# Patient Record
Sex: FEMALE | Race: WHITE | HISPANIC OR LATINO | Employment: UNEMPLOYED | URBAN - METROPOLITAN AREA
[De-identification: names, ages, dates, MRNs, and addresses within clinical notes are randomized per-mention and may not be internally consistent; named-entity substitution may affect disease eponyms.]

---

## 2017-01-06 DIAGNOSIS — E78.5 HYPERLIPIDEMIA: ICD-10-CM

## 2017-01-06 RX ORDER — ATORVASTATIN CALCIUM 20 MG/1
TABLET, FILM COATED ORAL
Qty: 180 TABLET | Refills: 0 | OUTPATIENT
Start: 2017-01-06

## 2017-01-09 DIAGNOSIS — E78.5 HYPERLIPIDEMIA, UNSPECIFIED HYPERLIPIDEMIA TYPE: ICD-10-CM

## 2017-01-09 RX ORDER — ATORVASTATIN CALCIUM 20 MG/1
40 TABLET, FILM COATED ORAL DAILY
Qty: 180 TABLET | Refills: 1 | Status: SHIPPED | OUTPATIENT
Start: 2017-01-09 | End: 2018-05-22 | Stop reason: SDUPTHER

## 2017-05-03 DIAGNOSIS — E05.90 PRIMARY HYPERTHYROIDISM: ICD-10-CM

## 2017-05-03 RX ORDER — METHIMAZOLE 5 MG/1
TABLET ORAL
Qty: 270 TABLET | Refills: 1 | Status: CANCELLED | OUTPATIENT
Start: 2017-05-03

## 2017-05-11 DIAGNOSIS — E05.90 PRIMARY HYPERTHYROIDISM: ICD-10-CM

## 2017-05-11 DIAGNOSIS — E78.5 HYPERLIPIDEMIA, UNSPECIFIED HYPERLIPIDEMIA TYPE: ICD-10-CM

## 2018-01-29 RX ORDER — HYDROCHLOROTHIAZIDE 25 MG/1
TABLET ORAL
Qty: 180 TABLET | Refills: 0 | Status: SHIPPED | OUTPATIENT
Start: 2018-01-29 | End: 2021-05-04

## 2018-05-01 ENCOUNTER — OFFICE VISIT (OUTPATIENT)
Dept: INTERNAL MEDICINE | Facility: CLINIC | Age: 63
End: 2018-05-01
Payer: MEDICAID

## 2018-05-01 ENCOUNTER — HOSPITAL ENCOUNTER (OUTPATIENT)
Dept: RADIOLOGY | Facility: HOSPITAL | Age: 63
Discharge: HOME | End: 2018-05-01
Attending: INTERNAL MEDICINE
Payer: MEDICAID

## 2018-05-01 VITALS
WEIGHT: 197.13 LBS | DIASTOLIC BLOOD PRESSURE: 90 MMHG | SYSTOLIC BLOOD PRESSURE: 130 MMHG | BODY MASS INDEX: 31.81 KG/M2 | TEMPERATURE: 98 F | HEART RATE: 68 BPM

## 2018-05-01 DIAGNOSIS — M54.50 LOW BACK PAIN, NON-SPECIFIC: ICD-10-CM

## 2018-05-01 DIAGNOSIS — Z00.00 ROUTINE GENERAL MEDICAL EXAMINATION AT A HEALTH CARE FACILITY: Primary | ICD-10-CM

## 2018-05-01 DIAGNOSIS — E05.90 HYPERTHYROIDISM: ICD-10-CM

## 2018-05-01 DIAGNOSIS — E55.9 VITAMIN D DEFICIENCY: ICD-10-CM

## 2018-05-01 DIAGNOSIS — E05.00 GRAVES DISEASE: ICD-10-CM

## 2018-05-01 DIAGNOSIS — M51.37 DEGENERATION OF LUMBAR OR LUMBOSACRAL INTERVERTEBRAL DISC: ICD-10-CM

## 2018-05-01 DIAGNOSIS — I87.2 VENOUS INSUFFICIENCY OF BOTH LOWER EXTREMITIES: Primary | ICD-10-CM

## 2018-05-01 DIAGNOSIS — I10 ESSENTIAL HYPERTENSION: ICD-10-CM

## 2018-05-01 DIAGNOSIS — E04.2 MULTIPLE THYROID NODULES: ICD-10-CM

## 2018-05-01 PROCEDURE — 99213 OFFICE O/P EST LOW 20 MIN: CPT | Mod: PBBFAC,25 | Performed by: INTERNAL MEDICINE

## 2018-05-01 PROCEDURE — 72110 X-RAY EXAM L-2 SPINE 4/>VWS: CPT | Mod: TC

## 2018-05-01 PROCEDURE — 99999 PR PBB SHADOW E&M-EST. PATIENT-LVL III: CPT | Mod: PBBFAC,,, | Performed by: INTERNAL MEDICINE

## 2018-05-01 PROCEDURE — 72110 X-RAY EXAM L-2 SPINE 4/>VWS: CPT | Mod: 26,,, | Performed by: RADIOLOGY

## 2018-05-01 PROCEDURE — 99396 PREV VISIT EST AGE 40-64: CPT | Mod: S$PBB,,, | Performed by: INTERNAL MEDICINE

## 2018-05-01 RX ORDER — GABAPENTIN 100 MG/1
100 CAPSULE ORAL NIGHTLY
Qty: 30 CAPSULE | Refills: 2 | Status: SHIPPED | OUTPATIENT
Start: 2018-05-01 | End: 2019-09-04 | Stop reason: SDUPTHER

## 2018-05-02 ENCOUNTER — HOSPITAL ENCOUNTER (OUTPATIENT)
Dept: RADIOLOGY | Facility: HOSPITAL | Age: 63
Discharge: HOME | End: 2018-05-02
Attending: INTERNAL MEDICINE
Payer: MEDICAID

## 2018-05-02 DIAGNOSIS — E05.90 HYPERTHYROIDISM: ICD-10-CM

## 2018-05-02 DIAGNOSIS — E04.2 MULTIPLE THYROID NODULES: ICD-10-CM

## 2018-05-02 PROCEDURE — 76536 US EXAM OF HEAD AND NECK: CPT | Mod: 26,,, | Performed by: RADIOLOGY

## 2018-05-02 PROCEDURE — 76536 US EXAM OF HEAD AND NECK: CPT | Mod: TC

## 2018-05-09 ENCOUNTER — HOSPITAL ENCOUNTER (OUTPATIENT)
Dept: VASCULAR SURGERY | Facility: CLINIC | Age: 63
Discharge: HOME | End: 2018-05-09
Attending: SURGERY
Payer: MEDICAID

## 2018-05-09 ENCOUNTER — INITIAL CONSULT (OUTPATIENT)
Dept: VASCULAR SURGERY | Facility: CLINIC | Age: 63
End: 2018-05-09
Payer: MEDICAID

## 2018-05-09 ENCOUNTER — TELEPHONE (OUTPATIENT)
Dept: INTERNAL MEDICINE | Facility: CLINIC | Age: 63
End: 2018-05-09

## 2018-05-09 VITALS
BODY MASS INDEX: 32.69 KG/M2 | HEART RATE: 65 BPM | SYSTOLIC BLOOD PRESSURE: 128 MMHG | DIASTOLIC BLOOD PRESSURE: 82 MMHG | WEIGHT: 196.19 LBS | TEMPERATURE: 98 F | HEIGHT: 65 IN

## 2018-05-09 DIAGNOSIS — I87.2 VENOUS INSUFFICIENCY OF BOTH LOWER EXTREMITIES: ICD-10-CM

## 2018-05-09 DIAGNOSIS — I87.2 VENOUS INSUFFICIENCY OF BOTH LOWER EXTREMITIES: Primary | ICD-10-CM

## 2018-05-09 DIAGNOSIS — Z13.820 OSTEOPOROSIS SCREENING: Primary | ICD-10-CM

## 2018-05-09 PROCEDURE — 93970 EXTREMITY STUDY: CPT | Mod: PBBFAC | Performed by: SURGERY

## 2018-05-09 PROCEDURE — 99213 OFFICE O/P EST LOW 20 MIN: CPT | Mod: PBBFAC | Performed by: SURGERY

## 2018-05-09 PROCEDURE — 99204 OFFICE O/P NEW MOD 45 MIN: CPT | Mod: S$PBB,,, | Performed by: SURGERY

## 2018-05-09 PROCEDURE — 93970 EXTREMITY STUDY: CPT | Mod: 26,S$PBB,, | Performed by: SURGERY

## 2018-05-09 PROCEDURE — 99999 PR PBB SHADOW E&M-EST. PATIENT-LVL III: CPT | Mod: PBBFAC,,, | Performed by: SURGERY

## 2018-05-09 NOTE — PROGRESS NOTES
"Priscilla Lim  05/09/2018    HPI:  Patient is a 63 y.o. female with a h/o hyperthyroidism on methimazole, HTN, HLD who is here today for evaluation of venous insufficiency.  She complains of R thigh more than leg pain and "sensation of inflammation" worse at the end of the day. The pain started 2-3 years ago and comes and goes, but is worst after a long day of being on her feet. The pain does not exacerbate with ambulation.  No edema; no claudication symptoms    No MI/stroke  Tobacco use: started smoking a few cigarettes/day a year ago but quit 3 months ago.    Works around her house    Past Medical History:   Diagnosis Date    Grave's disease     thyroid nodule    Hypertension     Mixed hyperlipidemia 8/23/2013     No past surgical history on file.  Family History   Problem Relation Age of Onset    Breast cancer Neg Hx     Colon cancer Neg Hx     Ovarian cancer Neg Hx     Cancer Neg Hx     Heart disease Mother     Hypertension Sister     Diabetes Sister     Hypertension Brother     No Known Problems Daughter     No Known Problems Son     No Known Problems Son     Hypertension Sister     Hypertension Sister     Diabetes Sister     Hypertension Sister     Diabetes Sister     Hypertension Sister     Diabetes Sister     Hypertension Brother     Hypertension Brother     Diabetes Brother     Hypertension Brother     Hypertension Brother     Hypertension Brother      Social History     Social History    Marital status:      Spouse name: N/A    Number of children: N/A    Years of education: N/A     Occupational History    Not on file.     Social History Main Topics    Smoking status: Former Smoker     Packs/day: 0.10     Years: 2.00     Quit date: 7/17/2010    Smokeless tobacco: Former User      Comment: ex-social smoker    Alcohol use Yes      Comment: socially    Drug use: No    Sexual activity: Yes     Partners: Male     Birth control/ protection: Post-menopausal     Other " Topics Concern    Not on file     Social History Narrative    No narrative on file       Current Outpatient Prescriptions:     atorvastatin (LIPITOR) 20 MG tablet, Take 2 tablets (40 mg total) by mouth once daily., Disp: 180 tablet, Rfl: 1    gabapentin (NEURONTIN) 100 MG capsule, Take 1 capsule (100 mg total) by mouth every evening., Disp: 30 capsule, Rfl: 2    hydrochlorothiazide (HYDRODIURIL) 25 MG tablet, Take 2 tablets (50 mg total) by mouth once daily., Disp: 180 tablet, Rfl: 1    hydroCHLOROthiazide (HYDRODIURIL) 25 MG tablet, TAKE TWO TABLETS BY MOUTH EVERY DAY, Disp: 180 tablet, Rfl: 0    methimazole (TAPAZOLE) 5 MG Tab, Take 1 tablet (5 mg total) by mouth 3 (three) times daily., Disp: 270 tablet, Rfl: 2    REVIEW OF SYSTEMS:  General: negative; ENT: negative; Allergy and Immunology: negative; Hematological and Lymphatic: Negative; Endocrine: negative; Respiratory: no cough, shortness of breath, or wheezing; Cardiovascular: no chest pain or dyspnea on exertion; Gastrointestinal: no abdominal pain/back, change in bowel habits, or bloody stools; Genito-Urinary: no dysuria, trouble voiding, or hematuria; Musculoskeletal: negative  Neurological: no TIA or stroke symptoms; Psychiatric: no nervousness, anxiety or depression.    PHYSICAL EXAM:   Right Arm BP - Sittin/82 (18 0951)  Left Arm BP - Sittin/86 (18 0951)  Pulse: 65  Temp: 98.2 °F (36.8 °C)      General appearance:  Alert, well-appearing, and in no distress.  Oriented to person, place, and time   Neurological: Normal speech, no focal findings noted; CN II - XII grossly intact           Musculoskeletal: Digits/nail without cyanosis/clubbing.  Normal muscle strength/tone.                 Neck: Supple, no significant adenopathy; thyroid is not enlarged                  No carotid bruit can be auscultated                Chest:  Clear to auscultation, no wheezes, rales or rhonchi, symmetric air entry     No use of accessory  muscles             Cardiac: Normal rate and regular rhythm, S1 and S2 normal; PMI non-displaced          Abdomen: Soft, nontender, nondistended, no masses or organomegaly     No rebound tenderness noted; bowel sounds normal     Pulsatile aortic mass is non palpable.     No groin adenopathy      Extremities:   Difficult to palpate femoral pulses bilaterally     2+pedal pulses palpable.     Minimal pre-tibial edema     No ulcerations    LAB RESULTS:  Lab Results   Component Value Date    K 4.0 05/01/2018    K 3.2 (L) 07/07/2015    K 3.5 08/19/2013    CREATININE 0.6 05/01/2018    CREATININE 0.7 07/07/2015    CREATININE 0.7 08/19/2013     Lab Results   Component Value Date    WBC 4.52 05/01/2018    WBC 4.82 07/07/2015    WBC 4.82 08/19/2013    HCT 40.5 05/01/2018    HCT 39.6 07/07/2015    HCT 41.1 08/19/2013     05/01/2018     07/07/2015     08/19/2013     Lab Results   Component Value Date    HGBA1C 5.6 05/01/2018    HGBA1C 5.9 07/07/2015    HGBA1C 6.0 08/19/2013     IMAGING:  EVLT u/s:  R GSV 5.5 mm  L GSV no reflux, No DVT    IMP/PLAN:  63 y.o. female with h/o hyperthyroidism on methimazole, HTN, HLD with R thigh discomfort -- burning and bilateral legs 'inflamed' / pain per pt  No lower leg edema; has bilateral lower leg pain  Does have R GSV  Reflux; may only explain part of R thigh discomfort  Do not think most of her symptoms are coming from the R reflux  Can do trial of Rx thigh-high 30-40 mm Hg compression to see whether R thigh discomfort improves and fu in 3-6 months   Also - think by her exam (has reticular veins) she may have L-sided GSV reflux; check [place] vas lab    Chintan Anderson MD FACS  Vascular/Endovascular Surgery

## 2018-05-09 NOTE — PROGRESS NOTES
Subjective:       Patient ID: Priscilla Lim is a 63 y.o. female.    Chief Complaint: Annual Exam    HPIPleasant lady from [place] here for her annual exam. Overall doing well,  But has noted  A burning sensation in R thigh especially when supine. She has pain in her L buttock most of the time - tolerable, but there. She takes ibuprofen occasionally with relief. I will obtain L spine XR for evaluation as well as referral to Vascular Medicine as she has evidence of varicose veins.  She has a history of Grave's Disease and is on Tapazole. She has been on this medication for several years and has not caused her any issues. Last visits blood work showed positive TSI IgG indicating activity in that regard. I will repeat this level for comparison. She also has hx of MNG and will obtain thyroid US for comparison.  Review of Systems   Constitutional: Negative for activity change, appetite change, fatigue and unexpected weight change.   Eyes: Negative for visual disturbance.   Respiratory: Negative for cough.    Cardiovascular: Negative for chest pain and leg swelling.   Gastrointestinal: Negative for abdominal distention, abdominal pain and blood in stool.   Genitourinary: Negative for difficulty urinating.   Musculoskeletal: Positive for arthralgias and joint swelling.   Skin: Negative.    Neurological: Negative for dizziness, weakness, light-headedness, numbness and headaches.       Objective:      Physical Exam   Constitutional: She is oriented to person, place, and time. She appears well-developed and well-nourished. No distress.   HENT:   Head: Normocephalic and atraumatic.   Right Ear: External ear normal.   Left Ear: External ear normal.   Mouth/Throat: Oropharynx is clear and moist. No oropharyngeal exudate.   Eyes: Conjunctivae and EOM are normal. Pupils are equal, round, and reactive to light. Right eye exhibits no discharge. Left eye exhibits no discharge. No scleral icterus.   Mild exophthalmos OS   Neck:  Normal range of motion. Neck supple. No JVD present. No thyromegaly present.   Cardiovascular: Normal rate, regular rhythm, normal heart sounds and intact distal pulses.    No murmur heard.  Varicose veins bilaterally.   Pulmonary/Chest: Effort normal and breath sounds normal. No respiratory distress. She has no wheezes. She exhibits no tenderness.   Abdominal: Soft. Bowel sounds are normal. She exhibits no distension and no mass. There is no tenderness.   Musculoskeletal: Normal range of motion. She exhibits tenderness. She exhibits no edema or deformity.   Lymphadenopathy:     She has no cervical adenopathy.   Neurological: She is alert and oriented to person, place, and time. No cranial nerve deficit. Coordination normal.   Skin: Skin is warm and dry. No rash noted. No erythema.   Dark pigmented nail L Great Toe.   Psychiatric: She has a normal mood and affect. Her behavior is normal. Judgment and thought content normal.   Nursing note and vitals reviewed.      Assessment:       1. Routine general medical examination at a health care facility    2. Essential hypertension    3. Hyperthyroidism    4. Multiple thyroid nodules    5. Vitamin D deficiency    6. Low back pain, non-specific    7. Degeneration of lumbar or lumbosacral intervertebral disc      8.    Grave's Disease.    9.    Varicose veins LE.   10.   Pigmented toe nail.  Plan:    1. Obtain blood work.         2. Obtain thyroid US.         3. Obtain L spine XR.         4. Refer to Vascular Medicine.         5. Refer to Dermatology.         6. Continue with current medications.         7. RTC for review.

## 2018-05-09 NOTE — LETTER
May 9, 2018      Yandel Batista MD  [address]  [facility] [address]           [facility] - [facility]  [address]  [facility] [address]  Phone: [phone]  Fax: [phone]          Patient: Priscilla Lim   MR Number: [id number]   YOB: 1955   Date of Visit: 5/9/2018       Dear Dr. Yandel Batista:    Thank you for referring Priscilla Lim to me for evaluation. Attached you will find relevant portions of my assessment and plan of care.    If you have questions, please do not hesitate to call me. I look forward to following Priscilla Lim along with you.    Sincerely,    Chintan Anderson MD    Enclosure  CC:  No Recipients    If you would like to receive this communication electronically, please contact [email or web address] or [phone] to request more information on [employer] Envelope Link access.    For providers and/or their staff who would like to refer a patient to [facility], please contact us through our one-stop-shop provider referral line, [facility], at [phone].    If you feel you have received this communication in error or would no longer like to receive these types of communications, please e-mail [email or web address]

## 2018-05-15 ENCOUNTER — HOSPITAL ENCOUNTER (OUTPATIENT)
Dept: RADIOLOGY | Facility: CLINIC | Age: 63
Discharge: HOME | End: 2018-05-15
Attending: INTERNAL MEDICINE
Payer: MEDICAID

## 2018-05-15 DIAGNOSIS — Z13.820 OSTEOPOROSIS SCREENING: ICD-10-CM

## 2018-05-15 PROCEDURE — 77080 DXA BONE DENSITY AXIAL: CPT | Mod: TC

## 2018-05-15 PROCEDURE — 77080 DXA BONE DENSITY AXIAL: CPT | Mod: 26,,, | Performed by: INTERNAL MEDICINE

## 2018-05-22 ENCOUNTER — OFFICE VISIT (OUTPATIENT)
Dept: INTERNAL MEDICINE | Facility: CLINIC | Age: 63
End: 2018-05-22
Payer: MEDICAID

## 2018-05-22 DIAGNOSIS — M51.37 DEGENERATION OF LUMBAR OR LUMBOSACRAL INTERVERTEBRAL DISC: ICD-10-CM

## 2018-05-22 DIAGNOSIS — E04.1 LEFT THYROID NODULE: ICD-10-CM

## 2018-05-22 DIAGNOSIS — E78.5 HYPERLIPIDEMIA, UNSPECIFIED HYPERLIPIDEMIA TYPE: ICD-10-CM

## 2018-05-22 DIAGNOSIS — E05.90 PRIMARY HYPERTHYROIDISM: ICD-10-CM

## 2018-05-22 DIAGNOSIS — Z71.89 ENCOUNTER FOR MEDICATION REVIEW AND COUNSELING: Primary | ICD-10-CM

## 2018-05-22 PROCEDURE — 99212 OFFICE O/P EST SF 10 MIN: CPT | Mod: PBBFAC | Performed by: INTERNAL MEDICINE

## 2018-05-22 PROCEDURE — 99999 PR PBB SHADOW E&M-EST. PATIENT-LVL II: CPT | Mod: PBBFAC,,, | Performed by: INTERNAL MEDICINE

## 2018-05-22 PROCEDURE — 99213 OFFICE O/P EST LOW 20 MIN: CPT | Mod: S$PBB,,, | Performed by: INTERNAL MEDICINE

## 2018-05-22 RX ORDER — TRAMADOL HYDROCHLORIDE 50 MG/1
50 TABLET ORAL EVERY 12 HOURS PRN
Qty: 60 TABLET | Refills: 1 | Status: SHIPPED | OUTPATIENT
Start: 2018-05-22 | End: 2021-05-04

## 2018-05-22 RX ORDER — ATORVASTATIN CALCIUM 20 MG/1
TABLET, FILM COATED ORAL
Qty: 90 TABLET | Refills: 2 | Status: SHIPPED | OUTPATIENT
Start: 2018-05-22 | End: 2020-10-08 | Stop reason: CLARIF

## 2018-05-22 RX ORDER — METHIMAZOLE 5 MG/1
5 TABLET ORAL DAILY
Qty: 90 TABLET | Refills: 3 | Status: SHIPPED | OUTPATIENT
Start: 2018-05-22 | End: 2018-08-21

## 2018-05-22 NOTE — PROGRESS NOTES
Mrs Lim is here today for her review. I gave her copies of her studies and discussed them in detail including blood work that showed elevated lipids. She has stopped taking her Lipitor and I recommended she restart it. I gave her rx for Lipitor 20 mgs daily. I will repeat lipid profile/ALT in 2-3 months. TSI had decreased to 0.34 (down from 5.7 several years ago). She will continue with Tapazole 5 mgs daily as before; rx provided. All other parameters were unremarkable. Additional studies included BMD that showed osteopenia of L spine; will continue with calcium/vitamin D daily. Repeat BMD in 2 years. I provided the thyroid US report which showed a mixed cystic/solid nodule L lobe measuring 1.7x10.8x0.8 cm - TIRADS 5. I will obtain FNA of nodule as recommended and proceed from there. L spine XR showed mild DJD as well as Grade I anterolisthesis of L4 on L5. She c/o pain in her lower back and buttock that sometimes can be severe. She has been seen in Pain Management in the past and underwent WARREN with minimal relief. She takes Ibuprofen 800 mgs tabs prn. I gave her rx for Tramadol 50 m gs with instructions for severe pain. Risks/benfits were discussed. Core strengthening exercises were also discussed. She will continue with her other medications and RTC 3 months or sooner if needed.

## 2018-06-20 ENCOUNTER — TELEPHONE (OUTPATIENT)
Dept: INTERNAL MEDICINE | Facility: CLINIC | Age: 63
End: 2018-06-20

## 2018-06-20 DIAGNOSIS — E04.2 MULTIPLE THYROID NODULES: Primary | ICD-10-CM

## 2018-06-21 ENCOUNTER — HOSPITAL ENCOUNTER (OUTPATIENT)
Dept: RADIOLOGY | Facility: HOSPITAL | Age: 63
Discharge: HOME | End: 2018-06-21
Attending: INTERNAL MEDICINE
Payer: MEDICAID

## 2018-06-21 DIAGNOSIS — E04.2 MULTIPLE THYROID NODULES: ICD-10-CM

## 2018-06-21 PROCEDURE — 88173 CYTOPATH EVAL FNA REPORT: CPT | Performed by: PATHOLOGY

## 2018-06-21 PROCEDURE — 10022 US FINE NEEDLE ASPIRATION WITH IMAGING: CPT | Mod: ,,, | Performed by: RADIOLOGY

## 2018-06-21 PROCEDURE — 88173 CYTOPATH EVAL FNA REPORT: CPT | Mod: 26,,, | Performed by: PATHOLOGY

## 2018-06-21 PROCEDURE — 76942 ECHO GUIDE FOR BIOPSY: CPT | Mod: 26,,, | Performed by: RADIOLOGY

## 2018-06-21 PROCEDURE — 10022 US FINE NEEDLE ASPIRATION WITH IMAGING: CPT

## 2018-06-21 NOTE — H&P
Radiology History & Physical      SUBJECTIVE:     Chief Complaint: left thyroid nodule    History of Present Illness:  Priscilla Lim is a 63 y.o. female who presents for left thyroid nodule fna  Past Medical History:   Diagnosis Date    Grave's disease     thyroid nodule    Hypertension     Mixed hyperlipidemia 8/23/2013     No past surgical history on file.    Home Meds:   Prior to Admission medications    Medication Sig Start Date End Date Taking? Authorizing Provider   atorvastatin (LIPITOR) 20 MG tablet Ata 1 tableta por jorge. 5/22/18   Yandel Batista MD   gabapentin (NEURONTIN) 100 MG capsule Take 1 capsule (100 mg total) by mouth every evening. 5/1/18 5/1/19  Yandel Batista MD   hydrochlorothiazide (HYDRODIURIL) 25 MG tablet Take 2 tablets (50 mg total) by mouth once daily. 12/28/16   Yandel Batista MD   hydroCHLOROthiazide (HYDRODIURIL) 25 MG tablet TAKE TWO TABLETS BY MOUTH EVERY DAY 1/29/18   Yandel Batista MD   methimazole (TAPAZOLE) 5 MG Tab Take 1 tablet (5 mg total) by mouth 3 (three) times daily. 7/17/15   Fina Duran MD   methIMAzole (TAPAZOLE) 5 MG Tab Take 1 tablet (5 mg total) by mouth once daily. 5/22/18 5/22/19  Yandel Batista MD   traMADol (ULTRAM) 50 mg tablet Take 1 tablet (50 mg total) by mouth every 12 (twelve) hours as needed for Pain. 5/22/18   Yandel Batista MD     Anticoagulants/Antiplatelets: no anticoagulation    Allergies: Review of patient's allergies indicates:  No Known Allergies  Sedation History:  no adverse reactions    Review of Systems:   As documented in primary provider H&P.      OBJECTIVE:     Vital Signs (Most Recent)       Physical Exam:  ASA: 3  Mallampati: 1      Laboratory  Lab Results   Component Value Date    INR 1.3 (H) 09/22/2010       Lab Results   Component Value Date    WBC 4.52 05/01/2018    HGB 13.0 05/01/2018    HCT 40.5 05/01/2018    MCV 86 05/01/2018     05/01/2018      Lab Results   Component  Value Date    GLU 85 05/01/2018     05/01/2018    K 4.0 05/01/2018     05/01/2018    CO2 27 05/01/2018    BUN 8 05/01/2018    CREATININE 0.6 05/01/2018    CALCIUM 9.3 05/01/2018    MG 1.9 09/22/2010    ALT 17 05/01/2018    AST 16 05/01/2018    ALBUMIN 3.7 05/01/2018    BILITOT 0.3 05/01/2018       ASSESSMENT/PLAN:     Sedation Plan: local only  Patient will undergo left thyroid nodule fna.

## 2018-06-21 NOTE — PROCEDURES
Radiology Post-Procedure Note    Pre Op Diagnosis: Thyroid nodule    Post Op Diagnosis: same    Procedure: Ultrasound guided thyroid biopsy    Procedure performed by: Nikki Ferrera MD    Written Informed Consent Obtained: Yes    Specimen Removed: YES     Estimated Blood Loss: Minimal    Findings: Local anesthesia was used.    Fine needle aspiration was performed for evaluation of a left sided thyroid nodule using ultrasound guidance. Specimen was sent to the laboratory for further analysis.    There were no complications and the patient tolerated the procedure well.  Please see Imaging report for further details.    Maury Urbina MD  Department of Radiology   PGY II  [phone]

## 2018-08-17 NOTE — PROGRESS NOTES
Subjective:      Patient ID: Priscilla Viera is a 63 y.o. female.    Chief Complaint:  Thyroid Problem      History of Present Illness  Priscilla Viera presents today for Evaluation & management of hyperthyroidism. This is her first visit with me. She was previously seen by Dr. Fitzgerald in 07/2015.    Hyperthyroidism from Graves' disease diagnosed in 2010 that is currently taking Methimazole 5 mg daily.  Occ she feels hot.   She is irritable, impatient and occ feels anxiety.   Has left eye exophthalmos and no pain. (saw optho in 2015)   + occassional headaches.   Denies blurry vision or double vision.   + weight gain- 2 lbs.   No constipation or diarrhea.   + Hair loss.   + Dry skin.   No tiredness.   No cp, palpations or sob     She had a thyroid nuc med scan in 2010:  IMPRESSION:   1.  THE FINDINGS ARE COMPATIBLE WITH GRAVES DISEASE.  2.  IF IODINE-131 TREATMENT IS CONSIDERED, THE DOSE WOULD BE 16 mCi   ORALLY OF IODINE-131.  3.  THE 24 HOURS IODINE UPTAKE IS INCREASED TO 70%.    She has exophthalmos of the left eye and Dr. Fitzgerald and her decided not to proceed with i131.   Results for PRISCLILA VIERA (MRN [id number]) as of 8/21/2018 09:24   Ref. Range 7/7/2015 07:40 5/1/2018 11:49   TSH Latest Ref Range: 0.400 - 4.000 uIU/mL 1.587 0.806   Free T4 Latest Ref Range: 0.71 - 1.51 ng/dL 1.00    Thyroid-Stim IG Quantitative Latest Ref Range: <0.10 IU/L  0.34 (H)     Also has a thyroid nodule in the left lobe that is being monitored.   Last thyroid u/s 5/2/2018:  FINDINGS:  Right lobe of the thyroid measures 5.1 x 1.6 x 1.6 cm.    Left lobe of the thyroid measures 5.3 x 1.8 x 1.9 cm.    Isthmus measures 0.3 cm in thickness.    Several tiny benign-appearing anechoic lesions identified within the right thyroid gland.  The left thyroid gland is heterogeneous.  There is a suspicious appearing left thyroid nodule measuring 1.7 x 10.8 x 0.8 cm which is mixed cystic and solid in appearance, hypoechoic, and irregular consistent  with a TIRADS 5 nodule and meets criteria for FNA.    No abnormal cervical lymph nodes are identified.      Impression       1.  TIRADS 5 left thyroid lobe nodule which meets criteria for FNA.     Had a FNA in radiology 6/21/2018:  FINAL PATHOLOGIC DIAGNOSIS  THYROID, LEFT, FINE-NEEDLE ASPIRATION:  Satisfactory for interpretation  [place] system thyroid cytology category: Benign  Benign follicular epithelial cells and colloid consistent with benign colloid nodule    No difficulty breathing or swallowing   No voice changes  No FH of thyroid cancer  No personal history of radiation treatment or exposure     She takes HCTZ 25 mg daily for HTN.     BMD done 5/2018:  Impression     Osteopenia of the lumbar spine.  FRAX calculations do not support treatment.    RECOMMENDATIONS of [facility]:    1.  Calcium 6330-2722 mg daily and vitamin D 800-1000 units daily, adequate exercise.    2.  Repeat BMD in 2 years     Review of Systems   Constitutional: Positive for fatigue. Negative for unexpected weight change.   Eyes: Negative for visual disturbance.   Respiratory: Negative for cough and shortness of breath.    Cardiovascular: Negative for chest pain.   Gastrointestinal: Negative for abdominal pain.   Endocrine: Negative for cold intolerance, heat intolerance, polydipsia, polyphagia and polyuria.   Musculoskeletal: Negative for arthralgias.   Skin: Negative for wound.   Neurological: Negative for headaches.   Hematological: Does not bruise/bleed easily.   Psychiatric/Behavioral: Negative for sleep disturbance.     Objective:   Physical Exam   Constitutional: She appears well-developed.   HENT:   Right Ear: External ear normal.   Left Ear: External ear normal.   Nose: Nose normal.   Hearing Normal     Eyes:   exophthalmos of the left eye   Neck: No tracheal deviation present. Thyromegaly present.   Cardiovascular: Normal rate.   No murmur heard.  No edema present   Pulmonary/Chest:  Effort normal and breath sounds normal.   Abdominal: Soft. She exhibits no mass. No hernia.   Neurological: She is alert. No cranial nerve deficit or sensory deficit.   Skin: No rash noted.   Psychiatric: She has a normal mood and affect. Judgment normal.   Vitals reviewed.    Body mass index is 36.13 kg/m².    Lab Review:   Lab Results   Component Value Date    HGBA1C 5.6 05/01/2018     Lab Results   Component Value Date    CHOL 236 (H) 05/01/2018    HDL 43 05/01/2018    LDLCALC 152.4 05/01/2018    TRIG 203 (H) 05/01/2018    CHOLHDL 18.2 (L) 05/01/2018     Lab Results   Component Value Date     05/01/2018    K 4.0 05/01/2018     05/01/2018    CO2 27 05/01/2018    GLU 85 05/01/2018    BUN 8 05/01/2018    CREATININE 0.6 05/01/2018    CALCIUM 9.3 05/01/2018    PROT 6.9 05/01/2018    ALBUMIN 3.7 05/01/2018    BILITOT 0.3 05/01/2018    ALKPHOS 74 05/01/2018    AST 16 05/01/2018    ALT 17 05/01/2018    ANIONGAP 9 05/01/2018    ESTGFRAFRICA >60.0 05/01/2018    EGFRNONAA >60.0 05/01/2018    TSH 0.806 05/01/2018       Assessment and Plan     1. Primary hyperthyroidism  TSH    methIMAzole (TAPAZOLE) 5 MG Tab    Ambulatory referral to Ophthalmology   2. Multiple thyroid nodules  US Soft Tissue Head Neck Thyroid   3. Exophthalmos  Ambulatory referral to Ophthalmology     -- not a candidate for i131 treatment due to eye manifestations.   -- schedule appointment with optho since last one was in 2015.   -- increase methimazole 7.5 mg daily (1.5 tablets) to assist with symptoms. Encouraged her to let me know if she develops a rash, fever, or sore throat while taking this medication.    -- recheck TFTs in 6 weeks  -- discussed thyroidectomy with patient and she is not interested at this time.    -- repeat thyroid US in 1 year. Order placed.     Follow-up in about 1 year (around 8/21/2019).

## 2018-08-21 ENCOUNTER — OFFICE VISIT (OUTPATIENT)
Dept: ENDOCRINOLOGY | Facility: CLINIC | Age: 63
End: 2018-08-21
Payer: MEDICAID

## 2018-08-21 VITALS
SYSTOLIC BLOOD PRESSURE: 118 MMHG | HEART RATE: 84 BPM | BODY MASS INDEX: 36.35 KG/M2 | HEIGHT: 62 IN | WEIGHT: 197.56 LBS | DIASTOLIC BLOOD PRESSURE: 82 MMHG

## 2018-08-21 DIAGNOSIS — H05.20 EXOPHTHALMOS: ICD-10-CM

## 2018-08-21 DIAGNOSIS — E04.2 MULTIPLE THYROID NODULES: ICD-10-CM

## 2018-08-21 DIAGNOSIS — E05.90 PRIMARY HYPERTHYROIDISM: Primary | ICD-10-CM

## 2018-08-21 PROCEDURE — 99999 PR PBB SHADOW E&M-EST. PATIENT-LVL IV: CPT | Mod: PBBFAC,,, | Performed by: NURSE PRACTITIONER

## 2018-08-21 PROCEDURE — 99214 OFFICE O/P EST MOD 30 MIN: CPT | Mod: PBBFAC | Performed by: NURSE PRACTITIONER

## 2018-08-21 PROCEDURE — 99204 OFFICE O/P NEW MOD 45 MIN: CPT | Mod: S$PBB,,, | Performed by: NURSE PRACTITIONER

## 2018-08-21 RX ORDER — METHIMAZOLE 5 MG/1
TABLET ORAL
Qty: 120 TABLET | Refills: 3 | Status: SHIPPED | OUTPATIENT
Start: 2018-08-21 | End: 2021-05-11

## 2018-08-21 NOTE — LETTER
August 21, 2018      Yandel Batista MD  [address]  [facility] [address]           [facility] - Endocrinology  [address]  [facility] [address]  Phone: [phone]          Patient: Priscilla Lim   MR Number: [id number]   YOB: 1955   Date of Visit: 8/21/2018       Dear Dr. Yandel Batista:    Thank you for referring Priscilla Lim to me for evaluation. Attached you will find relevant portions of my assessment and plan of care.    If you have questions, please do not hesitate to call me. I look forward to following Priscilla Lim along with you.    Sincerely,    Kesha Sung, ESTHER    Enclosure  CC:  No Recipients    If you would like to receive this communication electronically, please contact [email or web address] or [phone] to request more information on [employer] Link access.    For providers and/or their staff who would like to refer a patient to [facility], please contact us through our one-stop-shop provider referral line, [facility], at [phone].    If you feel you have received this communication in error or would no longer like to receive these types of communications, please e-mail [email or web address]

## 2018-09-13 ENCOUNTER — CLINICAL SUPPORT (OUTPATIENT)
Dept: OPHTHALMOLOGY | Facility: CLINIC | Age: 63
End: 2018-09-13
Payer: MEDICAID

## 2018-09-13 ENCOUNTER — INITIAL CONSULT (OUTPATIENT)
Dept: OPHTHALMOLOGY | Facility: CLINIC | Age: 63
End: 2018-09-13
Payer: MEDICAID

## 2018-09-13 DIAGNOSIS — E05.00 GRAVES' ORBITOPATHY: ICD-10-CM

## 2018-09-13 PROCEDURE — 92083 EXTENDED VISUAL FIELD XM: CPT | Mod: PBBFAC | Performed by: OPHTHALMOLOGY

## 2018-09-13 PROCEDURE — 99212 OFFICE O/P EST SF 10 MIN: CPT | Mod: PBBFAC | Performed by: OPHTHALMOLOGY

## 2018-09-13 PROCEDURE — 99999 PR PBB SHADOW E&M-EST. PATIENT-LVL II: CPT | Mod: PBBFAC,,, | Performed by: OPHTHALMOLOGY

## 2018-09-13 PROCEDURE — 92004 COMPRE OPH EXAM NEW PT 1/>: CPT | Mod: S$PBB,,, | Performed by: OPHTHALMOLOGY

## 2018-09-13 NOTE — PATIENT INSTRUCTIONS
Artificial tears to both eyes four times a day (Refresh or Systane).  Refresh PM ointment to both eyes at bedtime.  Repeat examination in six months.

## 2018-09-13 NOTE — LETTER
September 13, 2018      Yandel Batista MD  [address]  [facility] [address]           [facility] - Ophthalmology  [address]  [facility] [address]  Phone: [phone]  Fax: [phone]          Patient: Priscilla Lim   MR Number: [id number]   YOB: 1955   Date of Visit: 9/13/2018       Dear Kesha Sung:    Thank you for referring Priscilla Lim to me for evaluation. Attached you will find relevant portions of my assessment and plan of care.    If you have questions, please do not hesitate to call me. I look forward to following Priscilla Lim along with you.    Sincerely,    Carlos Horowitz MD    Enclosure  CC:  Kesha Sung, ESTHER    If you would like to receive this communication electronically, please contact [email or web address] or [phone] to request more information on [employer] Link access.    For providers and/or their staff who would like to refer a patient to [facility], please contact us through our one-stop-shop provider referral line, [facility], at [phone].    If you feel you have received this communication in error or would no longer like to receive these types of communications, please e-mail [email or web address]

## 2018-09-13 NOTE — PROGRESS NOTES
HPI     Concerns About Ocular Health      Additional comments: Graves'              Comments     Referred by Dr.Carly JAYME Sung,(Endo)  Dx w/Graves' disease since 2010.  [facility]  here w/patient.  Patient states OU vision has been stable, only using OTC readers.  occasional OS eye pain when stressed.  No eye pain today.    Review HVF    I have personally interviewed the patient, reviewed the history and   examined the patient and agree with the technician's exam.          Last edited by Carlos Horowitz MD on 9/13/2018  3:23 PM. (History)            Assessment /Plan     For exam results, see Encounter Report.    Graves' orbitopathy  -     Treadwell Visual Field - OU - Extended - Both Eyes      Artificial tears four times per day.  Lubricating ointment at bedtime.   Cold compresses as desired.  Repeat exam in six months.

## 2018-10-02 ENCOUNTER — LAB VISIT (OUTPATIENT)
Dept: LAB | Facility: HOSPITAL | Age: 63
End: 2018-10-02
Payer: MEDICAID

## 2018-10-02 ENCOUNTER — TELEPHONE (OUTPATIENT)
Dept: ENDOCRINOLOGY | Facility: CLINIC | Age: 63
End: 2018-10-02

## 2018-10-02 ENCOUNTER — TELEPHONE (OUTPATIENT)
Dept: INTERNAL MEDICINE | Facility: CLINIC | Age: 63
End: 2018-10-02

## 2018-10-02 DIAGNOSIS — E05.90 PRIMARY HYPERTHYROIDISM: ICD-10-CM

## 2018-10-02 DIAGNOSIS — Z12.31 ENCOUNTER FOR SCREENING MAMMOGRAM FOR BREAST CANCER: Primary | ICD-10-CM

## 2018-10-02 LAB — TSH SERPL DL<=0.005 MIU/L-ACNC: 1.07 UIU/ML

## 2018-10-02 PROCEDURE — 84443 ASSAY THYROID STIM HORMONE: CPT

## 2018-10-02 PROCEDURE — 36415 COLL VENOUS BLD VENIPUNCTURE: CPT

## 2018-10-02 NOTE — TELEPHONE ENCOUNTER
Attempted to call pt on  both contact no and phone is busy cant left voice mail is full cant left voice mail. Will try back.

## 2018-10-09 ENCOUNTER — OFFICE VISIT (OUTPATIENT)
Dept: OBSTETRICS AND GYNECOLOGY | Facility: CLINIC | Age: 63
End: 2018-10-09
Payer: MEDICAID

## 2018-10-09 ENCOUNTER — TELEPHONE (OUTPATIENT)
Dept: OBSTETRICS AND GYNECOLOGY | Facility: CLINIC | Age: 63
End: 2018-10-09

## 2018-10-09 VITALS
DIASTOLIC BLOOD PRESSURE: 70 MMHG | WEIGHT: 195 LBS | HEIGHT: 65 IN | BODY MASS INDEX: 32.49 KG/M2 | SYSTOLIC BLOOD PRESSURE: 120 MMHG

## 2018-10-09 DIAGNOSIS — Z12.39 BREAST CANCER SCREENING: ICD-10-CM

## 2018-10-09 DIAGNOSIS — Z78.0 POSTMENOPAUSE: ICD-10-CM

## 2018-10-09 DIAGNOSIS — N81.89 PELVIC RELAXATION: ICD-10-CM

## 2018-10-09 DIAGNOSIS — N39.41 URGE INCONTINENCE OF URINE: ICD-10-CM

## 2018-10-09 DIAGNOSIS — Z01.419 WELL WOMAN EXAM WITH ROUTINE GYNECOLOGICAL EXAM: Primary | ICD-10-CM

## 2018-10-09 PROCEDURE — G0101 CA SCREEN;PELVIC/BREAST EXAM: HCPCS | Mod: PBBFAC | Performed by: NURSE PRACTITIONER

## 2018-10-09 PROCEDURE — 99214 OFFICE O/P EST MOD 30 MIN: CPT | Mod: PBBFAC | Performed by: NURSE PRACTITIONER

## 2018-10-09 PROCEDURE — 99999 PR PBB SHADOW E&M-EST. PATIENT-LVL IV: CPT | Mod: PBBFAC,,, | Performed by: NURSE PRACTITIONER

## 2018-10-09 PROCEDURE — 88175 CYTOPATH C/V AUTO FLUID REDO: CPT

## 2018-10-09 PROCEDURE — 99386 PREV VISIT NEW AGE 40-64: CPT | Mod: S$PBB,,, | Performed by: NURSE PRACTITIONER

## 2018-10-09 RX ORDER — CLOTRIMAZOLE 1 %
CREAM (GRAM) TOPICAL
COMMUNITY
Start: 2018-10-03

## 2018-10-09 RX ORDER — TERBINAFINE HYDROCHLORIDE 250 MG/1
TABLET ORAL
COMMUNITY
Start: 2018-10-03 | End: 2019-09-04

## 2018-10-09 NOTE — PROGRESS NOTES
HISTORY OF PRESENT ILLNESS:    Priscilla Lim is a 63 y.o. female , presents for a routine exam and has no gyn complaints.    -c/o U.I, frequency, pad use, vulvar chafing from pad use w/o associated fever, dysuria, pelvic or back pain, vaginal discharge or bleeding.    Past Medical History:   Diagnosis Date    Grave's disease     thyroid nodule    Hypertension     Mixed hyperlipidemia 2013       Past Surgical History:   Procedure Laterality Date    BLOCK-NERVE-MEDIAL BRANCH-LUMBAR Right 2015    Performed by Tierra Tolliver MD at [facility] [place], [place]    BLOCK-NERVE-MEDIAL BRANCH-LUMBAR Left 2015    Performed by Ammon Sweet MD at [facility]        MEDICATIONS AND ALLERGIES:      Current Outpatient Medications:     atorvastatin (LIPITOR) 20 MG tablet, Ata 1 tableta por jorge., Disp: 90 tablet, Rfl: 2    clotrimazole (LOTRIMIN) 1 % cream, , Disp: , Rfl:     gabapentin (NEURONTIN) 100 MG capsule, Take 1 capsule (100 mg total) by mouth every evening., Disp: 30 capsule, Rfl: 2    hydroCHLOROthiazide (HYDRODIURIL) 25 MG tablet, TAKE TWO TABLETS BY MOUTH EVERY DAY, Disp: 180 tablet, Rfl: 0    methIMAzole (TAPAZOLE) 5 MG Tab, Take 1.5 tablets daily (7.5 mg), Disp: 120 tablet, Rfl: 3    MULTIVIT-IRON-MIN-FOLIC ACID 3,500-18-0.4 UNIT-MG-MG ORAL CHEW, Take by mouth., Disp: , Rfl:     terbinafine HCl (LAMISIL) 250 mg tablet, , Disp: , Rfl:     traMADol (ULTRAM) 50 mg tablet, Take 1 tablet (50 mg total) by mouth every 12 (twelve) hours as needed for Pain., Disp: 60 tablet, Rfl: 1    Review of patient's allergies indicates:  No Known Allergies    Family History   Problem Relation Age of Onset    Heart disease Mother     Hypertension Sister     Diabetes Sister     Hypertension Brother     No Known Problems Daughter     No Known Problems Son     No Known Problems Son     Hypertension Sister     Hypertension Sister     Diabetes Sister     Hypertension Sister     Diabetes Sister      Hypertension Sister     Diabetes Sister     Hypertension Brother     Hypertension Brother     Diabetes Brother     Hypertension Brother     Hypertension Brother     Hypertension Brother     Breast cancer Neg Hx     Colon cancer Neg Hx     Ovarian cancer Neg Hx        Social History     Socioeconomic History    Marital status:      Spouse name: Not on file    Number of children: Not on file    Years of education: Not on file    Highest education level: Not on file   Social Needs    Financial resource strain: Not on file    Food insecurity - worry: Not on file    Food insecurity - inability: Not on file    Transportation needs - medical: Not on file    Transportation needs - non-medical: Not on file   Occupational History    Not on file   Tobacco Use    Smoking status: Former Smoker     Packs/day: 0.10     Years: 2.00     Pack years: 0.20     Last attempt to quit: 2010     Years since quittin.2    Smokeless tobacco: Former User    Tobacco comment: ex-social smoker   Substance and Sexual Activity    Alcohol use: Yes     Comment: socially    Drug use: No    Sexual activity: Yes     Partners: Male     Birth control/protection: Post-menopausal   Other Topics Concern    Not on file   Social History Narrative    Not on file       OB HISTORY: Number of vaginal deliveries:3    COMPREHENSIVE GYN HISTORY:  PAP History:  Denies abnormal Paps. LAST PAP 13 NORMAL.  Infection History: Denies STDs. Denies PID.  Benign History: Denies uterine fibroids. Denies ovarian cysts. Denies endometriosis.  Denies other conditions.  Cancer History: Denies cervical cancer. Denies uterine cancer or hyperplasia. Denies ovarian cancer. Denies vulvar cancer or pre-cancer. Denies vaginal cancer or pre-cancer. Denies breast cancer. Denies colon cancer.  Sexual Activity History: Reports currently being sexually active  Menstrual History: Denies menses. Pt is  not on HRT.     ROS:  GENERAL: +  "INTENTIONAL WT LOSS. No fatigue. No swelling. No fever.  CARDIOVASCULAR: No chest pain. No shortness of breath. No leg cramps.   NEUROLOGICAL: No headaches. No vision changes.  MSK: + CHRONIC BACK PAIN.  BREASTS: No pain. No lumps. No discharge.  ABDOMEN: No pain. No nausea. No vomiting. No diarrhea. No constipation.  REPRODUCTIVE: No abnormal bleeding.   VULVA: No pain. No lesions. No itching.  VAGINA: No relaxation. No itching. No odor. No discharge. No lesions.  URINARY: + UI, PAD USE, NOCTURIA, FREQUENCY. No dysuria.    /70   Ht 5' 5" (1.651 m)   Wt 88.5 kg (195 lb)   LMP  (LMP Unknown)   BMI 32.45 kg/m²   7-16-15  lb 6.4 oz    PE:  APPEARANCE: Well nourished, well developed, in no acute distress.  AFFECT: WNL, alert and oriented x 3.  SKIN: No hirsutism or acne.  NECK: Neck symmetric without masses or thyromegaly.  NODES: No inguinal, cervical, axillary or femoral lymph node enlargement.  CHEST: Good respiratory effort.   ABDOMEN: Soft. No tenderness or masses. OBESE. No CVA tenderness.  BREASTS: Symmetrical, no skin changes or visible lesions. No palpable masses, nipple discharge bilaterally.  PELVIC: ATROPHIC EXTERNAL FEMALE GENITALIA without lesions. Normal hair distribution. Adequate perineal body, normal urethral meatus. VAGINA DRY without lesions or discharge. CERVIX STENOTIC without lesions, discharge or tenderness. GRADE 1 CYSTOCELE present. Bimanual exam shows uterus to be normal size, regular, mobile and nontender. Adnexa without masses or tenderness. EXAM DIFFICULT DUE TO BODY HABITUS.  RECTAL: Rectovaginal exam confirms above with normal sphincter tone, no masses.  EXTREMITIES: No edema.    DIAGNOSIS:  1. Well woman exam with routine gynecological exam    2. Postmenopause    3. Pelvic relaxation    4. Urge incontinence of urine    5. Breast cancer screening        PLAN:    Orders Placed This Encounter    Mammo Digital Screening Bilat with [employer]    Ambulatory consult to " Urogynecology    Liquid-based pap smear, screening   Needs colonoscopy    COUNSELING:  The patient was counseled today on:  -types of incontinence and management options including bladder training, timed voiding, Kegel exercises, and the avoidance of bladder irritants in managing mild symptoms conservatively as well as surgical options for correction of anatomic defects, the potential need for urodynamic testing and she was agreeable so appointment was scheduled;  -osteoporosis prevention, calcium supplementation, regular weight bearing exercise;  -A.C.S. Pap and pelvic exam guidelines (pap every 3 years, no pap after age 65) and recommendations for yearly mammogram;  -to see her PCP for other health maintenance.    FOLLOW-UP in two years with Dr Escobar.

## 2018-10-10 ENCOUNTER — HOSPITAL ENCOUNTER (OUTPATIENT)
Dept: RADIOLOGY | Facility: HOSPITAL | Age: 63
Discharge: HOME | End: 2018-10-10
Attending: NURSE PRACTITIONER
Payer: MEDICAID

## 2018-10-10 DIAGNOSIS — Z12.39 BREAST CANCER SCREENING: ICD-10-CM

## 2018-10-10 PROCEDURE — 77063 BREAST TOMOSYNTHESIS BI: CPT | Mod: 26,,, | Performed by: RADIOLOGY

## 2018-10-10 PROCEDURE — 77067 SCR MAMMO BI INCL CAD: CPT | Mod: 26,,, | Performed by: RADIOLOGY

## 2018-10-10 PROCEDURE — 77063 BREAST TOMOSYNTHESIS BI: CPT | Mod: TC

## 2018-10-25 DIAGNOSIS — I10 HYPERTENSION, UNSPECIFIED TYPE: Primary | ICD-10-CM

## 2018-10-25 RX ORDER — HYDROCHLOROTHIAZIDE 25 MG/1
TABLET ORAL
Qty: 60 TABLET | Refills: 2 | Status: SHIPPED | OUTPATIENT
Start: 2018-10-25 | End: 2020-10-08 | Stop reason: CLARIF

## 2019-04-22 ENCOUNTER — TELEPHONE (OUTPATIENT)
Dept: INTERNAL MEDICINE | Facility: CLINIC | Age: 64
End: 2019-04-22

## 2019-04-22 ENCOUNTER — OFFICE VISIT (OUTPATIENT)
Dept: INTERNAL MEDICINE | Facility: CLINIC | Age: 64
End: 2019-04-22
Payer: MEDICAID

## 2019-04-22 VITALS
SYSTOLIC BLOOD PRESSURE: 138 MMHG | BODY MASS INDEX: 32.23 KG/M2 | HEART RATE: 64 BPM | WEIGHT: 193.69 LBS | DIASTOLIC BLOOD PRESSURE: 88 MMHG

## 2019-04-22 DIAGNOSIS — E05.90 PRIMARY HYPERTHYROIDISM: ICD-10-CM

## 2019-04-22 DIAGNOSIS — E78.2 MIXED HYPERLIPIDEMIA: ICD-10-CM

## 2019-04-22 DIAGNOSIS — E05.00 GRAVES DISEASE: ICD-10-CM

## 2019-04-22 DIAGNOSIS — E55.9 VITAMIN D DEFICIENCY: ICD-10-CM

## 2019-04-22 DIAGNOSIS — I10 ESSENTIAL HYPERTENSION: Primary | ICD-10-CM

## 2019-04-22 DIAGNOSIS — I87.2 VENOUS INSUFFICIENCY OF BOTH LOWER EXTREMITIES: ICD-10-CM

## 2019-04-22 PROCEDURE — 99999 PR PBB SHADOW E&M-EST. PATIENT-LVL III: CPT | Mod: PBBFAC,,, | Performed by: INTERNAL MEDICINE

## 2019-04-22 PROCEDURE — 99999 PR PBB SHADOW E&M-EST. PATIENT-LVL III: ICD-10-PCS | Mod: PBBFAC,,, | Performed by: INTERNAL MEDICINE

## 2019-04-22 PROCEDURE — 99214 PR OFFICE/OUTPT VISIT, EST, LEVL IV, 30-39 MIN: ICD-10-PCS | Mod: S$PBB,,, | Performed by: INTERNAL MEDICINE

## 2019-04-22 PROCEDURE — 99213 OFFICE O/P EST LOW 20 MIN: CPT | Mod: PBBFAC | Performed by: INTERNAL MEDICINE

## 2019-04-22 PROCEDURE — 99214 OFFICE O/P EST MOD 30 MIN: CPT | Mod: S$PBB,,, | Performed by: INTERNAL MEDICINE

## 2019-04-23 NOTE — PROGRESS NOTES
Subjective:       Patient ID: Priscilla Lim is a 64 y.o. female.    Chief Complaint: Annual Exam    HPIPleasant lady from [place] here for her annual exam. Overall doing wel and did  Not have any worrisome concerns. She has hx of Grave's disease and is on Tapazole 7.5 mgs daily. She os tolerating this dose well and has no sx's c/w Grave's. She was not administered I-131 given her eye problems she developed as a result if her condition and her endocrinologist opted for the tapazole. She also has a hx of HLP and has been rx'd Lipitor, but she does not take it regularly as she is concerned about its potential side effects. I discussed these in detail and reassured her that we monitor liver functions and others as indicated. I recommended she take her medications as rx'd. She has been having issues with lower back pain. [place] [facility] [place] performed [place] massage on her which helped, Nevertheless, she continues to experience the discomfort periodically. I encouraged her to become more physically active and gave her specific recommendations that should help. These included yoga and similar types of activities.  Review of Systems   All other systems reviewed and are negative.      Objective:      Physical Exam   Constitutional: She is oriented to person, place, and time. She appears well-developed and well-nourished. No distress.   HENT:   Head: Normocephalic and atraumatic.   Right Ear: External ear normal.   Left Ear: External ear normal.   Mouth/Throat: Oropharynx is clear and moist.   Eyes: Pupils are equal, round, and reactive to light. Conjunctivae and EOM are normal. Right eye exhibits no discharge. Left eye exhibits no discharge. No scleral icterus.   Mild exophthalmos.   Neck: Normal range of motion. Neck supple. No JVD present. No thyromegaly present.   Cardiovascular: Normal rate, regular rhythm, normal heart sounds and intact distal pulses.   No murmur heard.  Varicose veins LE.    Pulmonary/Chest: Effort normal and breath sounds normal. No respiratory distress. She has no wheezes. She exhibits no tenderness.   Abdominal: Soft. Bowel sounds are normal. She exhibits no distension and no mass. There is no tenderness.   Musculoskeletal: Normal range of motion. She exhibits no edema or tenderness.   Lymphadenopathy:     She has no cervical adenopathy.   Neurological: She is alert and oriented to person, place, and time. No cranial nerve deficit. Coordination normal.   Skin: Skin is warm and dry. No rash noted. She is not diaphoretic. No erythema.   Psychiatric: She has a normal mood and affect. Her behavior is normal. Judgment and thought content normal.   Nursing note and vitals reviewed.      Assessment:       1. Essential hypertension    2. Mixed hyperlipidemia    3. Venous insufficiency of both lower extremities    4. Primary hyperthyroidism    5. Graves disease        Plan:    1. Obtain blood work.         2. Continue with current medications.         3. RTC for review.

## 2019-08-09 RX ORDER — HYDROCHLOROTHIAZIDE 25 MG/1
TABLET ORAL
Qty: 60 TABLET | Refills: 1 | Status: SHIPPED | OUTPATIENT
Start: 2019-08-09 | End: 2020-10-08 | Stop reason: CLARIF

## 2019-09-04 ENCOUNTER — OFFICE VISIT (OUTPATIENT)
Dept: INTERNAL MEDICINE | Facility: CLINIC | Age: 64
End: 2019-09-04
Payer: MEDICAID

## 2019-09-04 ENCOUNTER — HOSPITAL ENCOUNTER (OUTPATIENT)
Dept: RADIOLOGY | Facility: HOSPITAL | Age: 64
Discharge: HOME | End: 2019-09-04
Attending: INTERNAL MEDICINE
Payer: MEDICAID

## 2019-09-04 VITALS
WEIGHT: 194.38 LBS | HEART RATE: 68 BPM | SYSTOLIC BLOOD PRESSURE: 132 MMHG | DIASTOLIC BLOOD PRESSURE: 90 MMHG | BODY MASS INDEX: 32.35 KG/M2

## 2019-09-04 DIAGNOSIS — M54.50 LOW BACK PAIN, NON-SPECIFIC: ICD-10-CM

## 2019-09-04 DIAGNOSIS — E55.9 VITAMIN D DEFICIENCY: ICD-10-CM

## 2019-09-04 DIAGNOSIS — M53.3 SACRO-ILIAC PAIN: ICD-10-CM

## 2019-09-04 DIAGNOSIS — E78.5 HYPERLIPIDEMIA, UNSPECIFIED HYPERLIPIDEMIA TYPE: ICD-10-CM

## 2019-09-04 DIAGNOSIS — E05.00 GRAVES DISEASE: ICD-10-CM

## 2019-09-04 DIAGNOSIS — I10 ESSENTIAL HYPERTENSION: Primary | ICD-10-CM

## 2019-09-04 PROCEDURE — 72170 X-RAY EXAM OF PELVIS: CPT | Mod: TC

## 2019-09-04 PROCEDURE — 72170 X-RAY EXAM OF PELVIS: CPT | Mod: 26,,, | Performed by: RADIOLOGY

## 2019-09-04 PROCEDURE — 99999 PR PBB SHADOW E&M-EST. PATIENT-LVL III: ICD-10-PCS | Mod: PBBFAC,,, | Performed by: INTERNAL MEDICINE

## 2019-09-04 PROCEDURE — 99999 PR PBB SHADOW E&M-EST. PATIENT-LVL III: CPT | Mod: PBBFAC,,, | Performed by: INTERNAL MEDICINE

## 2019-09-04 PROCEDURE — 72200 X-RAY EXAM SI JOINTS: CPT | Mod: TC

## 2019-09-04 PROCEDURE — 72200 X-RAY EXAM SI JOINTS: CPT | Mod: 26,,, | Performed by: RADIOLOGY

## 2019-09-04 PROCEDURE — 99214 PR OFFICE/OUTPT VISIT, EST, LEVL IV, 30-39 MIN: ICD-10-PCS | Mod: S$PBB,,, | Performed by: INTERNAL MEDICINE

## 2019-09-04 PROCEDURE — 99214 OFFICE O/P EST MOD 30 MIN: CPT | Mod: S$PBB,,, | Performed by: INTERNAL MEDICINE

## 2019-09-04 PROCEDURE — 99213 OFFICE O/P EST LOW 20 MIN: CPT | Mod: PBBFAC,25 | Performed by: INTERNAL MEDICINE

## 2019-09-04 PROCEDURE — 72200 XR SACROILIAC JOINTS PA AND OBLIQUE: ICD-10-PCS | Mod: 26,,, | Performed by: RADIOLOGY

## 2019-09-04 PROCEDURE — 72170 XR PELVIS ROUTINE AP: ICD-10-PCS | Mod: 26,,, | Performed by: RADIOLOGY

## 2019-09-04 RX ORDER — GABAPENTIN 100 MG/1
100 CAPSULE ORAL NIGHTLY
Qty: 30 CAPSULE | Refills: 2 | Status: SHIPPED | OUTPATIENT
Start: 2019-09-04 | End: 2021-05-04

## 2019-09-04 NOTE — PROGRESS NOTES
"Subjective:       Patient ID: Priscilla Lim is a 64 y.o. female.    Chief Complaint: Annual Exam    Reid Lim is here today for her annual exam. She reports issues with persistent pain in her L lower back. Specifically, she is tender at the L SI joint upon palpation of that area. She finds some relief by getting into a "fetal" position at night. Otherwise the pain is present most of the time. We have obtained imaging studies of her lumbar spine in the past that showed degenerative changes.  Will obtain a specific XR of the SI joints and proceed from there. I refilled her gabapentin 100 mgs at bedtime and see how she does. I also discussed  The importnce of certain exercises that may help, including yoga, which she will consider.  Regarding her HTN, she reports issues with headaches in the AM hours. I asked her to keep BP diary and will adjust her med if indicated. She has no focal neuro issues with this and likely her BP is contributing to this. I will see her back and review the diary and treat accordingly.  Review of Systems   All other systems reviewed and are negative.      Objective:      Physical Exam   Constitutional: She appears well-developed and well-nourished. No distress.   HENT:   Head: Normocephalic and atraumatic.   Right Ear: External ear normal.   Left Ear: External ear normal.   Mouth/Throat: Oropharynx is clear and moist. No oropharyngeal exudate.   Neck: Normal range of motion. Neck supple. No JVD present. No thyromegaly present.   Cardiovascular: Normal rate, regular rhythm, normal heart sounds and intact distal pulses.   No murmur heard.  Pulmonary/Chest: Effort normal and breath sounds normal. No respiratory distress. She has no wheezes.   Abdominal: Soft. Bowel sounds are normal. She exhibits no distension and no mass. There is no tenderness.   Musculoskeletal: Normal range of motion. She exhibits tenderness. She exhibits no edema.   Left SI joint pan.   Lymphadenopathy:     She has no " cervical adenopathy.   Skin: Skin is warm and dry. No rash noted. She is not diaphoretic. No erythema.   Psychiatric: She has a normal mood and affect. Her behavior is normal. Judgment and thought content normal.   Nursing note and vitals reviewed.      Assessment:       1. Essential hypertension    2. Sacro-iliac pain    3. Graves disease    4. Hyperlipidemia, unspecified hyperlipidemia type    5. Vitamin D deficiency    6. Low back pain, non-specific        Plan:    1. Obtain blood work.         2. Obtain SI joint XR.         3. Rial of gabapentin 100 mgs at bedtime.         4. Core/back strengthening exercises discussed.         5. Monitor BP; keep diary.         6. RTC 2 weeks for review.

## 2019-09-20 ENCOUNTER — OFFICE VISIT (OUTPATIENT)
Dept: INTERNAL MEDICINE | Facility: CLINIC | Age: 64
End: 2019-09-20
Payer: MEDICAID

## 2019-09-20 DIAGNOSIS — I10 ESSENTIAL HYPERTENSION: Primary | ICD-10-CM

## 2019-09-20 DIAGNOSIS — E05.00 GRAVES' ORBITOPATHY: ICD-10-CM

## 2019-09-20 PROCEDURE — 99212 OFFICE O/P EST SF 10 MIN: CPT | Mod: PBBFAC | Performed by: INTERNAL MEDICINE

## 2019-09-20 PROCEDURE — 99213 PR OFFICE/OUTPT VISIT, EST, LEVL III, 20-29 MIN: ICD-10-PCS | Mod: S$PBB,,, | Performed by: INTERNAL MEDICINE

## 2019-09-20 PROCEDURE — 99999 PR PBB SHADOW E&M-EST. PATIENT-LVL II: ICD-10-PCS | Mod: PBBFAC,,, | Performed by: INTERNAL MEDICINE

## 2019-09-20 PROCEDURE — 99213 OFFICE O/P EST LOW 20 MIN: CPT | Mod: S$PBB,,, | Performed by: INTERNAL MEDICINE

## 2019-09-20 PROCEDURE — 99999 PR PBB SHADOW E&M-EST. PATIENT-LVL II: CPT | Mod: PBBFAC,,, | Performed by: INTERNAL MEDICINE

## 2019-09-23 VITALS — DIASTOLIC BLOOD PRESSURE: 82 MMHG | SYSTOLIC BLOOD PRESSURE: 138 MMHG | HEART RATE: 64 BPM

## 2019-09-23 NOTE — PROGRESS NOTES
Subjective:       Patient ID: Priscilla Lim is a 64 y.o. female.    Chief Complaint: Follow-up and Hypertension    HPIs Carina is here today for scheduled 2 week follow up HTN. She brings a BP diary as requested that shows acceptable numbers outside of the clinic setting. These range in the 120's-130's/70's-80's. She is on low dose HCTZ and tolerating it well. [facility] including [facility] and is enjoying it. I encouraged her to continue. She is requesting follow up appt with Dr Horowitz in Neuro-ophthalmology clinic for evaluation of Grave's orbitopathy. This will be made as indicated.  Review of Systems   All other systems reviewed and are negative.      Objective:      Physical Exam   Constitutional: She appears well-developed and well-nourished. No distress.   Cardiovascular: Normal rate, regular rhythm, normal heart sounds and intact distal pulses.   No murmur heard.  Pulmonary/Chest: Effort normal and breath sounds normal. No respiratory distress. She has no wheezes.   Skin: She is not diaphoretic.   Psychiatric: She has a normal mood and affect. Her behavior is normal.   Nursing note and vitals reviewed.      Assessment:       1. Essential hypertension    2. Graves' orbitopathy        Plan:    1. Continue woth current BP med.         2. Monitor BP periodically.         3. Refer to Ophthalmology.         4. RTC 6 months or sooner.

## 2019-10-21 ENCOUNTER — PATIENT OUTREACH (OUTPATIENT)
Dept: ADMINISTRATIVE | Facility: OTHER | Age: 64
End: 2019-10-21

## 2019-10-24 ENCOUNTER — OFFICE VISIT (OUTPATIENT)
Dept: OPHTHALMOLOGY | Facility: CLINIC | Age: 64
End: 2019-10-24
Payer: MEDICAID

## 2019-10-24 DIAGNOSIS — E05.00 GRAVES' ORBITOPATHY: Primary | ICD-10-CM

## 2019-10-24 PROCEDURE — 99999 PR PBB SHADOW E&M-EST. PATIENT-LVL III: CPT | Mod: PBBFAC,,, | Performed by: OPHTHALMOLOGY

## 2019-10-24 PROCEDURE — 99213 OFFICE O/P EST LOW 20 MIN: CPT | Mod: PBBFAC | Performed by: OPHTHALMOLOGY

## 2019-10-24 PROCEDURE — 99999 PR PBB SHADOW E&M-EST. PATIENT-LVL III: ICD-10-PCS | Mod: PBBFAC,,, | Performed by: OPHTHALMOLOGY

## 2019-10-24 PROCEDURE — 92014 PR EYE EXAM, EST PATIENT,COMPREHESV: ICD-10-PCS | Mod: S$PBB,,, | Performed by: OPHTHALMOLOGY

## 2019-10-24 PROCEDURE — 92014 COMPRE OPH EXAM EST PT 1/>: CPT | Mod: S$PBB,,, | Performed by: OPHTHALMOLOGY

## 2019-10-24 NOTE — PROGRESS NOTES
HPI     DLS:09/13/2018 [facility]   Patient here for overdue annual check.  Patient states    I have personally interviewed the patient, reviewed the history and   examined the patient and agree with the technician's exam.    Uses artificial tears. No significant changes.    Last edited by Carlos Horowitz MD on 10/24/2019  3:40 PM. (History)            Assessment /Plan     For exam results, see Encounter Report.    Graves' orbitopathy      Ms. Thaos visual function and ocular appearance have been stable since her last visit. She is at no risk for visual loss. She will continue to use artificial tears as desired. I will repeat her eye exam in one year.

## 2019-11-07 ENCOUNTER — IMMUNIZATION (OUTPATIENT)
Dept: PHARMACY | Facility: CLINIC | Age: 64
End: 2019-11-07
Payer: MEDICAID

## 2019-11-07 DIAGNOSIS — Z12.11 COLON CANCER SCREENING: ICD-10-CM

## 2020-02-11 RX ORDER — HYDROCHLOROTHIAZIDE 25 MG/1
TABLET ORAL
Qty: 60 TABLET | Refills: 1 | Status: SHIPPED | OUTPATIENT
Start: 2020-02-11 | End: 2020-08-11 | Stop reason: SDUPTHER

## 2020-03-14 ENCOUNTER — OFFICE VISIT (OUTPATIENT)
Dept: URGENT CARE | Facility: CLINIC | Age: 65
End: 2020-03-14
Payer: MEDICAID

## 2020-03-14 VITALS
HEIGHT: 65 IN | TEMPERATURE: 99 F | BODY MASS INDEX: 32.32 KG/M2 | DIASTOLIC BLOOD PRESSURE: 88 MMHG | WEIGHT: 194 LBS | SYSTOLIC BLOOD PRESSURE: 136 MMHG | OXYGEN SATURATION: 97 % | RESPIRATION RATE: 16 BRPM | HEART RATE: 72 BPM

## 2020-03-14 DIAGNOSIS — J06.9 UPPER RESPIRATORY VIRUS: Primary | ICD-10-CM

## 2020-03-14 PROCEDURE — 99213 PR OFFICE/OUTPT VISIT, EST, LEVL III, 20-29 MIN: ICD-10-PCS | Mod: S$GLB,,, | Performed by: PHYSICIAN ASSISTANT

## 2020-03-14 PROCEDURE — 99213 OFFICE O/P EST LOW 20 MIN: CPT | Mod: S$GLB,,, | Performed by: PHYSICIAN ASSISTANT

## 2020-03-14 RX ORDER — FLUTICASONE PROPIONATE 50 MCG
1 SPRAY, SUSPENSION (ML) NASAL DAILY
Qty: 18.2 ML | Refills: 0 | Status: SHIPPED | OUTPATIENT
Start: 2020-03-14 | End: 2021-05-04

## 2020-03-14 RX ORDER — BENZONATATE 200 MG/1
200 CAPSULE ORAL 3 TIMES DAILY PRN
Qty: 30 CAPSULE | Refills: 0 | Status: SHIPPED | OUTPATIENT
Start: 2020-03-14 | End: 2020-03-24

## 2020-03-14 NOTE — PATIENT INSTRUCTIONS
PLEASE READ YOUR DISCHARGE INSTRUCTIONS ENTIRELY AS IT CONTAINS IMPORTANT INFORMATION.  - Rest.    - Drink plenty of fluids.    - Tylenol or Ibuprofen as directed as needed for fever/pain.    - If you were prescribed antibiotics, please take them to completion.  - If you are female and on birth control pills - please use additional methods of contraception to prevent pregnancy while on antibiotics and for one cycle after.   - If you were prescribed a narcotic medication or muscle relaxer, do not drive or operate heavy equipment or machinery while taking these medications, as they can cause drowsiness.   - If you smoke, please stop smoking.  -You must understand that you've received an Urgent Care treatment only and that you may be released before all your medical problems are known or treated. You, the patient, will    arrange for follow up care as instructed. Please arrange follow up with your primary medical clinic as soon as possible.   - Follow up with your PCP or specialty clinic as directed in the next 1-2 weeks if not improved or as needed.  You can call [phone] to schedule an appointment with the appropriate provider.    - Please return to Urgent Care or to the Emergency Department if your symptoms worsen.    Patient aware and verbalized understanding.    Viral Upper Respiratory Illness (Adult)  You have a viral upper respiratory illness (URI), which is another term for the common cold. This illness is contagious during the first few days. It is spread through the air by coughing and sneezing. It may also be spread by direct contact (touching the sick person and then touching your own eyes, nose, or mouth). Frequent handwashing will decrease risk of spread. Most viral illnesses go away within 7 to 10 days with rest and simple home remedies. Sometimes the illness may last for several weeks. Antibiotics will not kill a virus, and they are generally not prescribed for this condition.    Home care  · If  symptoms are severe, rest at home for the first 2 to 3 days. When you resume activity, don't let yourself get too tired.  · Avoid being exposed to cigarette smoke (yours or others).  · You may use acetaminophen or ibuprofen to control pain and fever, unless another medicine was prescribed. (Note: If you have chronic liver or kidney disease, have ever had a stomach ulcer or gastrointestinal bleeding, or are taking blood-thinning medicines, talk with your healthcare provider before using these medicines.) Aspirin should never be given to anyone under 18 years of age who is ill with a viral infection or fever. It may cause severe liver or brain damage.  · Your appetite may be poor, so a light diet is fine. Avoid dehydration by drinking 6 to 8 glasses of fluids per day (water, soft drinks, juices, tea, or soup). Extra fluids will help loosen secretions in the nose and lungs.  · Over-the-counter cold medicines will not shorten the length of time youre sick, but they may be helpful for the following symptoms: cough, sore throat, and nasal and sinus congestion. (Note: Do not use decongestants if you have high blood pressure.)  Follow-up care  Follow up with your healthcare provider, or as advised.  When to seek medical advice  Call your healthcare provider right away if any of these occur:  · Cough with lots of colored sputum (mucus)  · Severe headache; face, neck, or ear pain  · Difficulty swallowing due to throat pain  · Fever of 100.4°F (38°C)  Call 911, or get immediate medical care  Call emergency services right away if any of these occur:  · Chest pain, shortness of breath, wheezing, or difficulty breathing  · Coughing up blood  · Inability to swallow due to throat pain  Date Last Reviewed: 9/13/2015  © 9891-5501 [employer]. [address] [place] [address], [place] [address]. All rights reserved. This information is not intended as a substitute for professional medical care. Always follow your healthcare  professional's instructions.

## 2020-03-14 NOTE — PROGRESS NOTES
"Subjective:       Patient ID: Priscilla Lim is a 65 y.o. female.    Vitals:  height is 5' 5" (1.651 m) and weight is 88 kg (194 lb). Her temperature is 98.5 °F (36.9 °C). Her blood pressure is 136/88 and her pulse is 72. Her respiration is 16 and oxygen saturation is 97%.     Chief Complaint: URI    Ms. Lim presents for evaluation cough, congestion, sore throat, muscle aches, sinus pressure that started yesterday.  No fevers or chills.  No nausea, vomiting, diarrhea.  She has not been taking anything for symptoms.    Patient presents today with URI like symptoms.  Patient reports traveling internationally in the last 30 days.  Patient denies specifically traveling to [place], [place], [place], [place], or [place].  She went to [place] 3 weeks ago.  Patient denies any contact with anyone who has tested positive for corona virus or has been contact with any lab testing for corona virus.      URI    This is a new problem. The current episode started in the past 7 days. The problem has been gradually worsening. There has been no fever. Associated symptoms include congestion, coughing, headaches, sinus pain and a sore throat. Pertinent negatives include no ear pain, nausea, rash, vomiting or wheezing. She has tried nothing for the symptoms.       Constitution: Positive for international travel in last 60 days. Negative for chills, sweating, fatigue and fever.   HENT: Positive for congestion, sinus pain, sinus pressure and sore throat. Negative for ear pain and voice change.    Neck: Negative for painful lymph nodes.   Eyes: Negative for eye redness.   Respiratory: Positive for cough and sputum production. Negative for chest tightness, bloody sputum, COPD, shortness of breath, stridor, wheezing and asthma.    Gastrointestinal: Negative for nausea and vomiting.   Musculoskeletal: Positive for muscle ache.   Skin: Negative for rash.   Allergic/Immunologic: Negative for seasonal allergies and asthma.   Neurological: Positive " for headaches.   Hematologic/Lymphatic: Negative for swollen lymph nodes.       Objective:      Physical Exam   Constitutional: She is oriented to person, place, and time. She appears well-developed and well-nourished. She is cooperative.  Non-toxic appearance. She does not have a sickly appearance. She does not appear ill. No distress.   HENT:   Head: Normocephalic and atraumatic.   Right Ear: Hearing, tympanic membrane, external ear and ear canal normal.   Left Ear: Hearing, tympanic membrane, external ear and ear canal normal.   Nose: Mucosal edema and rhinorrhea present. No nasal deformity. No epistaxis. Right sinus exhibits no maxillary sinus tenderness and no frontal sinus tenderness. Left sinus exhibits no maxillary sinus tenderness and no frontal sinus tenderness.   Mouth/Throat: Uvula is midline and mucous membranes are normal. No trismus in the jaw. Normal dentition. No uvula swelling. Posterior oropharyngeal erythema present. No oropharyngeal exudate or posterior oropharyngeal edema. Tonsils are 2+ on the right. Tonsils are 2+ on the left. No tonsillar exudate.   Eyes: Conjunctivae and lids are normal. No scleral icterus.   Neck: Trachea normal, full passive range of motion without pain and phonation normal. Neck supple. No neck rigidity. No edema and no erythema present.   Cardiovascular: Normal rate, regular rhythm, normal heart sounds, intact distal pulses and normal pulses.   Pulmonary/Chest: Effort normal and breath sounds normal. No stridor. No respiratory distress. She has no decreased breath sounds. She has no wheezes. She has no rhonchi. She has no rales.   Abdominal: Normal appearance.   Musculoskeletal: Normal range of motion. She exhibits no edema or deformity.   Lymphadenopathy:     She has no cervical adenopathy.   Neurological: She is alert and oriented to person, place, and time. She exhibits normal muscle tone. Coordination normal.   Skin: Skin is warm, dry, intact, not diaphoretic and  not pale.   Psychiatric: She has a normal mood and affect. Her speech is normal and behavior is normal. Judgment and thought content normal. Cognition and memory are normal.   Nursing note and vitals reviewed.        Assessment:       1. Upper respiratory virus        Plan:         Upper respiratory virus    Other orders  -     benzonatate (TESSALON) 200 MG capsule; Take 1 capsule (200 mg total) by mouth 3 (three) times daily as needed for Cough (cough).  Dispense: 30 capsule; Refill: 0  -     fluticasone propionate (FLONASE) 50 mcg/actuation nasal spray; 1 spray (50 mcg total) by Each Nostril route once daily.  Dispense: 18.2 mL; Refill: 0      Patient Instructions   PLEASE READ YOUR DISCHARGE INSTRUCTIONS ENTIRELY AS IT CONTAINS IMPORTANT INFORMATION.  - Rest.    - Drink plenty of fluids.    - Tylenol or Ibuprofen as directed as needed for fever/pain.    - If you were prescribed antibiotics, please take them to completion.  - If you are female and on birth control pills - please use additional methods of contraception to prevent pregnancy while on antibiotics and for one cycle after.   - If you were prescribed a narcotic medication or muscle relaxer, do not drive or operate heavy equipment or machinery while taking these medications, as they can cause drowsiness.   - If you smoke, please stop smoking.  -You must understand that you've received an Urgent Care treatment only and that you may be released before all your medical problems are known or treated. You, the patient, will    arrange for follow up care as instructed. Please arrange follow up with your primary medical clinic as soon as possible.   - Follow up with your PCP or specialty clinic as directed in the next 1-2 weeks if not improved or as needed.  You can call [phone] to schedule an appointment with the appropriate provider.    - Please return to Urgent Care or to the Emergency Department if your symptoms worsen.    Patient aware and verbalized  understanding.    Viral Upper Respiratory Illness (Adult)  You have a viral upper respiratory illness (URI), which is another term for the common cold. This illness is contagious during the first few days. It is spread through the air by coughing and sneezing. It may also be spread by direct contact (touching the sick person and then touching your own eyes, nose, or mouth). Frequent handwashing will decrease risk of spread. Most viral illnesses go away within 7 to 10 days with rest and simple home remedies. Sometimes the illness may last for several weeks. Antibiotics will not kill a virus, and they are generally not prescribed for this condition.    Home care  · If symptoms are severe, rest at home for the first 2 to 3 days. When you resume activity, don't let yourself get too tired.  · Avoid being exposed to cigarette smoke (yours or others).  · You may use acetaminophen or ibuprofen to control pain and fever, unless another medicine was prescribed. (Note: If you have chronic liver or kidney disease, have ever had a stomach ulcer or gastrointestinal bleeding, or are taking blood-thinning medicines, talk with your healthcare provider before using these medicines.) Aspirin should never be given to anyone under 18 years of age who is ill with a viral infection or fever. It may cause severe liver or brain damage.  · Your appetite may be poor, so a light diet is fine. Avoid dehydration by drinking 6 to 8 glasses of fluids per day (water, soft drinks, juices, tea, or soup). Extra fluids will help loosen secretions in the nose and lungs.  · Over-the-counter cold medicines will not shorten the length of time youre sick, but they may be helpful for the following symptoms: cough, sore throat, and nasal and sinus congestion. (Note: Do not use decongestants if you have high blood pressure.)  Follow-up care  Follow up with your healthcare provider, or as advised.  When to seek medical advice  Call your healthcare provider  right away if any of these occur:  · Cough with lots of colored sputum (mucus)  · Severe headache; face, neck, or ear pain  · Difficulty swallowing due to throat pain  · Fever of 100.4°F (38°C)  Call 911, or get immediate medical care  Call emergency services right away if any of these occur:  · Chest pain, shortness of breath, wheezing, or difficulty breathing  · Coughing up blood  · Inability to swallow due to throat pain  Date Last Reviewed: 9/13/2015 © 2000-2017 [employer]. [address] [place] [address], [place] [address]. All rights reserved. This information is not intended as a substitute for professional medical care. Always follow your healthcare professional's instructions.

## 2020-03-23 ENCOUNTER — TELEPHONE (OUTPATIENT)
Dept: INTERNAL MEDICINE | Facility: CLINIC | Age: 65
End: 2020-03-23

## 2020-03-23 DIAGNOSIS — F43.9 STRESS: ICD-10-CM

## 2020-03-23 DIAGNOSIS — F41.9 ANXIETY: Primary | ICD-10-CM

## 2020-03-23 RX ORDER — LORAZEPAM 0.5 MG/1
TABLET ORAL
Qty: 30 TABLET | Refills: 1 | Status: SHIPPED | OUTPATIENT
Start: 2020-03-23 | End: 2020-06-05

## 2020-03-23 NOTE — PROGRESS NOTES
Spoke with Mr Carina. She under considerable stress as her  has been admitted to ICU with COVID 19 issues. She is reporting anxiety as the main manifestation of her stress and requested medication to help with this. I sent a limited rx for Ativan 0.5 mgs to take as needed. Risks of long-term use were discussed, therefore a limited rx was provided to her. My staff and I will keep in touch with he thruot this difficult period for her.

## 2020-03-27 ENCOUNTER — OFFICE VISIT (OUTPATIENT)
Dept: URGENT CARE | Facility: CLINIC | Age: 65
End: 2020-03-27
Payer: MEDICAID

## 2020-03-27 ENCOUNTER — TELEPHONE (OUTPATIENT)
Dept: INTERNAL MEDICINE | Facility: CLINIC | Age: 65
End: 2020-03-27

## 2020-03-27 VITALS
OXYGEN SATURATION: 97 % | HEART RATE: 92 BPM | WEIGHT: 194 LBS | RESPIRATION RATE: 18 BRPM | SYSTOLIC BLOOD PRESSURE: 110 MMHG | BODY MASS INDEX: 32.32 KG/M2 | TEMPERATURE: 98 F | HEIGHT: 65 IN | DIASTOLIC BLOOD PRESSURE: 80 MMHG

## 2020-03-27 DIAGNOSIS — R05.9 COUGH: Primary | ICD-10-CM

## 2020-03-27 DIAGNOSIS — J06.9 VIRAL URI WITH COUGH: Primary | ICD-10-CM

## 2020-03-27 DIAGNOSIS — F43.21 GRIEF REACTION: ICD-10-CM

## 2020-03-27 PROCEDURE — U0002 COVID-19 LAB TEST NON-CDC: HCPCS

## 2020-03-27 PROCEDURE — 99214 OFFICE O/P EST MOD 30 MIN: CPT | Mod: S$GLB,,, | Performed by: EMERGENCY MEDICINE

## 2020-03-27 PROCEDURE — 99214 PR OFFICE/OUTPT VISIT, EST, LEVL IV, 30-39 MIN: ICD-10-PCS | Mod: S$GLB,,, | Performed by: EMERGENCY MEDICINE

## 2020-03-27 RX ORDER — PROMETHAZINE HYDROCHLORIDE AND DEXTROMETHORPHAN HYDROBROMIDE 6.25; 15 MG/5ML; MG/5ML
5 SYRUP ORAL EVERY 4 HOURS PRN
Qty: 150 ML | Refills: 0 | Status: SHIPPED | OUTPATIENT
Start: 2020-03-27 | End: 2020-04-06

## 2020-03-27 RX ORDER — ALBUTEROL SULFATE 90 UG/1
2 AEROSOL, METERED RESPIRATORY (INHALATION) EVERY 4 HOURS PRN
Qty: 18 G | Refills: 0 | Status: SHIPPED | OUTPATIENT
Start: 2020-03-27 | End: 2021-05-04

## 2020-03-27 NOTE — PATIENT INSTRUCTIONS
SEE VIRAL URI SHEET  SEE COVID PENDING TEST/COVID SHEET  REST AND HYDRATE WITH PLENTY OF FLUIDS  PROMETHAZINE DM RX FOR COUGH  ALBUTEROL INHALER FOR WHEEZING/TIGHTNESS IN CHEST  TYLENOL 650 MG EVERY 4-6 HOURS FOR FEVER/ACHES  GO TO THE ER FOR WORSENING SYMPTOMS DESPITE TREATMENT, STRUGGLING TO BREATH   TAKE THE ANXIETY/STRESS GRIEF REACTION MEDICINE PRESCRIBED BY DR OSMAN AS NEEDED  STAY CLOSE WITH FAMILY BY PHONE OR USING MASK/HANDWASHING PRECAUTIONS DURING THIS EXTREMELY DIFFICULT TIMES.      Enfermedad Respiratoria Viral [Uri, Viral Respiratory Illness, Adult, No Abx]  Usted tiene michael enfermedad respiratoria de las vías superiores provocada por un virus. Esta enfermedad es contagiosa delmar los primeros días. Se transmite por el aire, por la tos o el estornudo de la persona afectada, o por contacto directo con irene persona (si mercy toca a la persona enferma y después se toca los ojos, la nariz o la boca). La mayoría de las enfermedades virales mejoran en 7-10 días con reposo y simples brooke caseros; aunque, en ocasiones, la enfermedad puede durar varias semanas. Los antibióticos son ineficaces contra los virus, por lo que generalmente no se recetan para esta enfermedad.    Cuidados En [place]:  1) Si los síntomas son severos, descanse en stovall casa delmar los primeros 2 ó 3 días. Cuando reanude colin actividades, evite cansarse demasiado.  2) Evite exponerse al humo de cigarrillo (suyo o de otras personas).  3) Puede usar Tylenol (acetaminofén) o ibuprofeno (Motrin o Advil) para controlar la fiebre o el dolor muscular y el dolor de brian. (La aspirina no debe usarse nunca en personas menores de 18 años enfermas con fiebre, ya que puede causar daños graves al hígado.)  4) Es posible que tenga poco apetito, por lo que michael dieta ligera es adecuada. Para evitar la deshidratación, shan entre 6 y 8 vasos de líquido cada día (agua, refrescos, jugos de fruta, té, sopa etc.). La abundancia de líquido ayuda a  desprender las secreciones de la nariz y los pulmones.  5) Los medicamentos sin receta para el resfriado no acortarán la duración de la enfermedad, ceasar pueden ser útiles para aliviar los siguientes síntomas: tos (Robitussin MD); dolor de garganta (Chloraseptic en comprimidos o spray); congestión nasal y de los senos paranasales (Actifed, Sudafed o Chlortrimeton).  Lázaro michael VISITA DE CONTROL a stovall médico, o según le indiquen, si no se siente mejor delmar la semana próxima.  Busque Prontamente Atención Médica  si algo de lo siguiente ocurre:  -- Tos con esputo de color (mucosidad) o con shwetha.  -- Dolor en el pecho, falta de aire, silbidos o dificultad para respirar.  -- Dolor de brian edison, dolor en la liz, el ganga o los oídos.  -- Fiebre superior a los 100.4º F (38.0º C) delmar más de will días.  -- Incapacidad de tragar a causa del dolor de garganta.  Date Last Reviewed: 9/13/2015  © 9669-1073 [employer]. [address] [place] [id number]. Todos los derechos reservados. Esta información no pretende sustituir la atención médica profesional. Sólo stovall médico puede diagnosticar y tratar un problema de anita.      Departamento de [facility]ita y [facility]  Prevenir la propagación del Coronoavirus 2019 en hogares y comunidades residenciales      Pasos preventivos para personas con COVID-19 o que se sospecha que están infectadas (incluidas personas bajo investigación) que no necesitan estar hospitalizadas y personas infectadas con COVID-19 que hayan estado hospitalizadas ceasar que se determinan medicamente estables para irse a casa.    Stovall médico y el personal de sanidad pública evaluarán si usted puede ser tratado en casa.   Quédese en casa excepto para obtener cuidados médicos.   Sepárese de otras personas y animales en stovall hogar.   Llame por teléfono antes de ir a yisel a stovall médico.   Póngase michael mascarilla.   Tápese al toser y estornudar.   Lávese las raimundo con frecuencia.   Evite  compartir objetos personales en stovall hogar.   Limpie todas las superficies a stovall alcance todos los días.    Monitoree colin síntomas. Consiga ayuda médica si la enfermedad empeora (por ejemplo, dificultad para respirar). Antes de salir a buscar ayuda, llame a stovall proveedor médico.    Si tiene michael emergencia médica y necesita llamar al 911, notifique al personal que responda a stovall llamada de que usted tiene, o está siendo evaluado para COVID-19. Si es posible, póngase michael mascarilla antes de que la ayuda sanitaria llegue.   Dejar de hacer aislamiento en casa. Llame a stovall médico para que le asesore sobre si puede dejar de hacer aislamiento en casa.    Precauciones recomendadas para miembros del mismo hogar, compañeros íntimos y cuidadores, fuera del ámbito médico, de un paciente sintomático confirmado positivo para COVID-19 o un paciente que está siendo investigado.  Miembros del mismo hogar, compañeros íntimos y cuidadores, fuera del ámbito médico, pueden richie estado en contacto con michael persona con síntomas confirmada positivo para COVID-19 o michael persona bajo investigación. Personas con contacto cercano deberían monitorizar stovall anita; deberían llamar a stovall proveedor médico inmediatamente si desarrollan síntomas que sugieren que puede richie contraído COVID-19 (por ejemplo, fiebre, tos, falta de aliento).    Personas con contacto cercano deberían, además, seguir las siguientes recomendaciones:   Asegúrese de que usted puede entender y ayudar al paciente a seguir las instrucciones de stovall proveedor médico, en relación con la medicación y el cuidado a seguir. Debería ayudar al paciente con colin necesidades básicas en casa y ayudar cuando sea necesario a hacer la compra, ir a recoger las recetas médicas y otras necesidades personales.   Monitorear los síntomas del paciente. Si el paciente empeora, llame a stovall proveedor médico y dígale que el paciente connor sido confirmado positivo para COVID-19. [place] ayudará a la oficina de stovall  médico a ovi las medidas adecuadas para evitar que otras personas en la oficina o en la my de espera se infecten. Pida al proveedor médico que llame al departamento de anita del estado para más instrucciones. Si el paciente tiene michael emergencia médica y tiene que llamar al 911, informe al operador que responda a stovall llamada de que el paciente tiene o está siendo evaluado para COVID-19.   Miembros del mismo hogar deberían quedarse en habitaciones separadas del paciente lo rain posible. Miembros del mismo hogar deberían utilizar otro dormitorio y cuarto de baño, si fuera posible.    Prohibir la visita de cualquier persona que no necesite estar en la casa.   Miembros del mismo hogar deberían ocuparse de las mascotas de la casa. No cuide de animales o mascotas mientras esté enfermo.   Asegúrese de que las áreas comunes de la casa tienen buena ventilación, tl aire acondicionado o michael ventana que se pueda abrir si el clima lo permite.   Lávese las raimundo frecuentemente. Lávese las raimundo frecuentemente con jabón y agua delmar al menos 20 segundos o utilice un desinfectante de raimundo con base de alcohol, que contenga entre 60 y 95% de alcohol. Cubriendo todas las superficies de las raimundo y frotándolas juntas hasta que se sequen.   Evite tocarse los ojos, la nariz y la boca antes de haberse lavado las raimundo.   El paciente debería llevar mascarilla cuando esté con otras personas. Si el paciente no se puede poner michael mascarilla porque, por ejemplo, tiene dificultad para respirar, usted, tl cuidador, debería ponerse michael mascarilla cuando esté en la misma habitación que el paciente.   Utilice michael mascarilla desechable y guantes cuando toque o esté en contacto con la shwetha del paciente, colin heces, colin fluidos corporales tales tl saliva, esputo, mucosidad nasal, vómito, orina.   o Tire las mascarillas desechables y los guantes edmar pues de utilizarlos. No los reutilice.  o Cuando se quite la protección, claudia  quítese los guantes. Inmediatamente después lávese las raimundo con agua y jabón o con un desinfectante de raimundo con base de alcohol. Después quítese y tire la mascarilla, e inmediatamente después lávese las raimundo otra vez con agua y jabón o utilice un desinfectante de raimundo con base de alcohol.   Evite compartir objetos del hogar con el paciente. No debería compartir platos, vasos, tazas, cubiertos, toallas, ropa de cama u otros objetos. Después de que el paciente utilice estos objetos debe lavarlos minuciosamente (lilliana debajo Xu la colada minuciosamente).   Limpie todas las superficies de fácil alcance, tl las encimeras, las mesas, los pomos de las zamzam, los picaportes del baño, el retrete, teléfonos, teclados, tabletas y las mesillas de noche, todos los días. Además, lave cualquier superficie que pueda tener shwetha, heces o fluidos corporales.   Utilice los productos de limpieza o las toallitas según indiquen las etiquetas de los mismos. Las etiquetas contienen la información para utilizar estos productos de manera guillen y eficiente, además de las precauciones que debe ovi al utilizar dichos productos, tales tl el uso de guantes o buena ventilación.   Lave la colada minuciosamente.  o Retire inmediatamente cualquier prenda o ropa de cama que tenga shwetha, heces o fluidos corporales.  o Utilice guantes desechables cuando toque objetos sucios y separe los objetos sucios de stovall cuerpo. Lávese las raimundo (con jabón y agua o con un desinfectante de raimundo con base de alcohol) inmediatamente después de quitarse los guantes.  o Vidya y siga las instrucciones en las etiquetas de la ropa y el detergente. En general, utilice un detergente corriente siguiendo las instrucciones de la lavadora y séquelo meticulosamente utilizando la temperatura mas huy recomendada en la etiqueta de la ropa.   Coloque todos los guantes desechables, las mascarillas y otros objetos contaminados en un contenedor reforzado antes de  tirarlo junto con otros desechos del [facility]. Lávese las raimundo (con jabón y agua o con un desinfectante de raimundo con base de alcohol) inmediatamente después de tocar estos objetos. Si las raimundo están visiblemente sucias se recomienda lavarlas con agua y con jabón.    Consulte cualquier otra pregunta con stovall departamento de anita local o estatal o con stovall proveedor médico. Compruebe las horas en las que se puede contactar al departamento de anita local.    Para más información, siga el enlace del CDC que encontrará a continuación.    https://www.cdc.gov/coronavirus/2019-ncov/hcp/guidance-prevent-spread-sp.html   El dolor de michael pérdida  La pérdida de un ser querido o de algo muy importante es michael experiencia difícil que siempre va seguida de iglesias y dolor. Es un proceso normal que manifiesta señales físicas y emocionales. Jarett incluso en el elsa de cambios radicales, tl la muerte de un cónyuge o de un padre o madre, es posible enfrentar la pérdida y seguir adelante.    Pérdidas que nos apenan mucho  Todo el jorge luis siente michael pérdida importante en algún momento de stovall estefania. Estos son algunos de los tipos de pérdidas a los que natanael vez usted se esté enfrentando:  · La muerte de un familiar o amigo.  · Problemas de anita tl lesiones permanentes, enfermedades crónicas o el envejecimiento.  · El fin de michael relación amorosa, michael separación o un divorcio.  · La pérdida de stovall trabajo, un cambio en el nivel de ingresos u otros cambios en stovall estefania.  Cómo nos afecta michael pérdida  La pérdida mery un vacío en el lugar de nuestra estefania donde se encontraba lo que perdimos, y puede afectar a la imagen que tenemos de nosotros mismos. La rutina diaria cambia cuando michael persona ha perdido a stovall compañero(a), a un hijo, a stovall padre o a stovall madre. La imagen que usted tiene de sí mismo puede cambiar si ya no está jacob. Después de un divorcio, es posible que deba seguir adelante por colin propios medios.  El dolor de michael pérdida adopta muchas  formas  El dolor no consiste sólo en tristeza. Kelley le hace experimentar toda michael laureen de sentimientos intensos. De hecho, el dolor algunas veces se describe tl el paso por madan etapas: negación, enojo, jay, depresión y aceptación. Se puede pasar de michael etapa a otra sin seguir un orden en particular. Así que de un momento para otro:  · No quiere creer que la pérdida haya ocurrido de verdad.  · Se siente enojado o completamente enfurecido.  · Piensa que podría richie hecho algo para evitar la pérdida.  · Tiene momentos de profunda tristeza o desesperanza.  · Se siente culpable por sentirse aliviado.  · Acepta que la pérdida es real y que puede enfrentarla.  Date Last Reviewed: 11/3/2015  © 7985-9203 [employer]. [address] [place] [address], [place] [address]. Todos los derechos reservados. Esta información no pretende sustituir la atención médica profesional. Sólo stovall médico puede diagnosticar y tratar un problema de anita.

## 2020-03-27 NOTE — PROGRESS NOTES
"Subjective:       Patient ID: Priscilla Lim is a 65 y.o. female.    Vitals:  height is 5' 5" (1.651 m) and weight is 88 kg (194 lb 0.1 oz). Her temperature is 98 °F (36.7 °C). Her blood pressure is 110/80 and her pulse is 92. Her respiration is 18 and oxygen saturation is 97%.     Chief Complaint: URI    Pt was exposed to her  that had tested positive and passed yesterday of Covid-19. She only speaks Ivorian. Reports feeling chills,SOB, cough and headache,       HX OF AUTOIMMUNE DISEASE,   FROM COVID 19 COMPLICATION YESTERDAY AND POSITIVE CLOSE CONTACT, WITH LOW GRADE FEVER, COUGH, SOB. SENT BY DR OSMAN FOR EVALUATION AND SWABBING FOR COVID 19. HE ALREADY PRESCRIBED HER BZD FOR ANXIETY/ACUTE GRIEF. CURRENTLY NO SOB BUT HAS HAD SOME RECENTLY, AND ADMITS STRESS/GRIEF/ANXIETY A PART OF THIS.    URI    This is a new problem. The current episode started 1 to 4 weeks ago ([id number] ). The problem has been unchanged. Associated symptoms include coughing and headaches. Pertinent negatives include no congestion, ear pain, nausea, rash, sinus pain, sore throat, vomiting or wheezing.       Constitution: Positive for chills and fatigue. Negative for sweating and fever.   HENT: Negative for ear pain, congestion, sinus pain, sinus pressure, sore throat and voice change.    Neck: Negative for painful lymph nodes.   Eyes: Negative for eye redness.   Respiratory: Positive for cough and shortness of breath. Negative for chest tightness, sputum production, bloody sputum, COPD, stridor, wheezing and asthma.    Gastrointestinal: Negative for nausea and vomiting.   Musculoskeletal: Negative for muscle ache.   Skin: Negative for rash.   Allergic/Immunologic: Negative for seasonal allergies and asthma.   Neurological: Positive for light-headedness and headaches.   Hematologic/Lymphatic: Negative for swollen lymph nodes.       Objective:      Physical Exam   Constitutional: She is oriented to person, place, and time. " She appears well-developed and well-nourished. She is cooperative.  Non-toxic appearance. She does not have a sickly appearance. She does not appear ill.   ANXIOUS AND UNDERSTANDABLY TEARFUL   HENT:   Head: Normocephalic and atraumatic.   Right Ear: Hearing, tympanic membrane, external ear and ear canal normal.   Left Ear: Hearing, tympanic membrane, external ear and ear canal normal.   Nose: Nose normal. No mucosal edema, rhinorrhea or nasal deformity. No epistaxis. Right sinus exhibits no maxillary sinus tenderness and no frontal sinus tenderness. Left sinus exhibits no maxillary sinus tenderness and no frontal sinus tenderness.   Mouth/Throat: Uvula is midline, oropharynx is clear and moist and mucous membranes are normal. No trismus in the jaw. Normal dentition. No uvula swelling. No oropharyngeal exudate, posterior oropharyngeal edema or posterior oropharyngeal erythema.   Eyes: Conjunctivae and lids are normal. No scleral icterus.   Neck: Trachea normal, full passive range of motion without pain and phonation normal. Neck supple. No neck rigidity. No edema and no erythema present.   Cardiovascular: Normal rate, regular rhythm, normal heart sounds, intact distal pulses and normal pulses.   Pulmonary/Chest: Effort normal and breath sounds normal. No respiratory distress. She has no decreased breath sounds. She has no rhonchi.   Abdominal: Normal appearance.   Musculoskeletal: Normal range of motion. She exhibits no edema or deformity.   Neurological: She is alert and oriented to person, place, and time. She exhibits normal muscle tone. Coordination normal.   Skin: Skin is warm, dry, intact, not diaphoretic and not pale.   Psychiatric: She has a normal mood and affect. Her speech is normal. Cognition and memory are normal.   ANXIOUS, TEARFUL AT THE LOSS OF    Nursing note and vitals reviewed.      COVID PENDING    Assessment:       1. Viral URI with cough    2. Grief reaction        Plan:         Viral URI  with cough  -     SARS- CoV-2 (COVID-19) QUALITATIVE PCR    Grief reaction    Other orders  -     promethazine-dextromethorphan (PROMETHAZINE-DM) 6.25-15 mg/5 mL Syrp; Take 5 mLs by mouth every 4 (four) hours as needed.  Dispense: 150 mL; Refill: 0  -     albuterol (PROVENTIL/VENTOLIN HFA) 90 mcg/actuation inhaler; Inhale 2 puffs into the lungs every 4 (four) hours as needed for Wheezing or Shortness of Breath. Rescue  Dispense: 18 g; Refill: 0

## 2020-03-28 LAB — SARS-COV-2 RNA RESP QL NAA+PROBE: NOT DETECTED

## 2020-03-29 ENCOUNTER — TELEPHONE (OUTPATIENT)
Dept: URGENT CARE | Facility: CLINIC | Age: 65
End: 2020-03-29

## 2020-07-15 DIAGNOSIS — E55.9 VITAMIN D DEFICIENCY: ICD-10-CM

## 2020-07-15 DIAGNOSIS — E78.2 MIXED HYPERLIPIDEMIA: ICD-10-CM

## 2020-07-15 DIAGNOSIS — I10 ESSENTIAL HYPERTENSION: Primary | ICD-10-CM

## 2020-07-15 DIAGNOSIS — E05.90 PRIMARY HYPERTHYROIDISM: ICD-10-CM

## 2020-07-22 ENCOUNTER — OFFICE VISIT (OUTPATIENT)
Dept: INTERNAL MEDICINE | Facility: CLINIC | Age: 65
End: 2020-07-22
Payer: MEDICAID

## 2020-07-22 DIAGNOSIS — E78.2 MIXED HYPERLIPIDEMIA: ICD-10-CM

## 2020-07-22 DIAGNOSIS — I10 ESSENTIAL HYPERTENSION: ICD-10-CM

## 2020-07-22 DIAGNOSIS — Z00.00 ROUTINE GENERAL MEDICAL EXAMINATION AT A HEALTH CARE FACILITY: Primary | ICD-10-CM

## 2020-07-22 PROCEDURE — 99999 PR PBB SHADOW E&M-EST. PATIENT-LVL II: CPT | Mod: PBBFAC,,, | Performed by: INTERNAL MEDICINE

## 2020-07-22 PROCEDURE — 99214 PR OFFICE/OUTPT VISIT, EST, LEVL IV, 30-39 MIN: ICD-10-PCS | Mod: S$PBB,,, | Performed by: INTERNAL MEDICINE

## 2020-07-22 PROCEDURE — 99214 OFFICE O/P EST MOD 30 MIN: CPT | Mod: S$PBB,,, | Performed by: INTERNAL MEDICINE

## 2020-07-22 PROCEDURE — 99999 PR PBB SHADOW E&M-EST. PATIENT-LVL II: ICD-10-PCS | Mod: PBBFAC,,, | Performed by: INTERNAL MEDICINE

## 2020-07-22 PROCEDURE — 99212 OFFICE O/P EST SF 10 MIN: CPT | Mod: PBBFAC | Performed by: INTERNAL MEDICINE

## 2020-07-27 VITALS — DIASTOLIC BLOOD PRESSURE: 96 MMHG | SYSTOLIC BLOOD PRESSURE: 142 MMHG | HEART RATE: 64 BPM

## 2020-07-27 NOTE — PROGRESS NOTES
Subjective:       Patient ID: Priscilla Lim is a 65 y.o. female.    Chief Complaint: Annual Exam    HPI Mrs Lim is here today for her annual exam. Overall doing well health-wise, but she is still grieving the loss of her  to COVID earlier in the year. This has been difficult for her to adjust, but is making the best of it. In spite of her close proximity to her , she tested negative for the virus and has maintained her isolation as indicated. She has noted a 2-3 week period of neck/upper back stiffness, especially in the morning hours. She denies any activities that may have precipitated this, but she has hx of HTN and is on HCTZ for this. There is some component of pressure in her head at the same time that might indicate BP not adequately controlled given her current emotional state. I discussed adding a medication, but she will try to exercise, monitor her BP and keep diary and I will make adjustments in 2 weeks if sx's persist. There are no worrisome signs in her symptoms and will monitor closely.  I gave her copies of her blood work obtained earlier and discussed them in detail. These showed elevated cholesterol level at 268 with excellent HDL fraction. Nevertheless, I restarted her on Lipitor which she had stopped and will repeat lipids in 2 months for comparison. All other parameters were unremarkable.  Review of Systems   All other systems reviewed and are negative.        Objective:      Physical Exam  Vitals signs and nursing note reviewed.   Constitutional:       General: She is not in acute distress.     Appearance: Normal appearance. She is obese. She is not ill-appearing.   HENT:      Head: Normocephalic and atraumatic.      Right Ear: Tympanic membrane and ear canal normal.      Left Ear: Tympanic membrane and ear canal normal.   Eyes:      General:         Left eye: No discharge.      Extraocular Movements: Extraocular movements intact.      Conjunctiva/sclera: Conjunctivae normal.       Pupils: Pupils are equal, round, and reactive to light.   Neck:      Musculoskeletal: Normal range of motion and neck supple. No neck rigidity or muscular tenderness.      Vascular: No carotid bruit.   Cardiovascular:      Rate and Rhythm: Regular rhythm.      Pulses: Normal pulses.      Heart sounds: Normal heart sounds. No murmur.   Pulmonary:      Effort: Pulmonary effort is normal. No respiratory distress.      Breath sounds: Normal breath sounds. No wheezing.   Abdominal:      General: Abdomen is flat. Bowel sounds are normal. There is no distension.      Palpations: Abdomen is soft. There is no mass.      Tenderness: There is no abdominal tenderness.   Musculoskeletal: Normal range of motion.         General: No swelling or tenderness.      Right lower leg: No edema.      Left lower leg: No edema.   Lymphadenopathy:      Cervical: No cervical adenopathy.   Skin:     General: Skin is warm and dry.      Coloration: Skin is not jaundiced.      Findings: No erythema or rash.   Neurological:      General: No focal deficit present.      Mental Status: She is alert and oriented to person, place, and time.      Cranial Nerves: No cranial nerve deficit.      Motor: No weakness.      Gait: Gait normal.   Psychiatric:         Mood and Affect: Mood normal.         Behavior: Behavior normal.         Thought Content: Thought content normal.         Judgment: Judgment normal.         Assessment:       1. Routine general medical examination at a health care facility    2. Essential hypertension    3. Mixed hyperlipidemia        Plan:    1. Resume Lipitor 20 mgs daily.         2. Continue with HCTZ and other medications.         3. Monitor BP; keep diary.         4. Repeat lipids in 2 months.         5. RTC 2 weeks for BP check and repeat lipids in 2 months.

## 2020-10-08 ENCOUNTER — HOSPITAL ENCOUNTER (OUTPATIENT)
Facility: HOSPITAL | Age: 65
Discharge: HOME | End: 2020-10-13
Attending: EMERGENCY MEDICINE | Admitting: STUDENT IN AN ORGANIZED HEALTH CARE EDUCATION/TRAINING PROGRAM
Payer: MEDICAID

## 2020-10-08 DIAGNOSIS — K80.20 CALCULUS OF GALLBLADDER WITHOUT CHOLECYSTITIS WITHOUT OBSTRUCTION: Primary | ICD-10-CM

## 2020-10-08 DIAGNOSIS — E87.6 HYPOKALEMIA: ICD-10-CM

## 2020-10-08 DIAGNOSIS — R10.13 EPIGASTRIC PAIN: ICD-10-CM

## 2020-10-08 PROCEDURE — 96361 HYDRATE IV INFUSION ADD-ON: CPT | Mod: 59

## 2020-10-08 PROCEDURE — 96376 TX/PRO/DX INJ SAME DRUG ADON: CPT | Mod: 59

## 2020-10-08 PROCEDURE — 80047 BASIC METABLC PNL IONIZED CA: CPT

## 2020-10-08 PROCEDURE — 96374 THER/PROPH/DIAG INJ IV PUSH: CPT

## 2020-10-08 PROCEDURE — 99285 PR EMERGENCY DEPT VISIT,LEVEL V: ICD-10-PCS | Mod: ,,, | Performed by: EMERGENCY MEDICINE

## 2020-10-08 PROCEDURE — 96375 TX/PRO/DX INJ NEW DRUG ADDON: CPT

## 2020-10-08 PROCEDURE — 99285 EMERGENCY DEPT VISIT HI MDM: CPT | Mod: 25

## 2020-10-08 PROCEDURE — 99285 EMERGENCY DEPT VISIT HI MDM: CPT | Mod: ,,, | Performed by: EMERGENCY MEDICINE

## 2020-10-09 ENCOUNTER — ANESTHESIA EVENT (OUTPATIENT)
Dept: ENDOSCOPY | Facility: HOSPITAL | Age: 65
End: 2020-10-09
Payer: MEDICAID

## 2020-10-09 ENCOUNTER — ANESTHESIA (OUTPATIENT)
Dept: ENDOSCOPY | Facility: HOSPITAL | Age: 65
End: 2020-10-09
Payer: MEDICAID

## 2020-10-09 PROBLEM — R10.9 ABDOMINAL PAIN: Status: ACTIVE | Noted: 2020-10-09

## 2020-10-09 LAB
ALBUMIN SERPL BCP-MCNC: 3.8 G/DL (ref 3.5–5.2)
ALBUMIN SERPL BCP-MCNC: 4 G/DL (ref 3.5–5.2)
ALP SERPL-CCNC: 67 U/L (ref 55–135)
ALP SERPL-CCNC: 69 U/L (ref 55–135)
ALT SERPL W/O P-5'-P-CCNC: 17 U/L (ref 10–44)
ALT SERPL W/O P-5'-P-CCNC: 20 U/L (ref 10–44)
AMORPH CRY UR QL COMP ASSIST: ABNORMAL
ANION GAP SERPL CALC-SCNC: 12 MMOL/L (ref 8–16)
ANION GAP SERPL CALC-SCNC: 14 MMOL/L (ref 8–16)
AST SERPL-CCNC: 14 U/L (ref 10–40)
AST SERPL-CCNC: 22 U/L (ref 10–40)
BASOPHILS # BLD AUTO: 0.03 K/UL (ref 0–0.2)
BASOPHILS # BLD AUTO: 0.05 K/UL (ref 0–0.2)
BASOPHILS NFR BLD: 0.3 % (ref 0–1.9)
BASOPHILS NFR BLD: 0.5 % (ref 0–1.9)
BILIRUB SERPL-MCNC: 0.3 MG/DL (ref 0.1–1)
BILIRUB SERPL-MCNC: 0.4 MG/DL (ref 0.1–1)
BILIRUB UR QL STRIP: NEGATIVE
BUN SERPL-MCNC: 10 MG/DL (ref 6–30)
BUN SERPL-MCNC: 12 MG/DL (ref 8–23)
BUN SERPL-MCNC: 8 MG/DL (ref 8–23)
CALCIUM SERPL-MCNC: 8.5 MG/DL (ref 8.7–10.5)
CALCIUM SERPL-MCNC: 9 MG/DL (ref 8.7–10.5)
CHLORIDE SERPL-SCNC: 101 MMOL/L (ref 95–110)
CHLORIDE SERPL-SCNC: 101 MMOL/L (ref 95–110)
CHLORIDE SERPL-SCNC: 98 MMOL/L (ref 95–110)
CLARITY UR REFRACT.AUTO: ABNORMAL
CO2 SERPL-SCNC: 25 MMOL/L (ref 23–29)
CO2 SERPL-SCNC: 27 MMOL/L (ref 23–29)
COLOR UR AUTO: YELLOW
CREAT SERPL-MCNC: 0.6 MG/DL (ref 0.5–1.4)
CREAT SERPL-MCNC: 0.6 MG/DL (ref 0.5–1.4)
CREAT SERPL-MCNC: 0.7 MG/DL (ref 0.5–1.4)
CTP QC/QA: YES
DIFFERENTIAL METHOD: ABNORMAL
DIFFERENTIAL METHOD: ABNORMAL
EOSINOPHIL # BLD AUTO: 0 K/UL (ref 0–0.5)
EOSINOPHIL # BLD AUTO: 0.1 K/UL (ref 0–0.5)
EOSINOPHIL NFR BLD: 0.1 % (ref 0–8)
EOSINOPHIL NFR BLD: 0.9 % (ref 0–8)
ERYTHROCYTE [DISTWIDTH] IN BLOOD BY AUTOMATED COUNT: 13.4 % (ref 11.5–14.5)
ERYTHROCYTE [DISTWIDTH] IN BLOOD BY AUTOMATED COUNT: 13.6 % (ref 11.5–14.5)
EST. GFR  (AFRICAN AMERICAN): >60 ML/MIN/1.73 M^2
EST. GFR  (AFRICAN AMERICAN): >60 ML/MIN/1.73 M^2
EST. GFR  (NON AFRICAN AMERICAN): >60 ML/MIN/1.73 M^2
EST. GFR  (NON AFRICAN AMERICAN): >60 ML/MIN/1.73 M^2
GLUCOSE SERPL-MCNC: 137 MG/DL (ref 70–110)
GLUCOSE SERPL-MCNC: 141 MG/DL (ref 70–110)
GLUCOSE SERPL-MCNC: 146 MG/DL (ref 70–110)
GLUCOSE UR QL STRIP: NEGATIVE
HCT VFR BLD AUTO: 41.5 % (ref 37–48.5)
HCT VFR BLD AUTO: 42 % (ref 37–48.5)
HCT VFR BLD CALC: 43 %PCV (ref 36–54)
HGB BLD-MCNC: 13.5 G/DL (ref 12–16)
HGB BLD-MCNC: 13.6 G/DL (ref 12–16)
HGB UR QL STRIP: NEGATIVE
IMM GRANULOCYTES # BLD AUTO: 0.01 K/UL (ref 0–0.04)
IMM GRANULOCYTES # BLD AUTO: 0.03 K/UL (ref 0–0.04)
IMM GRANULOCYTES NFR BLD AUTO: 0.1 % (ref 0–0.5)
IMM GRANULOCYTES NFR BLD AUTO: 0.3 % (ref 0–0.5)
KETONES UR QL STRIP: NEGATIVE
LEUKOCYTE ESTERASE UR QL STRIP: ABNORMAL
LIPASE SERPL-CCNC: 7 U/L (ref 4–60)
LYMPHOCYTES # BLD AUTO: 1.1 K/UL (ref 1–4.8)
LYMPHOCYTES # BLD AUTO: 1.6 K/UL (ref 1–4.8)
LYMPHOCYTES NFR BLD: 10.7 % (ref 18–48)
LYMPHOCYTES NFR BLD: 17.1 % (ref 18–48)
MAGNESIUM SERPL-MCNC: 1.7 MG/DL (ref 1.6–2.6)
MAGNESIUM SERPL-MCNC: 1.9 MG/DL (ref 1.6–2.6)
MCH RBC QN AUTO: 28 PG (ref 27–31)
MCH RBC QN AUTO: 28.4 PG (ref 27–31)
MCHC RBC AUTO-ENTMCNC: 32.4 G/DL (ref 32–36)
MCHC RBC AUTO-ENTMCNC: 32.5 G/DL (ref 32–36)
MCV RBC AUTO: 86 FL (ref 82–98)
MCV RBC AUTO: 87 FL (ref 82–98)
MICROSCOPIC COMMENT: ABNORMAL
MONOCYTES # BLD AUTO: 0.5 K/UL (ref 0.3–1)
MONOCYTES # BLD AUTO: 0.6 K/UL (ref 0.3–1)
MONOCYTES NFR BLD: 4.5 % (ref 4–15)
MONOCYTES NFR BLD: 6 % (ref 4–15)
NEUTROPHILS # BLD AUTO: 7 K/UL (ref 1.8–7.7)
NEUTROPHILS # BLD AUTO: 8.5 K/UL (ref 1.8–7.7)
NEUTROPHILS NFR BLD: 75.4 % (ref 38–73)
NEUTROPHILS NFR BLD: 84.1 % (ref 38–73)
NITRITE UR QL STRIP: NEGATIVE
NRBC BLD-RTO: 0 /100 WBC
NRBC BLD-RTO: 0 /100 WBC
PH UR STRIP: 7 [PH] (ref 5–8)
PHOSPHATE SERPL-MCNC: 3 MG/DL (ref 2.7–4.5)
PLATELET # BLD AUTO: 218 K/UL (ref 150–350)
PLATELET # BLD AUTO: 248 K/UL (ref 150–350)
PMV BLD AUTO: 10.2 FL (ref 9.2–12.9)
PMV BLD AUTO: 10.8 FL (ref 9.2–12.9)
POC IONIZED CALCIUM: 1.12 MMOL/L (ref 1.06–1.42)
POC TCO2 (MEASURED): 25 MMOL/L (ref 23–29)
POTASSIUM BLD-SCNC: 2.8 MMOL/L (ref 3.5–5.1)
POTASSIUM SERPL-SCNC: 2.9 MMOL/L (ref 3.5–5.1)
POTASSIUM SERPL-SCNC: 3.4 MMOL/L (ref 3.5–5.1)
PROT SERPL-MCNC: 6.7 G/DL (ref 6–8.4)
PROT SERPL-MCNC: 7 G/DL (ref 6–8.4)
PROT UR QL STRIP: NEGATIVE
RBC # BLD AUTO: 4.75 M/UL (ref 4–5.4)
RBC # BLD AUTO: 4.86 M/UL (ref 4–5.4)
RBC #/AREA URNS AUTO: 3 /HPF (ref 0–4)
SAMPLE: ABNORMAL
SARS-COV-2 RDRP RESP QL NAA+PROBE: NEGATIVE
SODIUM BLD-SCNC: 139 MMOL/L (ref 136–145)
SODIUM SERPL-SCNC: 140 MMOL/L (ref 136–145)
SODIUM SERPL-SCNC: 140 MMOL/L (ref 136–145)
SP GR UR STRIP: 1.02 (ref 1–1.03)
SQUAMOUS #/AREA URNS AUTO: 1 /HPF
URN SPEC COLLECT METH UR: ABNORMAL
WBC # BLD AUTO: 10.1 K/UL (ref 3.9–12.7)
WBC # BLD AUTO: 9.32 K/UL (ref 3.9–12.7)
WBC #/AREA URNS AUTO: 3 /HPF (ref 0–5)

## 2020-10-09 PROCEDURE — 94761 N-INVAS EAR/PLS OXIMETRY MLT: CPT

## 2020-10-09 PROCEDURE — G0378 HOSPITAL OBSERVATION PER HR: HCPCS

## 2020-10-09 PROCEDURE — 85025 COMPLETE CBC W/AUTO DIFF WBC: CPT

## 2020-10-09 PROCEDURE — 88342 IMHCHEM/IMCYTCHM 1ST ANTB: CPT | Performed by: PATHOLOGY

## 2020-10-09 PROCEDURE — 81001 URINALYSIS AUTO W/SCOPE: CPT

## 2020-10-09 PROCEDURE — 37000009 HC ANESTHESIA EA ADD 15 MINS: Performed by: INTERNAL MEDICINE

## 2020-10-09 PROCEDURE — 43239 EGD BIOPSY SINGLE/MULTIPLE: CPT | Performed by: INTERNAL MEDICINE

## 2020-10-09 PROCEDURE — 25000003 PHARM REV CODE 250: Performed by: STUDENT IN AN ORGANIZED HEALTH CARE EDUCATION/TRAINING PROGRAM

## 2020-10-09 PROCEDURE — 25000003 PHARM REV CODE 250: Performed by: PHYSICIAN ASSISTANT

## 2020-10-09 PROCEDURE — D9220A PRA ANESTHESIA: ICD-10-PCS | Mod: ANES,,, | Performed by: STUDENT IN AN ORGANIZED HEALTH CARE EDUCATION/TRAINING PROGRAM

## 2020-10-09 PROCEDURE — 25000003 PHARM REV CODE 250: Performed by: NURSE ANESTHETIST, CERTIFIED REGISTERED

## 2020-10-09 PROCEDURE — 80053 COMPREHEN METABOLIC PANEL: CPT

## 2020-10-09 PROCEDURE — D9220A PRA ANESTHESIA: Mod: ANES,,, | Performed by: STUDENT IN AN ORGANIZED HEALTH CARE EDUCATION/TRAINING PROGRAM

## 2020-10-09 PROCEDURE — 27201012 HC FORCEPS, HOT/COLD, DISP: Performed by: INTERNAL MEDICINE

## 2020-10-09 PROCEDURE — 43239 PR EGD, FLEX, W/BIOPSY, SGL/MULTI: ICD-10-PCS | Mod: ,,, | Performed by: INTERNAL MEDICINE

## 2020-10-09 PROCEDURE — 88305 TISSUE EXAM BY PATHOLOGIST: CPT | Performed by: PATHOLOGY

## 2020-10-09 PROCEDURE — 96372 THER/PROPH/DIAG INJ SC/IM: CPT

## 2020-10-09 PROCEDURE — 43239 EGD BIOPSY SINGLE/MULTIPLE: CPT | Mod: ,,, | Performed by: INTERNAL MEDICINE

## 2020-10-09 PROCEDURE — 88305 TISSUE EXAM BY PATHOLOGIST: ICD-10-PCS | Mod: 26,,, | Performed by: PATHOLOGY

## 2020-10-09 PROCEDURE — 88342 CHG IMMUNOCYTOCHEMISTRY: ICD-10-PCS | Mod: 26,,, | Performed by: PATHOLOGY

## 2020-10-09 PROCEDURE — 83735 ASSAY OF MAGNESIUM: CPT

## 2020-10-09 PROCEDURE — 83735 ASSAY OF MAGNESIUM: CPT | Mod: 91

## 2020-10-09 PROCEDURE — 96361 HYDRATE IV INFUSION ADD-ON: CPT

## 2020-10-09 PROCEDURE — 84100 ASSAY OF PHOSPHORUS: CPT

## 2020-10-09 PROCEDURE — 99204 OFFICE O/P NEW MOD 45 MIN: CPT | Mod: 25,,, | Performed by: INTERNAL MEDICINE

## 2020-10-09 PROCEDURE — 63600175 PHARM REV CODE 636 W HCPCS: Performed by: STUDENT IN AN ORGANIZED HEALTH CARE EDUCATION/TRAINING PROGRAM

## 2020-10-09 PROCEDURE — 00731 ANES UPR GI NDSC PX NOS: CPT | Performed by: INTERNAL MEDICINE

## 2020-10-09 PROCEDURE — 63600175 PHARM REV CODE 636 W HCPCS: Performed by: NURSE ANESTHETIST, CERTIFIED REGISTERED

## 2020-10-09 PROCEDURE — 93010 EKG 12-LEAD: ICD-10-PCS | Mod: ,,, | Performed by: INTERNAL MEDICINE

## 2020-10-09 PROCEDURE — 25000242 PHARM REV CODE 250 ALT 637 W/ HCPCS: Performed by: STUDENT IN AN ORGANIZED HEALTH CARE EDUCATION/TRAINING PROGRAM

## 2020-10-09 PROCEDURE — D9220A PRA ANESTHESIA: ICD-10-PCS | Mod: CRNA,,, | Performed by: NURSE ANESTHETIST, CERTIFIED REGISTERED

## 2020-10-09 PROCEDURE — 37000008 HC ANESTHESIA 1ST 15 MINUTES: Performed by: INTERNAL MEDICINE

## 2020-10-09 PROCEDURE — U0002 COVID-19 LAB TEST NON-CDC: HCPCS | Performed by: PHYSICIAN ASSISTANT

## 2020-10-09 PROCEDURE — 93010 ELECTROCARDIOGRAM REPORT: CPT | Mod: ,,, | Performed by: INTERNAL MEDICINE

## 2020-10-09 PROCEDURE — 88305 TISSUE EXAM BY PATHOLOGIST: CPT | Mod: 26,,, | Performed by: PATHOLOGY

## 2020-10-09 PROCEDURE — D9220A PRA ANESTHESIA: Mod: CRNA,,, | Performed by: NURSE ANESTHETIST, CERTIFIED REGISTERED

## 2020-10-09 PROCEDURE — 99204 PR OFFICE/OUTPT VISIT, NEW, LEVL IV, 45-59 MIN: ICD-10-PCS | Mod: 25,,, | Performed by: INTERNAL MEDICINE

## 2020-10-09 PROCEDURE — 88342 IMHCHEM/IMCYTCHM 1ST ANTB: CPT | Mod: 26,,, | Performed by: PATHOLOGY

## 2020-10-09 PROCEDURE — 93005 ELECTROCARDIOGRAM TRACING: CPT

## 2020-10-09 PROCEDURE — 83690 ASSAY OF LIPASE: CPT

## 2020-10-09 PROCEDURE — 80053 COMPREHEN METABOLIC PANEL: CPT | Mod: 91

## 2020-10-09 PROCEDURE — 63600175 PHARM REV CODE 636 W HCPCS: Performed by: PHYSICIAN ASSISTANT

## 2020-10-09 RX ORDER — LIDOCAINE HYDROCHLORIDE 20 MG/ML
10 SOLUTION OROPHARYNGEAL ONCE
Status: COMPLETED | OUTPATIENT
Start: 2020-10-09 | End: 2020-10-09

## 2020-10-09 RX ORDER — PROPOFOL 10 MG/ML
VIAL (ML) INTRAVENOUS CONTINUOUS PRN
Status: DISCONTINUED | OUTPATIENT
Start: 2020-10-09 | End: 2020-10-09

## 2020-10-09 RX ORDER — SODIUM CHLORIDE 0.9 % (FLUSH) 0.9 %
10 SYRINGE (ML) INJECTION
Status: DISCONTINUED | OUTPATIENT
Start: 2020-10-09 | End: 2020-10-13 | Stop reason: HOSPADM

## 2020-10-09 RX ORDER — METHIMAZOLE 5 MG/1
5 TABLET ORAL DAILY
Status: DISCONTINUED | OUTPATIENT
Start: 2020-10-09 | End: 2020-10-13 | Stop reason: HOSPADM

## 2020-10-09 RX ORDER — PROPOFOL 10 MG/ML
VIAL (ML) INTRAVENOUS
Status: DISCONTINUED | OUTPATIENT
Start: 2020-10-09 | End: 2020-10-09

## 2020-10-09 RX ORDER — SODIUM CHLORIDE, SODIUM LACTATE, POTASSIUM CHLORIDE, CALCIUM CHLORIDE 600; 310; 30; 20 MG/100ML; MG/100ML; MG/100ML; MG/100ML
INJECTION, SOLUTION INTRAVENOUS CONTINUOUS
Status: DISCONTINUED | OUTPATIENT
Start: 2020-10-09 | End: 2020-10-10

## 2020-10-09 RX ORDER — SODIUM CHLORIDE 9 MG/ML
INJECTION, SOLUTION INTRAVENOUS CONTINUOUS PRN
Status: DISCONTINUED | OUTPATIENT
Start: 2020-10-09 | End: 2020-10-09

## 2020-10-09 RX ORDER — ONDANSETRON 2 MG/ML
4 INJECTION INTRAMUSCULAR; INTRAVENOUS
Status: COMPLETED | OUTPATIENT
Start: 2020-10-09 | End: 2020-10-09

## 2020-10-09 RX ORDER — LIDOCAINE HCL/PF 100 MG/5ML
SYRINGE (ML) INTRAVENOUS
Status: DISCONTINUED | OUTPATIENT
Start: 2020-10-09 | End: 2020-10-09

## 2020-10-09 RX ORDER — DICYCLOMINE HYDROCHLORIDE 10 MG/1
20 CAPSULE ORAL
Status: COMPLETED | OUTPATIENT
Start: 2020-10-09 | End: 2020-10-09

## 2020-10-09 RX ORDER — MAG HYDROX/ALUMINUM HYD/SIMETH 200-200-20
30 SUSPENSION, ORAL (FINAL DOSE FORM) ORAL
Status: COMPLETED | OUTPATIENT
Start: 2020-10-09 | End: 2020-10-09

## 2020-10-09 RX ORDER — MIDAZOLAM HYDROCHLORIDE 1 MG/ML
INJECTION, SOLUTION INTRAMUSCULAR; INTRAVENOUS
Status: DISCONTINUED | OUTPATIENT
Start: 2020-10-09 | End: 2020-10-09

## 2020-10-09 RX ORDER — OXYCODONE AND ACETAMINOPHEN 5; 325 MG/1; MG/1
1 TABLET ORAL EVERY 4 HOURS PRN
Status: DISCONTINUED | OUTPATIENT
Start: 2020-10-09 | End: 2020-10-13 | Stop reason: HOSPADM

## 2020-10-09 RX ORDER — TALC
6 POWDER (GRAM) TOPICAL NIGHTLY PRN
Status: DISCONTINUED | OUTPATIENT
Start: 2020-10-09 | End: 2020-10-13 | Stop reason: HOSPADM

## 2020-10-09 RX ORDER — ENOXAPARIN SODIUM 100 MG/ML
40 INJECTION SUBCUTANEOUS EVERY 24 HOURS
Status: DISCONTINUED | OUTPATIENT
Start: 2020-10-09 | End: 2020-10-13 | Stop reason: HOSPADM

## 2020-10-09 RX ORDER — PANTOPRAZOLE SODIUM 40 MG/1
40 TABLET, DELAYED RELEASE ORAL DAILY
Status: DISCONTINUED | OUTPATIENT
Start: 2020-10-09 | End: 2020-10-13 | Stop reason: HOSPADM

## 2020-10-09 RX ORDER — HYDROCHLOROTHIAZIDE 25 MG/1
25 TABLET ORAL 2 TIMES DAILY
Status: DISCONTINUED | OUTPATIENT
Start: 2020-10-09 | End: 2020-10-13 | Stop reason: HOSPADM

## 2020-10-09 RX ORDER — ALBUTEROL SULFATE 90 UG/1
2 AEROSOL, METERED RESPIRATORY (INHALATION) EVERY 4 HOURS PRN
Status: DISCONTINUED | OUTPATIENT
Start: 2020-10-09 | End: 2020-10-13 | Stop reason: HOSPADM

## 2020-10-09 RX ORDER — MORPHINE SULFATE 2 MG/ML
6 INJECTION, SOLUTION INTRAMUSCULAR; INTRAVENOUS
Status: COMPLETED | OUTPATIENT
Start: 2020-10-09 | End: 2020-10-09

## 2020-10-09 RX ORDER — SODIUM CHLORIDE 0.9 % (FLUSH) 0.9 %
10 SYRINGE (ML) INJECTION
Status: DISCONTINUED | OUTPATIENT
Start: 2020-10-09 | End: 2020-10-09 | Stop reason: HOSPADM

## 2020-10-09 RX ORDER — FLUTICASONE PROPIONATE 50 MCG
1 SPRAY, SUSPENSION (ML) NASAL DAILY
Status: DISCONTINUED | OUTPATIENT
Start: 2020-10-09 | End: 2020-10-13 | Stop reason: HOSPADM

## 2020-10-09 RX ORDER — ACETAMINOPHEN 325 MG/1
650 TABLET ORAL EVERY 8 HOURS PRN
Status: DISCONTINUED | OUTPATIENT
Start: 2020-10-09 | End: 2020-10-12

## 2020-10-09 RX ORDER — ONDANSETRON 8 MG/1
8 TABLET, ORALLY DISINTEGRATING ORAL EVERY 8 HOURS PRN
Status: DISCONTINUED | OUTPATIENT
Start: 2020-10-09 | End: 2020-10-13 | Stop reason: HOSPADM

## 2020-10-09 RX ADMIN — ONDANSETRON 4 MG: 2 INJECTION INTRAMUSCULAR; INTRAVENOUS at 02:10

## 2020-10-09 RX ADMIN — Medication 100 MG: at 04:10

## 2020-10-09 RX ADMIN — ACETAMINOPHEN 650 MG: 325 TABLET ORAL at 06:10

## 2020-10-09 RX ADMIN — PROPOFOL 100 MG: 10 INJECTION, EMULSION INTRAVENOUS at 04:10

## 2020-10-09 RX ADMIN — LIDOCAINE HYDROCHLORIDE 10 ML: 20 SOLUTION ORAL; TOPICAL at 07:10

## 2020-10-09 RX ADMIN — MORPHINE SULFATE 6 MG: 2 INJECTION, SOLUTION INTRAMUSCULAR; INTRAVENOUS at 02:10

## 2020-10-09 RX ADMIN — SODIUM CHLORIDE, SODIUM LACTATE, POTASSIUM CHLORIDE, AND CALCIUM CHLORIDE: .6; .31; .03; .02 INJECTION, SOLUTION INTRAVENOUS at 05:10

## 2020-10-09 RX ADMIN — SODIUM CHLORIDE 1000 ML: 0.9 INJECTION, SOLUTION INTRAVENOUS at 01:10

## 2020-10-09 RX ADMIN — HYDROCHLOROTHIAZIDE 25 MG: 25 TABLET ORAL at 09:10

## 2020-10-09 RX ADMIN — PROPOFOL 175 MCG/KG/MIN: 10 INJECTION, EMULSION INTRAVENOUS at 04:10

## 2020-10-09 RX ADMIN — SODIUM CHLORIDE: 9 INJECTION, SOLUTION INTRAVENOUS at 04:10

## 2020-10-09 RX ADMIN — POTASSIUM BICARBONATE 50 MEQ: 978 TABLET, EFFERVESCENT ORAL at 01:10

## 2020-10-09 RX ADMIN — ENOXAPARIN SODIUM 40 MG: 40 INJECTION SUBCUTANEOUS at 06:10

## 2020-10-09 RX ADMIN — FLUTICASONE PROPIONATE 50 MCG: 50 SPRAY, METERED NASAL at 09:10

## 2020-10-09 RX ADMIN — DICYCLOMINE HYDROCHLORIDE 20 MG: 10 CAPSULE ORAL at 12:10

## 2020-10-09 RX ADMIN — MIDAZOLAM HYDROCHLORIDE 2 MG: 1 INJECTION, SOLUTION INTRAMUSCULAR; INTRAVENOUS at 04:10

## 2020-10-09 RX ADMIN — OXYCODONE HYDROCHLORIDE AND ACETAMINOPHEN 1 TABLET: 5; 325 TABLET ORAL at 11:10

## 2020-10-09 RX ADMIN — PANTOPRAZOLE SODIUM 40 MG: 40 TABLET, DELAYED RELEASE ORAL at 04:10

## 2020-10-09 RX ADMIN — ALUMINUM HYDROXIDE, MAGNESIUM HYDROXIDE, AND SIMETHICONE 30 ML: 200; 200; 20 SUSPENSION ORAL at 03:10

## 2020-10-09 RX ADMIN — ONDANSETRON 4 MG: 2 INJECTION INTRAMUSCULAR; INTRAVENOUS at 12:10

## 2020-10-09 RX ADMIN — METHIMAZOLE 5 MG: 5 TABLET ORAL at 09:10

## 2020-10-09 NOTE — ED NOTES
I-STAT Chem-8+ Results:   Value Reference Range   Sodium 139 136-145 mmol/L   Potassium  2.8 3.5-5.1 mmol/L   Chloride 98  mmol/L   Ionized Calcium 1.12 1.06-1.42 mmol/L   CO2 (measured) 25 23-29 mmol/L   Glucose 137  mg/dL   BUN 10 6-30 mg/dL   Creatinine 0.6 0.5-1.4 mg/dL   Hematocrit 43 36-54%

## 2020-10-09 NOTE — CONSULTS
[facility]  Gastroenterology Service  Consult Note    Patient Name: Priscilla Lim  MRN: [id number]  Admission Date: 10/8/2020  Hospital Length of Stay: 0 days  Code Status: Full Code   Attending Provider: Al Quijano MD   Consulting Provider: Guicho Allen MD  Primary Care Physician: Yandel Batista MD  Principal Problem:Abdominal pain    Inpatient consult to Gastroenterology  Consult performed by: Guicho Allen MD  Consult ordered by: Korina Cruz MD        Subjective:     HPI: Priscilla Lim is a 65 y.o. female with history of HTN and Graves disease who presents with epigastric pain. GI consulted for possible PUD.    Patient reports one month of epigastric pain, initially intermittent, but progressively getting worse. Worse with food. Yesterday, it was much more severe and she vomited once NBNB. Does report some reflux worse with caffeine and spicy foods. Takes Mylanta with some relief. Does not take a PPI. Takes ibuprofen about once a month. No other NSAIDs or blood thinners. No weight loss. She had an EGD many years ago [place]. Per primary team, she had a history of H pylori, but patient and her son are not aware of this. No prior abdominal surgeries.    Here, vitals normal. Hgb normal, MCV normal. CMP including LFTs normal. Lipase normal. U/S with cholelithiasis, no acute cholecystitis, CBD 4.4mm, no biliary dilatation.        Past Medical History:   Diagnosis Date    Grave's disease     thyroid nodule    Hypertension     Mixed hyperlipidemia 8/23/2013       No past surgical history on file.    Family History   Problem Relation Age of Onset    Heart disease Mother     Hypertension Sister     Diabetes Sister     Hypertension Brother     No Known Problems Daughter     No Known Problems Son     No Known Problems Son     Hypertension Sister     Hypertension Sister     Diabetes Sister     Hypertension Sister     Diabetes Sister      Hypertension Sister     Diabetes Sister     Hypertension Brother     Hypertension Brother     Diabetes Brother     Hypertension Brother     Hypertension Brother     Hypertension Brother     Breast cancer Neg Hx     Colon cancer Neg Hx     Ovarian cancer Neg Hx        Social History     Socioeconomic History    Marital status:      Spouse name: Not on file    Number of children: Not on file    Years of education: Not on file    Highest education level: Not on file   Occupational History    Not on file   Social Needs    Financial resource strain: Not on file    Food insecurity     Worry: Not on file     Inability: Not on file    Transportation needs     Medical: Not on file     Non-medical: Not on file   Tobacco Use    Smoking status: Former Smoker     Packs/day: 0.10     Years: 2.00     Pack years: 0.20     Quit date: 7/17/2010     Years since quitting: 10.2    Smokeless tobacco: Former User    Tobacco comment: ex-social smoker   Substance and Sexual Activity    Alcohol use: Yes     Comment: socially    Drug use: No    Sexual activity: Yes     Partners: Male     Birth control/protection: Post-menopausal   Lifestyle    Physical activity     Days per week: Not on file     Minutes per session: Not on file    Stress: Not on file   Relationships    Social connections     Talks on phone: Not on file     Gets together: Not on file     Attends Hindu service: Not on file     Active member of club or organization: Not on file     Attends meetings of clubs or organizations: Not on file     Relationship status: Not on file   Other Topics Concern    Not on file   Social History Narrative    Not on file       No current facility-administered medications on file prior to encounter.      Current Outpatient Medications on File Prior to Encounter   Medication Sig Dispense Refill    albuterol (PROVENTIL/VENTOLIN HFA) 90 mcg/actuation inhaler Inhale 2 puffs into the lungs every 4 (four) hours as  needed for Wheezing or Shortness of Breath. Rescue 18 g 0    clotrimazole (LOTRIMIN) 1 % cream       fluticasone propionate (FLONASE) 50 mcg/actuation nasal spray 1 spray (50 mcg total) by Each Nostril route once daily. 18.2 mL 0    gabapentin (NEURONTIN) 100 MG capsule Take 1 capsule (100 mg total) by mouth every evening. 30 capsule 2    hydroCHLOROthiazide (HYDRODIURIL) 25 MG tablet TAKE TWO TABLETS BY MOUTH EVERY  tablet 0    hydroCHLOROthiazide (HYDRODIURIL) 25 MG tablet Take 1 tablet (25 mg total) by mouth 2 (two) times daily. 60 tablet 2    LORazepam (ATIVAN) 0.5 MG tablet Ata 1 tableta michael o dos veces por jorge dusty necestie para ansiedad. 30 tablet 2    methIMAzole (TAPAZOLE) 5 MG Tab Take 1.5 tablets daily (7.5 mg) 120 tablet 3    MULTIVIT-IRON-MIN-FOLIC ACID 3,500-18-0.4 UNIT-MG-MG ORAL CHEW Take by mouth.      traMADol (ULTRAM) 50 mg tablet Take 1 tablet (50 mg total) by mouth every 12 (twelve) hours as needed for Pain. (Patient not taking: Reported on 10/24/2019) 60 tablet 1       Review of patient's allergies indicates:  No Known Allergies    Review of Systems:   Constitutional: no fever, chills or change in weight   Eyes: no visual changes   ENT: no sore throat or dysphagia  Respiratory: no cough or shortness of breath   Cardiovascular: no chest pain or palpitations   Gastrointestinal: as per HPI  Hematologic/Lymphatic: no easy bruising or lymphadenopathy   Musculoskeletal: no arthralgias or myalgias   Neurological: no change in mental status  Behavioral/Psych: no change in mood    Objective:     Vitals:    10/09/20 1136   BP:    Pulse:    Resp: 16   Temp:        General: Alert and Oriented, no distress  HEENT: Normocephalic, Atraumatic. No scleral icterus.  Resp: Good air entry bilaterally, no adventitious sounds  Cardiac: S1 and S2 normal  Abdomen: Normoactive bowel sounds. Non-distended. Normal tympany. Soft. Non-tender. No peritoneal signs.  Extremities: No peripheral edema.    Neurologic: No gross neurological Deficits  Psych: Calm, cooperative. Normal mood and affect.    Significant Labs:  Recent Labs   Lab 10/09/20  0033 10/09/20  0420   HGB 13.6 13.5       Lab Results   Component Value Date    WBC 10.10 10/09/2020    HGB 13.5 10/09/2020    HCT 41.5 10/09/2020    MCV 87 10/09/2020     10/09/2020       Lab Results   Component Value Date     10/09/2020    K 3.4 (L) 10/09/2020     10/09/2020    CO2 27 10/09/2020    BUN 8 10/09/2020    CREATININE 0.6 10/09/2020    CALCIUM 8.5 (L) 10/09/2020    ANIONGAP 12 10/09/2020    ESTGFRAFRICA >60.0 10/09/2020    EGFRNONAA >60.0 10/09/2020       Lab Results   Component Value Date    ALT 20 10/09/2020    AST 22 10/09/2020    ALKPHOS 67 10/09/2020    BILITOT 0.4 10/09/2020       Lab Results   Component Value Date    INR 1.3 (H) 09/22/2010    INR 1.0 09/21/2010    INR 0.9 09/20/2010       Significant Imaging:  Reviewed pertinent radiology findings.       Assessment/Plan:     Priscilla Lim is a 65 y.o. female with history of HTN and Graves disease who presents with epigastric pain. GI consulted for possible PUD.    Labs unremarkable including lipase. U/S with cholelithiasis without complication. ?history of H pylori. Suspect PUD, gastritis, esophagitis, etc.    Problem List:  1. Epigastric pain    Plan:  1. EGD today  2. Keep NPO    Thank you for involving us in the care of Priscilla Lim. Please call with any additional questions, concerns or changes in the patient's clinical status.    Guicho Allen MD  Gastroenterology Fellow PGY IV   [facility] [address]

## 2020-10-09 NOTE — ANESTHESIA PREPROCEDURE EVALUATION
Pre-operative evaluation for EGD (ESOPHAGOGASTRODUODENOSCOPY) (N/A)    Patient Active Problem List   Diagnosis    Primary hyperthyroidism    HTN (hypertension)    Multiple thyroid nodules    Mixed hyperlipidemia    Exophthalmos    Left eye pain    Degeneration of lumbar or lumbosacral intervertebral disc    Venous insufficiency of both lower extremities    Graves' orbitopathy    Abdominal pain        PMH:    Past Surgical History:   Procedure Laterality Date    COLONOSCOPY      ESOPHAGOGASTRODUODENOSCOPY           Vital Signs Range (Last 24H):  Temp:  [36.7 °C (98.1 °F)-37 °C (98.6 °F)]   Pulse:  [58-79]   Resp:  [16-18]   BP: (117-150)/(68-95)   SpO2:  [94 %-99 %]       CBC:     Recent Labs   Lab 10/09/20  0033 10/09/20  0040 10/09/20  0420   WBC 9.32  --  10.10   HCT 42.0 43 41.5     --  218       CMP:   Recent Labs   Lab 10/09/20  0033 10/09/20  0420   K 2.9* 3.4*   * 146*       INR:  No results for input(s): PT, INR, PROTIME, APTT in the last 720 hours.      Anesthesia Evaluation    I have reviewed the Patient Summary Reports.    I have reviewed the Nursing Notes. I have reviewed the NPO Status.   I have reviewed the Medications.     Review of Systems  Anesthesia Hx:  Denies Family Hx of Anesthesia complications.   Denies Personal Hx of Anesthesia complications.   Cardiovascular:   Exercise tolerance: good Hypertension Denies MI.   Denies CABG/stent.       Functional Capacity 4 METS    Pulmonary:   Denies COPD.  Denies Asthma.  Denies Sleep Apnea.    Renal/:   Denies Chronic Renal Disease.     Musculoskeletal:   Arthritis     Neurological:   Denies TIA. Denies CVA. Denies Seizures.    Endocrine:   Hyperthyroidism        Physical Exam   Airway/Jaw/Neck:  Airway Findings: Mouth Opening: Normal Tongue: Normal  General Airway Assessment: Adult  Mallampati: II  TM Distance: Normal, at least 6 cm  Jaw/Neck Findings:  Neck ROM: Normal ROM      Dental:  Dental Findings:     Chest/Lungs:  Chest/Lungs Findings: Clear to auscultation     Heart/Vascular:  Heart Findings: Rate: Normal  Rhythm: Regular Rhythm  Sounds: Normal  Heart murmur: negative            Anesthesia Plan  Type of Anesthesia, risks & benefits discussed:  Anesthesia Type:  general, MAC  Patient's Preference:   Intra-op Monitoring Plan: standard ASA monitors  Intra-op Monitoring Plan Comments:   Post Op Pain Control Plan: multimodal analgesia, IV/PO Opioids PRN and per primary service following discharge from PACU  Post Op Pain Control Plan Comments:   Induction:   IV  Beta Blocker:         Informed Consent: Patient understands risks and agrees with Anesthesia plan.  Questions answered. Anesthesia consent signed with patient.  ASA Score: 3     Day of Surgery Review of History & Physical:    H&P update referred to the provider.         Ready For Surgery From Anesthesia Perspective.

## 2020-10-09 NOTE — ASSESSMENT & PLAN NOTE
Patient is 64 yo F with biliary colic vs symptomatic peptic ulcer disease    Admit to general surgery  NPO  IVF  GI cocktail  Protonix  Home meds  Lovenox    Will discuss with staff

## 2020-10-09 NOTE — ED NOTES
Pt ambulated to restroom. No stool at this time. Rates headache as 6/10. Will given PRN oxycodone. Pt updated on POC.

## 2020-10-09 NOTE — ED PROVIDER NOTES
Encounter Date: 10/8/2020       History     Chief Complaint   Patient presents with    Abdominal Pain     Patient reports that she has been having abdominal pain and 2 episodes of vomiting today.     Emesis     Patient is a 65 year old female with PMHX of HTN, HLD, and graves disease. She presents to the ED for abdominal pain. She reports having epigastric abdominal pain for approximately one month. Describes pain as intermittent and heaviness. Rates pain 9/10. Denies OTC medication use. Reports pain worse with eating. Reports associated two episodes of emesis. Denies hx of anticoagulation. Reports abdominal surgical hx of tummy tuck. Last BM today. + flatus. She denies fever,chills, sob, chest pain, dysuria, diarrhea, or constipation. She is a former smoker and reports alcohol use.    The history is provided by the patient and medical records. The history is limited by a language barrier. A  was used (patient is Swedish speaking. daughter translates).     Review of patient's allergies indicates:  No Known Allergies  Past Medical History:   Diagnosis Date    Grave's disease     thyroid nodule    Hypertension     Mixed hyperlipidemia 8/23/2013     No past surgical history on file.  Family History   Problem Relation Age of Onset    Heart disease Mother     Hypertension Sister     Diabetes Sister     Hypertension Brother     No Known Problems Daughter     No Known Problems Son     No Known Problems Son     Hypertension Sister     Hypertension Sister     Diabetes Sister     Hypertension Sister     Diabetes Sister     Hypertension Sister     Diabetes Sister     Hypertension Brother     Hypertension Brother     Diabetes Brother     Hypertension Brother     Hypertension Brother     Hypertension Brother     Breast cancer Neg Hx     Colon cancer Neg Hx     Ovarian cancer Neg Hx      Social History     Tobacco Use    Smoking status: Former Smoker     Packs/day: 0.10     Years:  2.00     Pack years: 0.20     Quit date: 7/17/2010     Years since quitting: 10.2    Smokeless tobacco: Former User    Tobacco comment: ex-social smoker   Substance Use Topics    Alcohol use: Yes     Comment: socially    Drug use: No     Review of Systems   Constitutional: Negative for fever.   HENT: Negative for sore throat.    Respiratory: Negative for shortness of breath.    Cardiovascular: Negative for chest pain.   Gastrointestinal: Positive for abdominal pain, nausea and vomiting.   Genitourinary: Negative for dysuria.   Musculoskeletal: Negative for back pain.   Skin: Negative for rash.   Neurological: Negative for weakness.   Hematological: Does not bruise/bleed easily.       Physical Exam     Initial Vitals [10/08/20 2346]   BP Pulse Resp Temp SpO2   (!) 150/86 79 18 98.2 °F (36.8 °C) 99 %      MAP       --         Physical Exam    Vitals reviewed.  Constitutional: She appears well-developed and well-nourished. She is Obese . No distress.   HENT:   Head: Normocephalic.   Eyes: Conjunctivae are normal.   Neck: Normal range of motion.   Cardiovascular: Normal rate and regular rhythm.   No murmur heard.  Pulmonary/Chest: Breath sounds normal. No respiratory distress. She has no wheezes. She has no rales.   Abdominal: Soft. Bowel sounds are normal. She exhibits no distension. There is abdominal tenderness in the right upper quadrant and epigastric area.   Musculoskeletal: Normal range of motion.   Neurological: She is alert and oriented to person, place, and time.   Skin: Skin is warm and dry.         ED Course   Procedures  Labs Reviewed   CBC W/ AUTO DIFFERENTIAL - Abnormal; Notable for the following components:       Result Value    Gran% 75.4 (*)     Lymph% 17.1 (*)     All other components within normal limits   COMPREHENSIVE METABOLIC PANEL - Abnormal; Notable for the following components:    Potassium 2.9 (*)     Glucose 141 (*)     All other components within normal limits    Narrative:     ADD ON  MAGNESIUM PER LEANN BOUCHER RN/ORDER# [id number] @ 00:55AM   10/9/2020   URINALYSIS, REFLEX TO URINE CULTURE - Abnormal; Notable for the following components:    Appearance, UA Cloudy (*)     Leukocytes, UA Trace (*)     All other components within normal limits    Narrative:     Specimen Source->Urine   URINALYSIS MICROSCOPIC - Abnormal; Notable for the following components:    Amorphous, UA Many (*)     All other components within normal limits    Narrative:     Specimen Source->Urine   CBC W/ AUTO DIFFERENTIAL - Abnormal; Notable for the following components:    Gran # (ANC) 8.5 (*)     Gran% 84.1 (*)     Lymph% 10.7 (*)     All other components within normal limits   COMPREHENSIVE METABOLIC PANEL - Abnormal; Notable for the following components:    Potassium 3.4 (*)     Glucose 146 (*)     Calcium 8.5 (*)     All other components within normal limits   ISTAT PROCEDURE - Abnormal; Notable for the following components:    POC Glucose 137 (*)     POC Potassium 2.8 (*)     All other components within normal limits   LIPASE    Narrative:     ADD ON MAGNESIUM PER LEANN BOUCHER RN/ORDER# [id number] @ 00:55AM   10/9/2020   MAGNESIUM   MAGNESIUM    Narrative:     ADD ON MAGNESIUM PER LEANN BOUCHER RN/ORDER# [id number] @ 00:55AM   10/9/2020   MAGNESIUM   PHOSPHORUS   SARS-COV-2 RDRP GENE   ISTAT CHEM8     EKG Readings: (Independently Interpreted)   Normal sinus rhythm at rate of 67 bpm. No STEMI.       Imaging Results          US Abdomen Limited (Final result)  Result time 10/09/20 01:44:27    Final result by Kevin Mims MD (10/09/20 01:44:27)                 Impression:      Cholelithiasis is present, and there is mild to moderate gallbladder distention, however there is no additional sonographic evidence for acute cholecystitis, note is made there is a negative sonographic Irwin sign however the technologist indicates the patient had received pain medication and therefore clinical correlation is  otherwise needed.      Electronically signed by: Kevin Mims  Date:    10/09/2020  Time:    01:44             Narrative:    EXAMINATION:  US ABDOMEN LIMITED    CLINICAL HISTORY:  Abdominal Pain - Gallbladder;    TECHNIQUE:  Limited ultrasound of the right upper quadrant of the abdomen (including pancreas, gallbladder, common bile duct) Was performed.    COMPARISON:  None.    FINDINGS:  There is limited visualization of the pancreas.  Visualized aspects of the pancreas appear unremarkable however not well evaluated on this exam.  There is mild to moderate gallbladder distention.  Cholelithiasis and suspected sludge within the gallbladder lumen noted.  There is no abnormal gallbladder wall thickening and there is no evidence for pericholecystic fluid on the submitted imaging.  The technologist indicates a negative sonographic Irwin sign however indicates the patient did receive pain medication and therefore the significance of a negative sonographic Irwin sign is limited.  The common duct measures approximately 4.4 mm, there is no evidence for abnormal intrahepatic or extrahepatic biliary dilatation.  Evaluation of the liver is limited however there is no sonographic evidence for focal hepatic mass lesion.  Limited imaging of the right kidney demonstrates no evidence for hydronephrosis.                                 Medical Decision Making:   History:   Old Medical Records: I decided to obtain old medical records.  Clinical Tests:   Lab Tests: Ordered and Reviewed  Radiological Study: Ordered and Reviewed  Medical Tests: Ordered and Reviewed  Other:   I have discussed this case with another health care provider.       <> Summary of the Discussion: Case discussed with general surgery for evaluation       APC / Resident Notes:   Patient is a 65 year old female presents to the ED for emergent evaluation of epigastric abdominal pain.     Will order labs and imaging. Will order IVF, antiemetic, and bentyl for  symptomatic relief. Will continue to monitor.     Differential diagnoses include, but are not limited to: acute cholecystitis, choledocholithiasis, pancreatitis, peptic ulcer disease, cardiac arrhythmia, or electrolyte imbalance.     COVID negative. No leukocytosis. Hemodynamically stable. Hypokalemia 2.9. will replete PO while in ED. UA unremarkable for infectious process. US abdomen found to have Cholelithiasis with mild to moderate gallbladder distention. General surgery consulted.     2:17 AM  Case discussed general surgery for evaluation. Will continue to monitor.    4:08 AM  Appreciate consult. Dr. Christopher Lucero with general surgery states he will admit patient to their service for observation.     I have discussed and reviewed with my supervising physician.        Clinical Impression:       ICD-10-CM ICD-9-CM   1. Calculus of gallbladder without cholecystitis without obstruction  K80.20 574.20   2. Hypokalemia  E87.6 276.8                      Disposition:   Disposition: Placed in Observation  Condition: Fair     ED Disposition Condition    Observation                             Evelia Julio PA-C  10/09/20 0737

## 2020-10-09 NOTE — [employer] PATIENT INSTRUCTIONS
Discharge Summary/Instructions after an Endoscopic Procedure  Patient Name: Priscilla Lim  Patient MRN: [id number]  Patient YOB: 1955 Friday, October 9, 2020  Nader Barnett MD  RESTRICTIONS:  During your procedure today, you received medications for sedation.  These   medications may affect your judgment, balance and coordination.  Therefore,   for 24 hours, you have the following restrictions:   - DO NOT drive a car, operate machinery, make legal/financial decisions,   sign important papers or drink alcohol.    ACTIVITY:  Today: no heavy lifting, straining or running due to procedural   sedation/anesthesia.  The following day: return to full activity including work.  DIET:  Eat and drink normally unless instructed otherwise.     TREATMENT FOR COMMON SIDE EFFECTS:  - Mild abdominal pain, nausea, belching, bloating or excessive gas:  rest,   eat lightly and use a heating pad.  - Sore Throat: treat with throat lozenges and/or gargle with warm salt   water.  - Because air was used during the procedure, expelling large amounts of air   from your rectum or belching is normal.  - If a bowel prep was taken, you may not have a bowel movement for 1-3 days.    This is normal.  SYMPTOMS TO WATCH FOR AND REPORT TO YOUR PHYSICIAN:  1. Abdominal pain or bloating, other than gas cramps.  2. Chest pain.  3. Back pain.  4. Signs of infection such as: chills or fever occurring within 24 hours   after the procedure.  5. Rectal bleeding, which would show as bright red, maroon, or black stools.   (A tablespoon of blood from the rectum is not serious, especially if   hemorrhoids are present.)  6. Vomiting.  7. Weakness or dizziness.  GO DIRECTLY TO THE NEAREST EMERGENCY ROOM IF YOU HAVE ANY OF THE FOLLOWING:      Difficulty breathing              Chills and/or fever over 101 F   Persistent vomiting and/or vomiting blood   Severe abdominal pain   Severe chest pain   Black, tarry stools   Bleeding- more than one  tablespoon   Any other symptom or condition that you feel may need urgent attention  Your doctor recommends these additional instructions:  If any biopsies were taken, your doctors clinic will contact you in 1 to 2   weeks with any results.  - Return patient to hospital hi for ongoing care.   - Await pathology results.   - Recommend primary team get consider CT scan (computed tomography) of the   abdomen with contrast.   - Telephone endoscopist for pathology results in 2 weeks.   - The findings and recommendations were discussed with the patient.  For questions, problems or results please call your physician - Nader Barnett MD at Work:  [phone].  [facility], EMERGENCY ROOM PHONE NUMBER: [phone]  IF A COMPLICATION OR EMERGENCY SITUATION ARISES AND YOU ARE UNABLE TO REACH   YOUR PHYSICIAN - GO DIRECTLY TO THE EMERGENCY ROOM.  Nader Barnett MD  10/9/2020 5:29:33 PM  This report has been verified and signed electronically.  [employer]

## 2020-10-09 NOTE — TRANSFER OF CARE
"Anesthesia Transfer of Care Note    Patient: Priscilla Lim    Procedure(s) Performed: Procedure(s) (LRB):  EGD (ESOPHAGOGASTRODUODENOSCOPY) (N/A)    Patient location: PACU    Anesthesia Type: general    Transport from OR: Transported from OR on room air with adequate spontaneous ventilation    Post pain: adequate analgesia    Post assessment: no apparent anesthetic complications    Post vital signs: stable    Level of consciousness: awake and alert    Nausea/Vomiting: no nausea/vomiting    Complications: none    Transfer of care protocol was followed      Last vitals:   Visit Vitals  /84 (BP Location: Left arm, Patient Position: Sitting)   Pulse 60   Temp 36.7 °C (98.1 °F) (Temporal)   Resp 17   Ht 5' 3.78" (1.62 m)   Wt 87.9 kg (193 lb 12.6 oz)   LMP  (LMP Unknown)   SpO2 98%   Breastfeeding No   BMI 33.49 kg/m²     "

## 2020-10-09 NOTE — PROGRESS NOTES
Patient oriented to new room. VSS. IV fluids infusing. NPO Pain controlled. Son at bedside. SCDs on. Will admit pt and continue to monitor.

## 2020-10-09 NOTE — NURSING TRANSFER
Nursing Transfer Note      10/9/2020     Transfer to Endo    Transfer via stretcher    Transfer with son    Transported by escort    Medicines sent: none    Notified: pt and son

## 2020-10-09 NOTE — HPI
"Patient is 64 yo F with history of HTN and Graves who presents with 1 month of epigastric and RUQ pain that worsens with meals. She reports first noticing this pain approx 1 month ago, and since then it has been regularly triggered with meals. She describes the pain as a sharp heaviness that starts in her epigastric region and radiates to her RUQ. The pain lasts about 30 minutes and then resolves. She has thrown up twice now in the last day. She also notes early satiety. She denies any fevers, chills, or history of these pain episodes in past.  Upon arrival to ED her labs were unremarkable, a RUQ US showed cholelithiasis with no signs of cholecystitis and General Surgery was consulted.    She does note previous upper endoscopy in the past, but that was in her [place] long [place]. Doesn't recall any mention of peptic ulcers. She also has a positive H. Pylori IgG in 2008, but it isn't clear from the [facility] documents wether she ever received triple therapy.    She denies any intra-abdominal surgical hx, does have hx of "tummy tuck" in past  Denies hx of MI, CVA, anticoagulation    "

## 2020-10-09 NOTE — PROGRESS NOTES
Vital signs stable. Afebrile. Alert, oriented and following commands. Denies pain/nausea. Tolerating sips of water. POC reviewed and understanding verbalized.

## 2020-10-09 NOTE — CONSULTS
"[facility]  General Surgery  Consult Note    Patient Name: Priscilla Lim  MRN: [id number]  Code Status: Full Code  Admission Date: 10/8/2020  Hospital Length of Stay: 0 days  Attending Physician: Al Quijano MD  Primary Care Provider: Yandel Batista MD    Patient information was obtained from patient, relative(s) and ER records.     Inpatient consult to General Surgery  Consult performed by: Christopher Lucero MD  Consult ordered by: Christopher Lucero MD  Reason for consult: Cholelithiasis        Subjective:     Principal Problem: Abdominal pain    History of Present Illness: Patient is 64 yo F with history of HTN and Graves who presents with 1 month of epigastric and RUQ pain that worsens with meals. She reports first noticing this pain approx 1 month ago, and since then it has been regularly triggered with meals. She describes the pain as a sharp heaviness that starts in her epigastric region and radiates to her RUQ. The pain lasts about 30 minutes and then resolves. She has thrown up twice now in the last day. She also notes early satiety. She denies any fevers, chills, or history of these pain episodes in past.  Upon arrival to ED her labs were unremarkable, a RUQ US showed cholelithiasis with no signs of cholecystitis and General Surgery was consulted.    She does note previous upper endoscopy in the past, but that was in her [place] long [place]. Doesn't recall any mention of peptic ulcers. She also has a positive H. Pylori IgG in 2008, but it isn't clear from the [facility] documents wether she ever received triple therapy.    She denies any intra-abdominal surgical hx, does have hx of "tummy tuck" in past  Denies hx of MI, CVA, anticoagulation      No current facility-administered medications on file prior to encounter.      Current Outpatient Medications on File Prior to Encounter   Medication Sig    albuterol (PROVENTIL/VENTOLIN HFA) 90 mcg/actuation inhaler Inhale 2 puffs " into the lungs every 4 (four) hours as needed for Wheezing or Shortness of Breath. Rescue    clotrimazole (LOTRIMIN) 1 % cream     fluticasone propionate (FLONASE) 50 mcg/actuation nasal spray 1 spray (50 mcg total) by Each Nostril route once daily.    gabapentin (NEURONTIN) 100 MG capsule Take 1 capsule (100 mg total) by mouth every evening.    hydroCHLOROthiazide (HYDRODIURIL) 25 MG tablet TAKE TWO TABLETS BY MOUTH EVERY DAY    hydroCHLOROthiazide (HYDRODIURIL) 25 MG tablet Take 1 tablet (25 mg total) by mouth 2 (two) times daily.    LORazepam (ATIVAN) 0.5 MG tablet Ata 1 tableta michael o dos veces por jorge dusty necestie para ansiedad.    methIMAzole (TAPAZOLE) 5 MG Tab Take 1.5 tablets daily (7.5 mg)    MULTIVIT-IRON-MIN-FOLIC ACID 3,500-18-0.4 UNIT-MG-MG ORAL CHEW Take by mouth.    traMADol (ULTRAM) 50 mg tablet Take 1 tablet (50 mg total) by mouth every 12 (twelve) hours as needed for Pain. (Patient not taking: Reported on 10/24/2019)       Review of patient's allergies indicates:  No Known Allergies    Past Medical History:   Diagnosis Date    Grave's disease     thyroid nodule    Hypertension     Mixed hyperlipidemia 8/23/2013     No past surgical history on file.  Family History     Problem Relation (Age of Onset)    Diabetes Sister, Sister, Sister, Sister, Brother    Heart disease Mother    Hypertension Sister, Brother, Sister, Sister, Sister, Sister, Brother, Brother, Brother, Brother, Brother    No Known Problems Daughter, Son, Son        Tobacco Use    Smoking status: Former Smoker     Packs/day: 0.10     Years: 2.00     Pack years: 0.20     Quit date: 7/17/2010     Years since quitting: 10.2    Smokeless tobacco: Former User    Tobacco comment: ex-social smoker   Substance and Sexual Activity    Alcohol use: Yes     Comment: socially    Drug use: No    Sexual activity: Yes     Partners: Male     Birth control/protection: Post-menopausal     Review of Systems   Constitutional: Negative  for chills and fever.   HENT: Negative for sore throat and trouble swallowing.    Eyes: Negative for discharge and redness.   Respiratory: Negative for chest tightness and shortness of breath.    Cardiovascular: Negative for chest pain.   Gastrointestinal: Positive for abdominal pain and vomiting. Negative for abdominal distention, constipation, diarrhea and nausea.   Genitourinary: Negative for dysuria.   Musculoskeletal: Negative for back pain and neck pain.   Skin: Negative for color change.   Allergic/Immunologic: Negative for immunocompromised state.   Neurological: Negative for dizziness and headaches.   Hematological: Negative for adenopathy.     Objective:     Vital Signs (Most Recent):  Temp: 98.2 °F (36.8 °C) (10/08/20 2346)  Pulse: 68 (10/09/20 0309)  Resp: 18 (10/09/20 0309)  BP: 123/75 (10/09/20 0309)  SpO2: 97 % (10/09/20 0309) Vital Signs (24h Range):  Temp:  [98.2 °F (36.8 °C)] 98.2 °F (36.8 °C)  Pulse:  [68-79] 68  Resp:  [18] 18  SpO2:  [97 %-99 %] 97 %  BP: (123-150)/(75-86) 123/75     Weight: 83.9 kg (185 lb)  Body mass index is 30.79 kg/m².    Physical Exam  Vitals signs and nursing note reviewed.   Constitutional:       General: She is not in acute distress.     Appearance: She is well-developed. She is not diaphoretic.   HENT:      Head: Normocephalic and atraumatic.   Eyes:      Pupils: Pupils are equal, round, and reactive to light.   Neck:      Musculoskeletal: Normal range of motion and neck supple.   Cardiovascular:      Rate and Rhythm: Normal rate and regular rhythm.   Pulmonary:      Effort: Pulmonary effort is normal. No respiratory distress.   Abdominal:      General: There is no distension.      Palpations: Abdomen is soft.      Tenderness: There is abdominal tenderness. There is no guarding.      Comments: Abd soft, non distended  Mild TTP in epigastric region and RUQ   Musculoskeletal: Normal range of motion.   Skin:     General: Skin is warm and dry.   Neurological:      Mental  Status: She is alert and oriented to person, place, and time.   Psychiatric:         Mood and Affect: Mood normal.         Behavior: Behavior normal.         Thought Content: Thought content normal.         Judgment: Judgment normal.         Significant Labs:  CBC:   Recent Labs   Lab 10/09/20  0420   WBC 10.10   RBC 4.75   HGB 13.5   HCT 41.5      MCV 87   MCH 28.4   MCHC 32.5     CMP:   Recent Labs   Lab 10/09/20  0033   *   CALCIUM 9.0   ALBUMIN 4.0   PROT 7.0      K 2.9*   CO2 25      BUN 12   CREATININE 0.7   ALKPHOS 69   ALT 17   AST 14   BILITOT 0.3       Significant Diagnostics:  I have reviewed all pertinent imaging results/findings within the past 24 hours.   US showed cholelithiasis with no signs of acute cholecystitis, CBD was 4.4 mm    Assessment/Plan:     * Abdominal pain  Patient is 66 yo F with biliary colic vs symptomatic peptic ulcer disease    Admit to general surgery  NPO  IVF  GI cocktail  Protonix  Home meds  Lovenox    Will discuss with staff      VTE Risk Mitigation (From admission, onward)         Ordered     enoxaparin injection 40 mg  Every 24 hours      10/09/20 0413     Place MARTINE hose  Until discontinued      10/09/20 0413     IP VTE HIGH RISK PATIENT  Once      10/09/20 0413     Place sequential compression device  Until discontinued      10/09/20 0413                Thank you for your consult. I will follow-up with patient. Please contact us if you have any additional questions.    Christopher Lucero MD  General Surgery  [facility]

## 2020-10-09 NOTE — SUBJECTIVE & OBJECTIVE
No current facility-administered medications on file prior to encounter.      Current Outpatient Medications on File Prior to Encounter   Medication Sig    albuterol (PROVENTIL/VENTOLIN HFA) 90 mcg/actuation inhaler Inhale 2 puffs into the lungs every 4 (four) hours as needed for Wheezing or Shortness of Breath. Rescue    clotrimazole (LOTRIMIN) 1 % cream     fluticasone propionate (FLONASE) 50 mcg/actuation nasal spray 1 spray (50 mcg total) by Each Nostril route once daily.    gabapentin (NEURONTIN) 100 MG capsule Take 1 capsule (100 mg total) by mouth every evening.    hydroCHLOROthiazide (HYDRODIURIL) 25 MG tablet TAKE TWO TABLETS BY MOUTH EVERY DAY    hydroCHLOROthiazide (HYDRODIURIL) 25 MG tablet Take 1 tablet (25 mg total) by mouth 2 (two) times daily.    LORazepam (ATIVAN) 0.5 MG tablet Ata 1 tableta michael o dos veces por jorge dusty necestie para ansiedad.    methIMAzole (TAPAZOLE) 5 MG Tab Take 1.5 tablets daily (7.5 mg)    MULTIVIT-IRON-MIN-FOLIC ACID 3,500-18-0.4 UNIT-MG-MG ORAL CHEW Take by mouth.    traMADol (ULTRAM) 50 mg tablet Take 1 tablet (50 mg total) by mouth every 12 (twelve) hours as needed for Pain. (Patient not taking: Reported on 10/24/2019)       Review of patient's allergies indicates:  No Known Allergies    Past Medical History:   Diagnosis Date    Grave's disease     thyroid nodule    Hypertension     Mixed hyperlipidemia 8/23/2013     No past surgical history on file.  Family History     Problem Relation (Age of Onset)    Diabetes Sister, Sister, Sister, Sister, Brother    Heart disease Mother    Hypertension Sister, Brother, Sister, Sister, Sister, Sister, Brother, Brother, Brother, Brother, Brother    No Known Problems Daughter, Son, Son        Tobacco Use    Smoking status: Former Smoker     Packs/day: 0.10     Years: 2.00     Pack years: 0.20     Quit date: 7/17/2010     Years since quitting: 10.2    Smokeless tobacco: Former User    Tobacco comment: ex-social smoker    Substance and Sexual Activity    Alcohol use: Yes     Comment: socially    Drug use: No    Sexual activity: Yes     Partners: Male     Birth control/protection: Post-menopausal     Review of Systems   Constitutional: Negative for chills and fever.   HENT: Negative for sore throat and trouble swallowing.    Eyes: Negative for discharge and redness.   Respiratory: Negative for chest tightness and shortness of breath.    Cardiovascular: Negative for chest pain.   Gastrointestinal: Positive for abdominal pain and vomiting. Negative for abdominal distention, constipation, diarrhea and nausea.   Genitourinary: Negative for dysuria.   Musculoskeletal: Negative for back pain and neck pain.   Skin: Negative for color change.   Allergic/Immunologic: Negative for immunocompromised state.   Neurological: Negative for dizziness and headaches.   Hematological: Negative for adenopathy.     Objective:     Vital Signs (Most Recent):  Temp: 98.2 °F (36.8 °C) (10/08/20 2346)  Pulse: 68 (10/09/20 0309)  Resp: 18 (10/09/20 0309)  BP: 123/75 (10/09/20 0309)  SpO2: 97 % (10/09/20 0309) Vital Signs (24h Range):  Temp:  [98.2 °F (36.8 °C)] 98.2 °F (36.8 °C)  Pulse:  [68-79] 68  Resp:  [18] 18  SpO2:  [97 %-99 %] 97 %  BP: (123-150)/(75-86) 123/75     Weight: 83.9 kg (185 lb)  Body mass index is 30.79 kg/m².    Physical Exam  Vitals signs and nursing note reviewed.   Constitutional:       General: She is not in acute distress.     Appearance: She is well-developed. She is not diaphoretic.   HENT:      Head: Normocephalic and atraumatic.   Eyes:      Pupils: Pupils are equal, round, and reactive to light.   Neck:      Musculoskeletal: Normal range of motion and neck supple.   Cardiovascular:      Rate and Rhythm: Normal rate and regular rhythm.   Pulmonary:      Effort: Pulmonary effort is normal. No respiratory distress.   Abdominal:      General: There is no distension.      Palpations: Abdomen is soft.      Tenderness: There is  abdominal tenderness. There is no guarding.      Comments: Abd soft, non distended  Mild TTP in epigastric region and RUQ   Musculoskeletal: Normal range of motion.   Skin:     General: Skin is warm and dry.   Neurological:      Mental Status: She is alert and oriented to person, place, and time.   Psychiatric:         Mood and Affect: Mood normal.         Behavior: Behavior normal.         Thought Content: Thought content normal.         Judgment: Judgment normal.         Significant Labs:  CBC:   Recent Labs   Lab 10/09/20  0420   WBC 10.10   RBC 4.75   HGB 13.5   HCT 41.5      MCV 87   MCH 28.4   MCHC 32.5     CMP:   Recent Labs   Lab 10/09/20  0033   *   CALCIUM 9.0   ALBUMIN 4.0   PROT 7.0      K 2.9*   CO2 25      BUN 12   CREATININE 0.7   ALKPHOS 69   ALT 17   AST 14   BILITOT 0.3       Significant Diagnostics:  I have reviewed all pertinent imaging results/findings within the past 24 hours.   US showed cholelithiasis with no signs of acute cholecystitis, CBD was 4.4 mm

## 2020-10-09 NOTE — TREATMENT PLAN
GI Post Procedure Treatment Plan    Interval history:  EGD completed. Normal esophagus, stomach and duodenum. Biopsies taken for H pylori.    Plan:  - s/p EGD, see full report  - f/u pathology  - advance diet as tolerated  - Recommend CT A/P to rule out other causes of abdominal pain. She could still have biliary colic although her symptoms are not classic of PUD, biliary colic or any other disease entity.

## 2020-10-09 NOTE — ANESTHESIA POSTPROCEDURE EVALUATION
Anesthesia Post Evaluation    Patient: Priscilla Lim    Procedure(s) Performed: Procedure(s) (LRB):  EGD (ESOPHAGOGASTRODUODENOSCOPY) (N/A)    Final Anesthesia Type: general    Patient location during evaluation: PACU  Patient participation: Yes- Able to Participate  Level of consciousness: awake and alert  Post-procedure vital signs: reviewed and stable  Pain management: adequate  Airway patency: patent    PONV status at discharge: No PONV  Anesthetic complications: no      Cardiovascular status: blood pressure returned to baseline  Respiratory status: spontaneous ventilation  Hydration status: euvolemic  Follow-up not needed.          Vitals Value Taken Time   /95 10/09/20 1730   Temp 36.5 °C (97.7 °F) 10/09/20 1700   Pulse 70 10/09/20 1730   Resp 20 10/09/20 1730   SpO2 97 % 10/09/20 1730         No case tracking events are documented in the log.      Pain/Reji Score: Pain Rating Prior to Med Admin: 6 (10/9/2020 11:36 AM)  Reji Score: 10 (10/9/2020  5:15 PM)

## 2020-10-09 NOTE — DISCHARGE INSTRUCTIONS

## 2020-10-10 LAB
ALBUMIN SERPL BCP-MCNC: 3.1 G/DL (ref 3.5–5.2)
ALP SERPL-CCNC: 63 U/L (ref 55–135)
ALT SERPL W/O P-5'-P-CCNC: 16 U/L (ref 10–44)
ANION GAP SERPL CALC-SCNC: 8 MMOL/L (ref 8–16)
AST SERPL-CCNC: 13 U/L (ref 10–40)
BASOPHILS # BLD AUTO: 0.03 K/UL (ref 0–0.2)
BASOPHILS NFR BLD: 0.7 % (ref 0–1.9)
BILIRUB SERPL-MCNC: 0.4 MG/DL (ref 0.1–1)
BUN SERPL-MCNC: 10 MG/DL (ref 8–23)
CALCIUM SERPL-MCNC: 8.5 MG/DL (ref 8.7–10.5)
CHLORIDE SERPL-SCNC: 103 MMOL/L (ref 95–110)
CO2 SERPL-SCNC: 28 MMOL/L (ref 23–29)
CREAT SERPL-MCNC: 0.6 MG/DL (ref 0.5–1.4)
DIFFERENTIAL METHOD: NORMAL
EOSINOPHIL # BLD AUTO: 0.1 K/UL (ref 0–0.5)
EOSINOPHIL NFR BLD: 2.9 % (ref 0–8)
ERYTHROCYTE [DISTWIDTH] IN BLOOD BY AUTOMATED COUNT: 13.9 % (ref 11.5–14.5)
EST. GFR  (AFRICAN AMERICAN): >60 ML/MIN/1.73 M^2
EST. GFR  (NON AFRICAN AMERICAN): >60 ML/MIN/1.73 M^2
GLUCOSE SERPL-MCNC: 97 MG/DL (ref 70–110)
HCT VFR BLD AUTO: 38.4 % (ref 37–48.5)
HGB BLD-MCNC: 12.4 G/DL (ref 12–16)
IMM GRANULOCYTES # BLD AUTO: 0.01 K/UL (ref 0–0.04)
IMM GRANULOCYTES NFR BLD AUTO: 0.2 % (ref 0–0.5)
LYMPHOCYTES # BLD AUTO: 1.8 K/UL (ref 1–4.8)
LYMPHOCYTES NFR BLD: 41.5 % (ref 18–48)
MAGNESIUM SERPL-MCNC: 1.9 MG/DL (ref 1.6–2.6)
MCH RBC QN AUTO: 28.4 PG (ref 27–31)
MCHC RBC AUTO-ENTMCNC: 32.3 G/DL (ref 32–36)
MCV RBC AUTO: 88 FL (ref 82–98)
MONOCYTES # BLD AUTO: 0.5 K/UL (ref 0.3–1)
MONOCYTES NFR BLD: 10.8 % (ref 4–15)
NEUTROPHILS # BLD AUTO: 1.9 K/UL (ref 1.8–7.7)
NEUTROPHILS NFR BLD: 43.9 % (ref 38–73)
NRBC BLD-RTO: 0 /100 WBC
PHOSPHATE SERPL-MCNC: 3.7 MG/DL (ref 2.7–4.5)
PLATELET # BLD AUTO: 197 K/UL (ref 150–350)
PMV BLD AUTO: 10.7 FL (ref 9.2–12.9)
POTASSIUM SERPL-SCNC: 3.1 MMOL/L (ref 3.5–5.1)
PROT SERPL-MCNC: 6 G/DL (ref 6–8.4)
RBC # BLD AUTO: 4.36 M/UL (ref 4–5.4)
SODIUM SERPL-SCNC: 139 MMOL/L (ref 136–145)
WBC # BLD AUTO: 4.43 K/UL (ref 3.9–12.7)

## 2020-10-10 PROCEDURE — 36415 COLL VENOUS BLD VENIPUNCTURE: CPT

## 2020-10-10 PROCEDURE — 99900035 HC TECH TIME PER 15 MIN (STAT)

## 2020-10-10 PROCEDURE — 85025 COMPLETE CBC W/AUTO DIFF WBC: CPT

## 2020-10-10 PROCEDURE — 25000003 PHARM REV CODE 250: Performed by: STUDENT IN AN ORGANIZED HEALTH CARE EDUCATION/TRAINING PROGRAM

## 2020-10-10 PROCEDURE — 96361 HYDRATE IV INFUSION ADD-ON: CPT

## 2020-10-10 PROCEDURE — 80053 COMPREHEN METABOLIC PANEL: CPT

## 2020-10-10 PROCEDURE — 83735 ASSAY OF MAGNESIUM: CPT

## 2020-10-10 PROCEDURE — G0378 HOSPITAL OBSERVATION PER HR: HCPCS

## 2020-10-10 PROCEDURE — 96372 THER/PROPH/DIAG INJ SC/IM: CPT | Mod: 59

## 2020-10-10 PROCEDURE — 25500020 PHARM REV CODE 255: Performed by: STUDENT IN AN ORGANIZED HEALTH CARE EDUCATION/TRAINING PROGRAM

## 2020-10-10 PROCEDURE — 63600175 PHARM REV CODE 636 W HCPCS: Performed by: STUDENT IN AN ORGANIZED HEALTH CARE EDUCATION/TRAINING PROGRAM

## 2020-10-10 PROCEDURE — 84100 ASSAY OF PHOSPHORUS: CPT

## 2020-10-10 RX ORDER — POTASSIUM CHLORIDE 20 MEQ/1
80 TABLET, EXTENDED RELEASE ORAL ONCE
Status: COMPLETED | OUTPATIENT
Start: 2020-10-10 | End: 2020-10-10

## 2020-10-10 RX ADMIN — HYDROCHLOROTHIAZIDE 25 MG: 25 TABLET ORAL at 08:10

## 2020-10-10 RX ADMIN — IOHEXOL 100 ML: 350 INJECTION, SOLUTION INTRAVENOUS at 02:10

## 2020-10-10 RX ADMIN — FLUTICASONE PROPIONATE 50 MCG: 50 SPRAY, METERED NASAL at 08:10

## 2020-10-10 RX ADMIN — IOHEXOL 15 ML: 350 INJECTION, SOLUTION INTRAVENOUS at 12:10

## 2020-10-10 RX ADMIN — SODIUM CHLORIDE, SODIUM LACTATE, POTASSIUM CHLORIDE, AND CALCIUM CHLORIDE: .6; .31; .03; .02 INJECTION, SOLUTION INTRAVENOUS at 12:10

## 2020-10-10 RX ADMIN — OXYCODONE HYDROCHLORIDE AND ACETAMINOPHEN 1 TABLET: 5; 325 TABLET ORAL at 03:10

## 2020-10-10 RX ADMIN — OXYCODONE HYDROCHLORIDE AND ACETAMINOPHEN 1 TABLET: 5; 325 TABLET ORAL at 08:10

## 2020-10-10 RX ADMIN — ENOXAPARIN SODIUM 40 MG: 40 INJECTION SUBCUTANEOUS at 05:10

## 2020-10-10 RX ADMIN — PANTOPRAZOLE SODIUM 40 MG: 40 TABLET, DELAYED RELEASE ORAL at 08:10

## 2020-10-10 RX ADMIN — METHIMAZOLE 5 MG: 5 TABLET ORAL at 08:10

## 2020-10-10 RX ADMIN — POTASSIUM CHLORIDE 80 MEQ: 1500 TABLET, EXTENDED RELEASE ORAL at 08:10

## 2020-10-10 NOTE — SUBJECTIVE & OBJECTIVE
Interval History: No acute events overnight.  EGD yesterday normal. Biopsies taken. H pylori stool antigen not yet collected. Tolerated regular diet without pain.     Medications:  Continuous Infusions:   lactated ringers 125 mL/hr at 10/10/20 0044     Scheduled Meds:   enoxaparin  40 mg Subcutaneous Q24H    fluticasone propionate  1 spray Each Nostril Daily    hydroCHLOROthiazide  25 mg Oral BID    methIMAzole  5 mg Oral Daily    pantoprazole  40 mg Oral Daily     PRN Meds:acetaminophen, albuterol, melatonin, ondansetron, oxyCODONE-acetaminophen, sodium chloride 0.9%     Review of patient's allergies indicates:  No Known Allergies  Objective:     Vital Signs (Most Recent):  Temp: 97.8 °F (36.6 °C) (10/10/20 0810)  Pulse: 73 (10/10/20 0810)  Resp: 17 (10/10/20 0830)  BP: 128/89 (10/10/20 0810)  SpO2: 96 % (10/10/20 0810) Vital Signs (24h Range):  Temp:  [96 °F (35.6 °C)-99.3 °F (37.4 °C)] 97.8 °F (36.6 °C)  Pulse:  [60-80] 73  Resp:  [13-20] 17  SpO2:  [94 %-98 %] 96 %  BP: (111-136)/(66-95) 128/89     Weight: 87.9 kg (193 lb 12.6 oz)  Body mass index is 33.49 kg/m².    Intake/Output - Last 3 Shifts       10/08 0700 - 10/09 0659 10/09 0700 - 10/10 0659 10/10 0700 - 10/11 0659    P.O.  180 360    I.V. (mL/kg)  1700 (19.3)     IV Piggyback 1000      Total Intake(mL/kg) 1000 (11.9) 1880 (21.4) 360 (4.1)    Net +1000 +1880 +360           Urine Occurrence  2 x 1 x    Stool Occurrence  0 x     Emesis Occurrence  0 x           Physical Exam  Vitals signs and nursing note reviewed.   Constitutional:       General: She is not in acute distress.     Appearance: She is well-developed. She is not diaphoretic.   HENT:      Head: Normocephalic and atraumatic.   Eyes:      Pupils: Pupils are equal, round, and reactive to light.   Neck:      Musculoskeletal: Normal range of motion and neck supple.   Cardiovascular:      Rate and Rhythm: Normal rate and regular rhythm.   Pulmonary:      Effort: Pulmonary effort is normal. No  respiratory distress.   Abdominal:      General: There is no distension.      Palpations: Abdomen is soft.      Tenderness: There is abdominal tenderness. There is no guarding.      Comments: Abd soft, non distended  Mild TTP in epigastric region and RUQ   Musculoskeletal: Normal range of motion.   Skin:     General: Skin is warm and dry.   Neurological:      Mental Status: She is alert and oriented to person, place, and time.   Psychiatric:         Mood and Affect: Mood normal.         Behavior: Behavior normal.         Thought Content: Thought content normal.         Judgment: Judgment normal.         Significant Labs:  CBC:   Recent Labs   Lab 10/10/20  0457   WBC 4.43   RBC 4.36   HGB 12.4   HCT 38.4      MCV 88   MCH 28.4   MCHC 32.3     CMP:   Recent Labs   Lab 10/10/20  0457   GLU 97   CALCIUM 8.5*   ALBUMIN 3.1*   PROT 6.0      K 3.1*   CO2 28      BUN 10   CREATININE 0.6   ALKPHOS 63   ALT 16   AST 13   BILITOT 0.4       Significant Diagnostics:  I have reviewed all pertinent imaging results/findings within the past 24 hours.

## 2020-10-10 NOTE — PLAN OF CARE
Quiet hours. No complaints. Slept well. Iv fluids infusing. Tolerated regular diet. Vss. Safety maintained.

## 2020-10-10 NOTE — ASSESSMENT & PLAN NOTE
Patient is 66 yo F with biliary colic vs symptomatic peptic ulcer disease. Known cholelithiasis, but no signs of cholecystitis.    EGD 10/9 normal - follow up h pylori biopsies  CT abdomen today to rule out other cause of RUQ/epigastric pain  Continue regular diet, d/c mIVF  GI cocktail  Protonix  Home meds  Lovenox    Dispo: will f/u CT scan today. If no findings, will likely discharge tomorrow and plan for outpatient follow up

## 2020-10-10 NOTE — PLAN OF CARE
Patient resting in bed comfortably. iv intact and free of irration, fall precautions maintained no falls noted. Call light in reach bed locked and in lowest position. Non skid socks on while out of bed. Patient instructed to call for assistance. Skin integrity maintained as patient is independent with frequent positioning, C/o pain managed with PRN meds, No other complaints or concerns. Pt tolerating diet, scds on, using I.s, Will continue to monitor and follow careplan of care.

## 2020-10-10 NOTE — PROGRESS NOTES
"[facility]  General Surgery  Progress Note    Subjective:     History of Present Illness:  Patient is 66 yo F with history of HTN and Graves who presents with 1 month of epigastric and RUQ pain that worsens with meals. She reports first noticing this pain approx 1 month ago, and since then it has been regularly triggered with meals. She describes the pain as a sharp heaviness that starts in her epigastric region and radiates to her RUQ. The pain lasts about 30 minutes and then resolves. She has thrown up twice now in the last day. She also notes early satiety. She denies any fevers, chills, or history of these pain episodes in past.  Upon arrival to ED her labs were unremarkable, a RUQ US showed cholelithiasis with no signs of cholecystitis and General Surgery was consulted.    She does note previous upper endoscopy in the past, but that was in [place]. Doesn't recall any mention of peptic ulcers. She also has a positive H. Pylori IgG in 2008, but it isn't clear from the [facility] documents wether she ever received triple therapy.    She denies any intra-abdominal surgical hx, does have hx of "tummy tuck" in past  Denies hx of MI, CVA, anticoagulation      Post-Op Info:  Procedure(s) (LRB):  EGD (ESOPHAGOGASTRODUODENOSCOPY) (N/A)   1 Day Post-Op     Interval History: No acute events overnight.  EGD yesterday normal. Biopsies taken. H pylori stool antigen not yet collected. Tolerated regular diet without pain.     Medications:  Continuous Infusions:   lactated ringers 125 mL/hr at 10/10/20 0044     Scheduled Meds:   enoxaparin  40 mg Subcutaneous Q24H    fluticasone propionate  1 spray Each Nostril Daily    hydroCHLOROthiazide  25 mg Oral BID    methIMAzole  5 mg Oral Daily    pantoprazole  40 mg Oral Daily     PRN Meds:acetaminophen, albuterol, melatonin, ondansetron, oxyCODONE-acetaminophen, sodium chloride 0.9%     Review of patient's allergies indicates:  No Known " Allergies  Objective:     Vital Signs (Most Recent):  Temp: 97.8 °F (36.6 °C) (10/10/20 0810)  Pulse: 73 (10/10/20 0810)  Resp: 17 (10/10/20 0830)  BP: 128/89 (10/10/20 0810)  SpO2: 96 % (10/10/20 0810) Vital Signs (24h Range):  Temp:  [96 °F (35.6 °C)-99.3 °F (37.4 °C)] 97.8 °F (36.6 °C)  Pulse:  [60-80] 73  Resp:  [13-20] 17  SpO2:  [94 %-98 %] 96 %  BP: (111-136)/(66-95) 128/89     Weight: 87.9 kg (193 lb 12.6 oz)  Body mass index is 33.49 kg/m².    Intake/Output - Last 3 Shifts       10/08 0700 - 10/09 0659 10/09 0700 - 10/10 0659 10/10 0700 - 10/11 0659    P.O.  180 360    I.V. (mL/kg)  1700 (19.3)     IV Piggyback 1000      Total Intake(mL/kg) 1000 (11.9) 1880 (21.4) 360 (4.1)    Net +1000 +1880 +360           Urine Occurrence  2 x 1 x    Stool Occurrence  0 x     Emesis Occurrence  0 x           Physical Exam  Vitals signs and nursing note reviewed.   Constitutional:       General: She is not in acute distress.     Appearance: She is well-developed. She is not diaphoretic.   HENT:      Head: Normocephalic and atraumatic.   Eyes:      Pupils: Pupils are equal, round, and reactive to light.   Neck:      Musculoskeletal: Normal range of motion and neck supple.   Cardiovascular:      Rate and Rhythm: Normal rate and regular rhythm.   Pulmonary:      Effort: Pulmonary effort is normal. No respiratory distress.   Abdominal:      General: There is no distension.      Palpations: Abdomen is soft.      Tenderness: There is abdominal tenderness. There is no guarding.      Comments: Abd soft, non distended  Mild TTP in epigastric region and RUQ   Musculoskeletal: Normal range of motion.   Skin:     General: Skin is warm and dry.   Neurological:      Mental Status: She is alert and oriented to person, place, and time.   Psychiatric:         Mood and Affect: Mood normal.         Behavior: Behavior normal.         Thought Content: Thought content normal.         Judgment: Judgment normal.         Significant Labs:  CBC:    Recent Labs   Lab 10/10/20  0457   WBC 4.43   RBC 4.36   HGB 12.4   HCT 38.4      MCV 88   MCH 28.4   MCHC 32.3     CMP:   Recent Labs   Lab 10/10/20  0457   GLU 97   CALCIUM 8.5*   ALBUMIN 3.1*   PROT 6.0      K 3.1*   CO2 28      BUN 10   CREATININE 0.6   ALKPHOS 63   ALT 16   AST 13   BILITOT 0.4       Significant Diagnostics:  I have reviewed all pertinent imaging results/findings within the past 24 hours.    Assessment/Plan:     * Abdominal pain  Patient is 66 yo F with biliary colic vs symptomatic peptic ulcer disease. Known cholelithiasis, but no signs of cholecystitis.    EGD 10/9 normal - follow up h pylori biopsies  CT abdomen today to rule out other cause of RUQ/epigastric pain  Continue regular diet, d/c mIVF  GI cocktail  Protonix  Home meds  Lovenox    Dispo: will f/u CT scan today. If no findings, will likely discharge tomorrow and plan for outpatient follow up        Korina Cruz MD  General Surgery  [facility]

## 2020-10-11 ENCOUNTER — ANESTHESIA EVENT (OUTPATIENT)
Dept: SURGERY | Facility: HOSPITAL | Age: 65
End: 2020-10-11
Payer: MEDICAID

## 2020-10-11 LAB
ALBUMIN SERPL BCP-MCNC: 3.4 G/DL (ref 3.5–5.2)
ALP SERPL-CCNC: 68 U/L (ref 55–135)
ALT SERPL W/O P-5'-P-CCNC: 17 U/L (ref 10–44)
ANION GAP SERPL CALC-SCNC: 10 MMOL/L (ref 8–16)
AST SERPL-CCNC: 14 U/L (ref 10–40)
BASOPHILS # BLD AUTO: 0.05 K/UL (ref 0–0.2)
BASOPHILS NFR BLD: 1.2 % (ref 0–1.9)
BILIRUB SERPL-MCNC: 0.3 MG/DL (ref 0.1–1)
BUN SERPL-MCNC: 12 MG/DL (ref 8–23)
CALCIUM SERPL-MCNC: 8.9 MG/DL (ref 8.7–10.5)
CHLORIDE SERPL-SCNC: 103 MMOL/L (ref 95–110)
CO2 SERPL-SCNC: 27 MMOL/L (ref 23–29)
CREAT SERPL-MCNC: 0.7 MG/DL (ref 0.5–1.4)
DIFFERENTIAL METHOD: NORMAL
EOSINOPHIL # BLD AUTO: 0.2 K/UL (ref 0–0.5)
EOSINOPHIL NFR BLD: 3.9 % (ref 0–8)
ERYTHROCYTE [DISTWIDTH] IN BLOOD BY AUTOMATED COUNT: 13.8 % (ref 11.5–14.5)
EST. GFR  (AFRICAN AMERICAN): >60 ML/MIN/1.73 M^2
EST. GFR  (NON AFRICAN AMERICAN): >60 ML/MIN/1.73 M^2
GLUCOSE SERPL-MCNC: 103 MG/DL (ref 70–110)
HCT VFR BLD AUTO: 40.3 % (ref 37–48.5)
HGB BLD-MCNC: 13.1 G/DL (ref 12–16)
IMM GRANULOCYTES # BLD AUTO: 0.01 K/UL (ref 0–0.04)
IMM GRANULOCYTES NFR BLD AUTO: 0.2 % (ref 0–0.5)
LYMPHOCYTES # BLD AUTO: 1.7 K/UL (ref 1–4.8)
LYMPHOCYTES NFR BLD: 38.5 % (ref 18–48)
MAGNESIUM SERPL-MCNC: 1.8 MG/DL (ref 1.6–2.6)
MCH RBC QN AUTO: 28.3 PG (ref 27–31)
MCHC RBC AUTO-ENTMCNC: 32.5 G/DL (ref 32–36)
MCV RBC AUTO: 87 FL (ref 82–98)
MONOCYTES # BLD AUTO: 0.5 K/UL (ref 0.3–1)
MONOCYTES NFR BLD: 10.4 % (ref 4–15)
NEUTROPHILS # BLD AUTO: 2 K/UL (ref 1.8–7.7)
NEUTROPHILS NFR BLD: 45.8 % (ref 38–73)
NRBC BLD-RTO: 0 /100 WBC
PHOSPHATE SERPL-MCNC: 4.2 MG/DL (ref 2.7–4.5)
PLATELET # BLD AUTO: 212 K/UL (ref 150–350)
PMV BLD AUTO: 11.1 FL (ref 9.2–12.9)
POTASSIUM SERPL-SCNC: 3.5 MMOL/L (ref 3.5–5.1)
PROT SERPL-MCNC: 6.5 G/DL (ref 6–8.4)
RBC # BLD AUTO: 4.63 M/UL (ref 4–5.4)
SARS-COV-2 RDRP RESP QL NAA+PROBE: NEGATIVE
SODIUM SERPL-SCNC: 140 MMOL/L (ref 136–145)
WBC # BLD AUTO: 4.31 K/UL (ref 3.9–12.7)

## 2020-10-11 PROCEDURE — 25000003 PHARM REV CODE 250: Performed by: STUDENT IN AN ORGANIZED HEALTH CARE EDUCATION/TRAINING PROGRAM

## 2020-10-11 PROCEDURE — 63600175 PHARM REV CODE 636 W HCPCS: Performed by: STUDENT IN AN ORGANIZED HEALTH CARE EDUCATION/TRAINING PROGRAM

## 2020-10-11 PROCEDURE — 96372 THER/PROPH/DIAG INJ SC/IM: CPT | Mod: 59

## 2020-10-11 PROCEDURE — 84100 ASSAY OF PHOSPHORUS: CPT

## 2020-10-11 PROCEDURE — 83735 ASSAY OF MAGNESIUM: CPT

## 2020-10-11 PROCEDURE — 80053 COMPREHEN METABOLIC PANEL: CPT

## 2020-10-11 PROCEDURE — U0002 COVID-19 LAB TEST NON-CDC: HCPCS

## 2020-10-11 PROCEDURE — 36415 COLL VENOUS BLD VENIPUNCTURE: CPT

## 2020-10-11 PROCEDURE — 87338 HPYLORI STOOL AG IA: CPT

## 2020-10-11 PROCEDURE — G0378 HOSPITAL OBSERVATION PER HR: HCPCS

## 2020-10-11 PROCEDURE — 96375 TX/PRO/DX INJ NEW DRUG ADDON: CPT

## 2020-10-11 PROCEDURE — 85025 COMPLETE CBC W/AUTO DIFF WBC: CPT

## 2020-10-11 RX ORDER — POTASSIUM CHLORIDE 750 MG/1
50 CAPSULE, EXTENDED RELEASE ORAL ONCE
Status: DISCONTINUED | OUTPATIENT
Start: 2020-10-11 | End: 2020-10-11

## 2020-10-11 RX ORDER — POTASSIUM CHLORIDE 750 MG/1
30 CAPSULE, EXTENDED RELEASE ORAL ONCE
Status: COMPLETED | OUTPATIENT
Start: 2020-10-11 | End: 2020-10-11

## 2020-10-11 RX ORDER — LANOLIN ALCOHOL/MO/W.PET/CERES
800 CREAM (GRAM) TOPICAL ONCE
Status: COMPLETED | OUTPATIENT
Start: 2020-10-11 | End: 2020-10-11

## 2020-10-11 RX ORDER — POTASSIUM CHLORIDE 20 MEQ/1
20 TABLET, EXTENDED RELEASE ORAL ONCE
Status: COMPLETED | OUTPATIENT
Start: 2020-10-11 | End: 2020-10-11

## 2020-10-11 RX ADMIN — METHIMAZOLE 5 MG: 5 TABLET ORAL at 08:10

## 2020-10-11 RX ADMIN — ENOXAPARIN SODIUM 40 MG: 40 INJECTION SUBCUTANEOUS at 04:10

## 2020-10-11 RX ADMIN — HYDROCHLOROTHIAZIDE 25 MG: 25 TABLET ORAL at 09:10

## 2020-10-11 RX ADMIN — Medication 800 MG: at 10:10

## 2020-10-11 RX ADMIN — PANTOPRAZOLE SODIUM 40 MG: 40 TABLET, DELAYED RELEASE ORAL at 08:10

## 2020-10-11 RX ADMIN — FLUTICASONE PROPIONATE 50 MCG: 50 SPRAY, METERED NASAL at 08:10

## 2020-10-11 RX ADMIN — POTASSIUM CHLORIDE 30 MEQ: 10 CAPSULE, COATED, EXTENDED RELEASE ORAL at 11:10

## 2020-10-11 RX ADMIN — HYDROCHLOROTHIAZIDE 25 MG: 25 TABLET ORAL at 08:10

## 2020-10-11 RX ADMIN — POTASSIUM CHLORIDE 20 MEQ: 1500 TABLET, EXTENDED RELEASE ORAL at 10:10

## 2020-10-11 RX ADMIN — CEFTRIAXONE 2 G: 2 INJECTION, SOLUTION INTRAVENOUS at 11:10

## 2020-10-11 NOTE — PROGRESS NOTES
"[facility]  General Surgery  Progress Note    Subjective:     History of Present Illness:  Patient is 64 yo F with history of HTN and Graves who presents with 1 month of epigastric and RUQ pain that worsens with meals. She reports first noticing this pain approx 1 month ago, and since then it has been regularly triggered with meals. She describes the pain as a sharp heaviness that starts in her epigastric region and radiates to her RUQ. The pain lasts about 30 minutes and then resolves. She has thrown up twice now in the last day. She also notes early satiety. She denies any fevers, chills, or history of these pain episodes in past.  Upon arrival to ED her labs were unremarkable, a RUQ US showed cholelithiasis with no signs of cholecystitis and General Surgery was consulted.    She does note previous upper endoscopy in the past, but that was in [place]. Doesn't recall any mention of peptic ulcers. She also has a positive H. Pylori IgG in 2008, but it isn't clear from the [facility] documents wether she ever received triple therapy.    She denies any intra-abdominal surgical hx, does have hx of "tummy tuck" in past  Denies hx of MI, CVA, anticoagulation      Post-Op Info:  Procedure(s) (LRB):  EGD (ESOPHAGOGASTRODUODENOSCOPY) (N/A)   2 Days Post-Op     Interval History:   NAEON  Still with epigastric pain  CT showed gallbladder wall thickening    Medications:  Continuous Infusions:  Scheduled Meds:   cefTRIAXone (ROCEPHIN) IVPB  2 g Intravenous Q24H    enoxaparin  40 mg Subcutaneous Q24H    fluticasone propionate  1 spray Each Nostril Daily    hydroCHLOROthiazide  25 mg Oral BID    magnesium oxide  800 mg Oral Once    methIMAzole  5 mg Oral Daily    pantoprazole  40 mg Oral Daily    potassium chloride  30 mEq Oral Once    potassium chloride  20 mEq Oral Once     PRN Meds:acetaminophen, albuterol, melatonin, ondansetron, oxyCODONE-acetaminophen, sodium chloride 0.9% "     Review of patient's allergies indicates:  No Known Allergies  Objective:     Vital Signs (Most Recent):  Temp: 98.2 °F (36.8 °C) (10/11/20 0800)  Pulse: 70 (10/11/20 0809)  Resp: 17 (10/11/20 0809)  BP: (!) 142/95 (10/11/20 0809)  SpO2: 98 % (10/11/20 0809) Vital Signs (24h Range):  Temp:  [96.3 °F (35.7 °C)-98.8 °F (37.1 °C)] 98.2 °F (36.8 °C)  Pulse:  [61-73] 70  Resp:  [11-19] 17  SpO2:  [95 %-98 %] 98 %  BP: (104-142)/(79-95) 142/95     Weight: 87.9 kg (193 lb 12.6 oz)  Body mass index is 33.49 kg/m².    Intake/Output - Last 3 Shifts       10/09 0700 - 10/10 0659 10/10 0700 - 10/11 0659 10/11 0700 - 10/12 0659    P.O. 180 1620     I.V. (mL/kg) 1700 (19.3)      IV Piggyback       Total Intake(mL/kg) 1880 (21.4) 1620 (18.4)     Net +1880 +1620            Urine Occurrence 2 x 3 x 1 x    Stool Occurrence 0 x  1 x    Emesis Occurrence 0 x            Physical Exam  Constitutional:       General: She is not in acute distress.     Appearance: She is well-developed.   Cardiovascular:      Rate and Rhythm: Normal rate and regular rhythm.   Pulmonary:      Effort: Pulmonary effort is normal. No respiratory distress.   Abdominal:      General: There is no distension.      Palpations: Abdomen is soft.      Tenderness: There is no abdominal tenderness.   Skin:     General: Skin is warm and dry.   Neurological:      Mental Status: She is alert and oriented to person, place, and time.   Psychiatric:         Behavior: Behavior normal.         Significant Labs:  CBC:   Recent Labs   Lab 10/11/20  0644   WBC 4.31   RBC 4.63   HGB 13.1   HCT 40.3      MCV 87   MCH 28.3   MCHC 32.5     CMP:   Recent Labs   Lab 10/11/20  0644      CALCIUM 8.9   ALBUMIN 3.4*   PROT 6.5      K 3.5   CO2 27      BUN 12   CREATININE 0.7   ALKPHOS 68   ALT 17   AST 14   BILITOT 0.3       Significant Diagnostics:  I have reviewed all pertinent imaging results/findings within the past 24 hours.    Assessment/Plan:     *  Abdominal pain  Patient is 66 yo F with biliary colic vs symptomatic peptic ulcer disease. Known cholelithiasis, but no signs of cholecystitis.    Plan for lap marlene tomorrow  EGD 10/9 normal - follow up h pylori biopsies  Continue regular diet, NPO at midnight  GI cocktail  Protonix  Home meds  Lovenox            Vanessa Sandra MD  General Surgery  [facility]

## 2020-10-11 NOTE — ASSESSMENT & PLAN NOTE
Patient is 64 yo F with biliary colic vs symptomatic peptic ulcer disease. Known cholelithiasis, but no signs of cholecystitis.    Plan for lap marlene tomorrow  EGD 10/9 normal - follow up h pylori biopsies  Continue regular diet, NPO at midnight  GI cocktail  Protonix  Home meds  Lovenox

## 2020-10-11 NOTE — PLAN OF CARE
Patient resting in bed comfortably. iv intact and free of irration, fall precautions maintained no falls noted. Call light in reach bed locked and in lowest position. Non skid socks on while out of bed. Patient instructed to call for assistance. Skin integrity maintained as patient is independent with frequent positioning, C/o pain managed with PRN meds, No other complaints or concerns. Using I.s, scds on,Will continue to monitor and follow careplan of care.

## 2020-10-11 NOTE — SUBJECTIVE & OBJECTIVE
Interval History:   NAEON  Still with epigastric pain  CT showed gallbladder wall thickening    Medications:  Continuous Infusions:  Scheduled Meds:   cefTRIAXone (ROCEPHIN) IVPB  2 g Intravenous Q24H    enoxaparin  40 mg Subcutaneous Q24H    fluticasone propionate  1 spray Each Nostril Daily    hydroCHLOROthiazide  25 mg Oral BID    magnesium oxide  800 mg Oral Once    methIMAzole  5 mg Oral Daily    pantoprazole  40 mg Oral Daily    potassium chloride  30 mEq Oral Once    potassium chloride  20 mEq Oral Once     PRN Meds:acetaminophen, albuterol, melatonin, ondansetron, oxyCODONE-acetaminophen, sodium chloride 0.9%     Review of patient's allergies indicates:  No Known Allergies  Objective:     Vital Signs (Most Recent):  Temp: 98.2 °F (36.8 °C) (10/11/20 0800)  Pulse: 70 (10/11/20 0809)  Resp: 17 (10/11/20 0809)  BP: (!) 142/95 (10/11/20 0809)  SpO2: 98 % (10/11/20 0809) Vital Signs (24h Range):  Temp:  [96.3 °F (35.7 °C)-98.8 °F (37.1 °C)] 98.2 °F (36.8 °C)  Pulse:  [61-73] 70  Resp:  [11-19] 17  SpO2:  [95 %-98 %] 98 %  BP: (104-142)/(79-95) 142/95     Weight: 87.9 kg (193 lb 12.6 oz)  Body mass index is 33.49 kg/m².    Intake/Output - Last 3 Shifts       10/09 0700 - 10/10 0659 10/10 0700 - 10/11 0659 10/11 0700 - 10/12 0659    P.O. 180 1620     I.V. (mL/kg) 1700 (19.3)      IV Piggyback       Total Intake(mL/kg) 1880 (21.4) 1620 (18.4)     Net +1880 +1620            Urine Occurrence 2 x 3 x 1 x    Stool Occurrence 0 x  1 x    Emesis Occurrence 0 x            Physical Exam  Constitutional:       General: She is not in acute distress.     Appearance: She is well-developed.   Cardiovascular:      Rate and Rhythm: Normal rate and regular rhythm.   Pulmonary:      Effort: Pulmonary effort is normal. No respiratory distress.   Abdominal:      General: There is no distension.      Palpations: Abdomen is soft.      Tenderness: There is no abdominal tenderness.   Skin:     General: Skin is warm and dry.    Neurological:      Mental Status: She is alert and oriented to person, place, and time.   Psychiatric:         Behavior: Behavior normal.         Significant Labs:  CBC:   Recent Labs   Lab 10/11/20  0644   WBC 4.31   RBC 4.63   HGB 13.1   HCT 40.3      MCV 87   MCH 28.3   MCHC 32.5     CMP:   Recent Labs   Lab 10/11/20  0644      CALCIUM 8.9   ALBUMIN 3.4*   PROT 6.5      K 3.5   CO2 27      BUN 12   CREATININE 0.7   ALKPHOS 68   ALT 17   AST 14   BILITOT 0.3       Significant Diagnostics:  I have reviewed all pertinent imaging results/findings within the past 24 hours.

## 2020-10-12 ENCOUNTER — ANESTHESIA (OUTPATIENT)
Dept: SURGERY | Facility: HOSPITAL | Age: 65
End: 2020-10-12
Payer: MEDICAID

## 2020-10-12 LAB
ALBUMIN SERPL BCP-MCNC: 3.4 G/DL (ref 3.5–5.2)
ALP SERPL-CCNC: 68 U/L (ref 55–135)
ALT SERPL W/O P-5'-P-CCNC: 19 U/L (ref 10–44)
ANION GAP SERPL CALC-SCNC: 10 MMOL/L (ref 8–16)
AST SERPL-CCNC: 16 U/L (ref 10–40)
BASOPHILS # BLD AUTO: 0.05 K/UL (ref 0–0.2)
BASOPHILS NFR BLD: 1 % (ref 0–1.9)
BILIRUB SERPL-MCNC: 0.3 MG/DL (ref 0.1–1)
BUN SERPL-MCNC: 11 MG/DL (ref 8–23)
CALCIUM SERPL-MCNC: 9.1 MG/DL (ref 8.7–10.5)
CHLORIDE SERPL-SCNC: 100 MMOL/L (ref 95–110)
CO2 SERPL-SCNC: 28 MMOL/L (ref 23–29)
CREAT SERPL-MCNC: 0.7 MG/DL (ref 0.5–1.4)
DIFFERENTIAL METHOD: NORMAL
EOSINOPHIL # BLD AUTO: 0.2 K/UL (ref 0–0.5)
EOSINOPHIL NFR BLD: 3.9 % (ref 0–8)
ERYTHROCYTE [DISTWIDTH] IN BLOOD BY AUTOMATED COUNT: 13.5 % (ref 11.5–14.5)
EST. GFR  (AFRICAN AMERICAN): >60 ML/MIN/1.73 M^2
EST. GFR  (NON AFRICAN AMERICAN): >60 ML/MIN/1.73 M^2
GLUCOSE SERPL-MCNC: 100 MG/DL (ref 70–110)
HCT VFR BLD AUTO: 41.9 % (ref 37–48.5)
HGB BLD-MCNC: 13.4 G/DL (ref 12–16)
IMM GRANULOCYTES # BLD AUTO: 0.01 K/UL (ref 0–0.04)
IMM GRANULOCYTES NFR BLD AUTO: 0.2 % (ref 0–0.5)
LYMPHOCYTES # BLD AUTO: 2.1 K/UL (ref 1–4.8)
LYMPHOCYTES NFR BLD: 43.6 % (ref 18–48)
MAGNESIUM SERPL-MCNC: 1.8 MG/DL (ref 1.6–2.6)
MCH RBC QN AUTO: 28.3 PG (ref 27–31)
MCHC RBC AUTO-ENTMCNC: 32 G/DL (ref 32–36)
MCV RBC AUTO: 88 FL (ref 82–98)
MONOCYTES # BLD AUTO: 0.5 K/UL (ref 0.3–1)
MONOCYTES NFR BLD: 9.7 % (ref 4–15)
NEUTROPHILS # BLD AUTO: 2 K/UL (ref 1.8–7.7)
NEUTROPHILS NFR BLD: 41.6 % (ref 38–73)
NRBC BLD-RTO: 0 /100 WBC
PHOSPHATE SERPL-MCNC: 3.9 MG/DL (ref 2.7–4.5)
PLATELET # BLD AUTO: 230 K/UL (ref 150–350)
PMV BLD AUTO: 11 FL (ref 9.2–12.9)
POTASSIUM SERPL-SCNC: 3.7 MMOL/L (ref 3.5–5.1)
PROT SERPL-MCNC: 6.5 G/DL (ref 6–8.4)
RBC # BLD AUTO: 4.74 M/UL (ref 4–5.4)
SODIUM SERPL-SCNC: 138 MMOL/L (ref 136–145)
WBC # BLD AUTO: 4.84 K/UL (ref 3.9–12.7)

## 2020-10-12 PROCEDURE — 88304 PR  SURG PATH,LEVEL III: ICD-10-PCS | Mod: 26,,, | Performed by: PATHOLOGY

## 2020-10-12 PROCEDURE — 36000708 HC OR TIME LEV III 1ST 15 MIN: Performed by: STUDENT IN AN ORGANIZED HEALTH CARE EDUCATION/TRAINING PROGRAM

## 2020-10-12 PROCEDURE — 88304 TISSUE EXAM BY PATHOLOGIST: CPT | Performed by: PATHOLOGY

## 2020-10-12 PROCEDURE — 71000033 HC RECOVERY, INTIAL HOUR: Performed by: STUDENT IN AN ORGANIZED HEALTH CARE EDUCATION/TRAINING PROGRAM

## 2020-10-12 PROCEDURE — 25000003 PHARM REV CODE 250: Performed by: STUDENT IN AN ORGANIZED HEALTH CARE EDUCATION/TRAINING PROGRAM

## 2020-10-12 PROCEDURE — 36415 COLL VENOUS BLD VENIPUNCTURE: CPT

## 2020-10-12 PROCEDURE — 37000008 HC ANESTHESIA 1ST 15 MINUTES: Performed by: STUDENT IN AN ORGANIZED HEALTH CARE EDUCATION/TRAINING PROGRAM

## 2020-10-12 PROCEDURE — 47562 PR LAP,CHOLECYSTECTOMY: ICD-10-PCS | Mod: ,,, | Performed by: STUDENT IN AN ORGANIZED HEALTH CARE EDUCATION/TRAINING PROGRAM

## 2020-10-12 PROCEDURE — 84100 ASSAY OF PHOSPHORUS: CPT

## 2020-10-12 PROCEDURE — 25000003 PHARM REV CODE 250: Performed by: NURSE ANESTHETIST, CERTIFIED REGISTERED

## 2020-10-12 PROCEDURE — 00790 ANES IPER UPR ABD NOS: CPT | Performed by: STUDENT IN AN ORGANIZED HEALTH CARE EDUCATION/TRAINING PROGRAM

## 2020-10-12 PROCEDURE — 71000015 HC POSTOP RECOV 1ST HR: Performed by: STUDENT IN AN ORGANIZED HEALTH CARE EDUCATION/TRAINING PROGRAM

## 2020-10-12 PROCEDURE — 94761 N-INVAS EAR/PLS OXIMETRY MLT: CPT

## 2020-10-12 PROCEDURE — 27201423 OPTIME MED/SURG SUP & DEVICES STERILE SUPPLY: Performed by: STUDENT IN AN ORGANIZED HEALTH CARE EDUCATION/TRAINING PROGRAM

## 2020-10-12 PROCEDURE — G0378 HOSPITAL OBSERVATION PER HR: HCPCS

## 2020-10-12 PROCEDURE — D9220A PRA ANESTHESIA: ICD-10-PCS | Mod: CRNA,,, | Performed by: NURSE ANESTHETIST, CERTIFIED REGISTERED

## 2020-10-12 PROCEDURE — D9220A PRA ANESTHESIA: ICD-10-PCS | Mod: ANES,,, | Performed by: ANESTHESIOLOGY

## 2020-10-12 PROCEDURE — 63600175 PHARM REV CODE 636 W HCPCS: Performed by: NURSE ANESTHETIST, CERTIFIED REGISTERED

## 2020-10-12 PROCEDURE — 85025 COMPLETE CBC W/AUTO DIFF WBC: CPT

## 2020-10-12 PROCEDURE — 37000009 HC ANESTHESIA EA ADD 15 MINS: Performed by: STUDENT IN AN ORGANIZED HEALTH CARE EDUCATION/TRAINING PROGRAM

## 2020-10-12 PROCEDURE — 71000016 HC POSTOP RECOV ADDL HR: Performed by: STUDENT IN AN ORGANIZED HEALTH CARE EDUCATION/TRAINING PROGRAM

## 2020-10-12 PROCEDURE — 88304 TISSUE EXAM BY PATHOLOGIST: CPT | Mod: 26,,, | Performed by: PATHOLOGY

## 2020-10-12 PROCEDURE — 36000709 HC OR TIME LEV III EA ADD 15 MIN: Performed by: STUDENT IN AN ORGANIZED HEALTH CARE EDUCATION/TRAINING PROGRAM

## 2020-10-12 PROCEDURE — 63600175 PHARM REV CODE 636 W HCPCS: Performed by: STUDENT IN AN ORGANIZED HEALTH CARE EDUCATION/TRAINING PROGRAM

## 2020-10-12 PROCEDURE — D9220A PRA ANESTHESIA: Mod: CRNA,,, | Performed by: NURSE ANESTHETIST, CERTIFIED REGISTERED

## 2020-10-12 PROCEDURE — 25000242 PHARM REV CODE 250 ALT 637 W/ HCPCS: Performed by: STUDENT IN AN ORGANIZED HEALTH CARE EDUCATION/TRAINING PROGRAM

## 2020-10-12 PROCEDURE — D9220A PRA ANESTHESIA: Mod: ANES,,, | Performed by: ANESTHESIOLOGY

## 2020-10-12 PROCEDURE — 63600175 PHARM REV CODE 636 W HCPCS: Performed by: ANESTHESIOLOGY

## 2020-10-12 PROCEDURE — 80053 COMPREHEN METABOLIC PANEL: CPT

## 2020-10-12 PROCEDURE — 83735 ASSAY OF MAGNESIUM: CPT

## 2020-10-12 PROCEDURE — 96376 TX/PRO/DX INJ SAME DRUG ADON: CPT | Mod: 59

## 2020-10-12 PROCEDURE — 47562 LAPAROSCOPIC CHOLECYSTECTOMY: CPT | Mod: ,,, | Performed by: STUDENT IN AN ORGANIZED HEALTH CARE EDUCATION/TRAINING PROGRAM

## 2020-10-12 PROCEDURE — 96372 THER/PROPH/DIAG INJ SC/IM: CPT | Mod: 59

## 2020-10-12 RX ORDER — PROPOFOL 10 MG/ML
VIAL (ML) INTRAVENOUS
Status: DISCONTINUED | OUTPATIENT
Start: 2020-10-12 | End: 2020-10-12

## 2020-10-12 RX ORDER — ONDANSETRON 2 MG/ML
INJECTION INTRAMUSCULAR; INTRAVENOUS
Status: DISCONTINUED | OUTPATIENT
Start: 2020-10-12 | End: 2020-10-12

## 2020-10-12 RX ORDER — PHENYLEPHRINE HYDROCHLORIDE 10 MG/ML
INJECTION INTRAVENOUS
Status: DISCONTINUED | OUTPATIENT
Start: 2020-10-12 | End: 2020-10-12

## 2020-10-12 RX ORDER — KETAMINE HCL IN 0.9 % NACL 50 MG/5 ML
SYRINGE (ML) INTRAVENOUS
Status: DISCONTINUED | OUTPATIENT
Start: 2020-10-12 | End: 2020-10-12

## 2020-10-12 RX ORDER — SODIUM CHLORIDE 9 MG/ML
INJECTION, SOLUTION INTRAVENOUS CONTINUOUS PRN
Status: DISCONTINUED | OUTPATIENT
Start: 2020-10-12 | End: 2020-10-12

## 2020-10-12 RX ORDER — DEXAMETHASONE SODIUM PHOSPHATE 4 MG/ML
INJECTION, SOLUTION INTRA-ARTICULAR; INTRALESIONAL; INTRAMUSCULAR; INTRAVENOUS; SOFT TISSUE
Status: DISCONTINUED | OUTPATIENT
Start: 2020-10-12 | End: 2020-10-12

## 2020-10-12 RX ORDER — FENTANYL CITRATE 50 UG/ML
INJECTION, SOLUTION INTRAMUSCULAR; INTRAVENOUS
Status: DISCONTINUED | OUTPATIENT
Start: 2020-10-12 | End: 2020-10-12

## 2020-10-12 RX ORDER — FAMOTIDINE 10 MG/ML
INJECTION INTRAVENOUS
Status: DISCONTINUED | OUTPATIENT
Start: 2020-10-12 | End: 2020-10-12

## 2020-10-12 RX ORDER — CEFAZOLIN SODIUM 1 G/3ML
INJECTION, POWDER, FOR SOLUTION INTRAMUSCULAR; INTRAVENOUS
Status: DISCONTINUED | OUTPATIENT
Start: 2020-10-12 | End: 2020-10-12

## 2020-10-12 RX ORDER — SODIUM CHLORIDE 0.9 % (FLUSH) 0.9 %
3 SYRINGE (ML) INJECTION
Status: DISCONTINUED | OUTPATIENT
Start: 2020-10-12 | End: 2020-10-13 | Stop reason: HOSPADM

## 2020-10-12 RX ORDER — ROCURONIUM BROMIDE 10 MG/ML
INJECTION, SOLUTION INTRAVENOUS
Status: DISCONTINUED | OUTPATIENT
Start: 2020-10-12 | End: 2020-10-12

## 2020-10-12 RX ORDER — BUPIVACAINE HYDROCHLORIDE 2.5 MG/ML
INJECTION, SOLUTION EPIDURAL; INFILTRATION; INTRACAUDAL
Status: DISCONTINUED | OUTPATIENT
Start: 2020-10-12 | End: 2020-10-12 | Stop reason: HOSPADM

## 2020-10-12 RX ORDER — LIDOCAINE HYDROCHLORIDE 20 MG/ML
INJECTION INTRAVENOUS
Status: DISCONTINUED | OUTPATIENT
Start: 2020-10-12 | End: 2020-10-12

## 2020-10-12 RX ORDER — MIDAZOLAM HYDROCHLORIDE 1 MG/ML
INJECTION, SOLUTION INTRAMUSCULAR; INTRAVENOUS
Status: DISCONTINUED | OUTPATIENT
Start: 2020-10-12 | End: 2020-10-12

## 2020-10-12 RX ORDER — HYDROMORPHONE HYDROCHLORIDE 1 MG/ML
0.2 INJECTION, SOLUTION INTRAMUSCULAR; INTRAVENOUS; SUBCUTANEOUS EVERY 5 MIN PRN
Status: DISCONTINUED | OUTPATIENT
Start: 2020-10-12 | End: 2020-10-12 | Stop reason: HOSPADM

## 2020-10-12 RX ADMIN — ONDANSETRON 4 MG: 2 INJECTION, SOLUTION INTRAMUSCULAR; INTRAVENOUS at 12:10

## 2020-10-12 RX ADMIN — LIDOCAINE HYDROCHLORIDE 100 MG: 20 INJECTION, SOLUTION INTRAVENOUS at 10:10

## 2020-10-12 RX ADMIN — FLUTICASONE PROPIONATE 50 MCG: 50 SPRAY, METERED NASAL at 09:10

## 2020-10-12 RX ADMIN — HYDROCHLOROTHIAZIDE 25 MG: 25 TABLET ORAL at 09:10

## 2020-10-12 RX ADMIN — PHENYLEPHRINE HYDROCHLORIDE 200 MCG: 10 INJECTION INTRAVENOUS at 11:10

## 2020-10-12 RX ADMIN — ENOXAPARIN SODIUM 40 MG: 40 INJECTION SUBCUTANEOUS at 05:10

## 2020-10-12 RX ADMIN — FENTANYL CITRATE 25 MCG: 50 INJECTION, SOLUTION INTRAMUSCULAR; INTRAVENOUS at 11:10

## 2020-10-12 RX ADMIN — SODIUM CHLORIDE, SODIUM GLUCONATE, SODIUM ACETATE, POTASSIUM CHLORIDE, MAGNESIUM CHLORIDE, SODIUM PHOSPHATE, DIBASIC, AND POTASSIUM PHOSPHATE: .53; .5; .37; .037; .03; .012; .00082 INJECTION, SOLUTION INTRAVENOUS at 12:10

## 2020-10-12 RX ADMIN — HYDROMORPHONE HYDROCHLORIDE 0.2 MG: 1 INJECTION, SOLUTION INTRAMUSCULAR; INTRAVENOUS; SUBCUTANEOUS at 01:10

## 2020-10-12 RX ADMIN — OXYCODONE HYDROCHLORIDE AND ACETAMINOPHEN 1 TABLET: 5; 325 TABLET ORAL at 01:10

## 2020-10-12 RX ADMIN — METHIMAZOLE 5 MG: 5 TABLET ORAL at 08:10

## 2020-10-12 RX ADMIN — FENTANYL CITRATE 50 MCG: 50 INJECTION, SOLUTION INTRAMUSCULAR; INTRAVENOUS at 10:10

## 2020-10-12 RX ADMIN — PHENYLEPHRINE HYDROCHLORIDE 200 MCG: 10 INJECTION INTRAVENOUS at 12:10

## 2020-10-12 RX ADMIN — FAMOTIDINE 20 MG: 10 INJECTION, SOLUTION INTRAVENOUS at 10:10

## 2020-10-12 RX ADMIN — PHENYLEPHRINE HYDROCHLORIDE 100 MCG: 10 INJECTION INTRAVENOUS at 11:10

## 2020-10-12 RX ADMIN — PROPOFOL 150 MG: 10 INJECTION, EMULSION INTRAVENOUS at 10:10

## 2020-10-12 RX ADMIN — MIDAZOLAM HYDROCHLORIDE 2 MG: 1 INJECTION, SOLUTION INTRAMUSCULAR; INTRAVENOUS at 10:10

## 2020-10-12 RX ADMIN — Medication 20 MG: at 11:10

## 2020-10-12 RX ADMIN — SUGAMMADEX 200 MG: 100 INJECTION, SOLUTION INTRAVENOUS at 12:10

## 2020-10-12 RX ADMIN — OXYCODONE HYDROCHLORIDE AND ACETAMINOPHEN 1 TABLET: 5; 325 TABLET ORAL at 09:10

## 2020-10-12 RX ADMIN — CEFAZOLIN 2 G: 330 INJECTION, POWDER, FOR SOLUTION INTRAMUSCULAR; INTRAVENOUS at 11:10

## 2020-10-12 RX ADMIN — ROCURONIUM BROMIDE 50 MG: 10 INJECTION, SOLUTION INTRAVENOUS at 10:10

## 2020-10-12 RX ADMIN — HYDROCHLOROTHIAZIDE 25 MG: 25 TABLET ORAL at 08:10

## 2020-10-12 RX ADMIN — ROCURONIUM BROMIDE 10 MG: 10 INJECTION, SOLUTION INTRAVENOUS at 11:10

## 2020-10-12 RX ADMIN — SODIUM CHLORIDE: 0.9 INJECTION, SOLUTION INTRAVENOUS at 10:10

## 2020-10-12 RX ADMIN — PANTOPRAZOLE SODIUM 40 MG: 40 TABLET, DELAYED RELEASE ORAL at 08:10

## 2020-10-12 RX ADMIN — ACETAMINOPHEN 650 MG: 325 TABLET ORAL at 08:10

## 2020-10-12 RX ADMIN — DEXAMETHASONE SODIUM PHOSPHATE 4 MG: 4 INJECTION, SOLUTION INTRAMUSCULAR; INTRAVENOUS at 10:10

## 2020-10-12 RX ADMIN — CEFTRIAXONE 2 G: 2 INJECTION, SOLUTION INTRAVENOUS at 08:10

## 2020-10-12 NOTE — PROGRESS NOTES
"[facility]  General Surgery  Progress Note    Subjective:     History of Present Illness:  Patient is 66 yo F with history of HTN and Graves who presents with 1 month of epigastric and RUQ pain that worsens with meals. She reports first noticing this pain approx 1 month ago, and since then it has been regularly triggered with meals. She describes the pain as a sharp heaviness that starts in her epigastric region and radiates to her RUQ. The pain lasts about 30 minutes and then resolves. She has thrown up twice now in the last day. She also notes early satiety. She denies any fevers, chills, or history of these pain episodes in past.  Upon arrival to ED her labs were unremarkable, a RUQ US showed cholelithiasis with no signs of cholecystitis and General Surgery was consulted.    She does note previous upper endoscopy in the past, but that was in her home [place] long time [place]. Doesn't recall any mention of peptic ulcers. She also has a positive H. Pylori IgG in 2008, but it isn't clear from the [facility] documents wether she ever received triple therapy.    She denies any intra-abdominal surgical hx, does have hx of "tummy tuck" in past  Denies hx of MI, CVA, anticoagulation      Post-Op Info:  Procedure(s) (LRB):  EGD (ESOPHAGOGASTRODUODENOSCOPY) (N/A)   3 Days Post-Op     Interval History:   NAEON  Having some abdominal pain but feels better.    Medications:  Continuous Infusions:  Scheduled Meds:   cefTRIAXone (ROCEPHIN) IVPB  2 g Intravenous Q24H    enoxaparin  40 mg Subcutaneous Q24H    fluticasone propionate  1 spray Each Nostril Daily    hydroCHLOROthiazide  25 mg Oral BID    methIMAzole  5 mg Oral Daily    pantoprazole  40 mg Oral Daily     PRN Meds:acetaminophen, albuterol, melatonin, ondansetron, oxyCODONE-acetaminophen, sodium chloride 0.9%     Review of patient's allergies indicates:  No Known Allergies  Objective:     Vital Signs (Most Recent):  Temp: 98.9 °F (37.2 °C) (10/12/20 " 0723)  Pulse: 64 (10/12/20 0723)  Resp: 18 (10/12/20 0723)  BP: 124/74 (10/12/20 0723)  SpO2: 97 % (10/12/20 0723) Vital Signs (24h Range):  Temp:  [98.2 °F (36.8 °C)-99 °F (37.2 °C)] 98.9 °F (37.2 °C)  Pulse:  [58-80] 64  Resp:  [17-22] 18  SpO2:  [95 %-98 %] 97 %  BP: (106-142)/(67-95) 124/74     Weight: 87.9 kg (193 lb 12.6 oz)  Body mass index is 33.49 kg/m².    Intake/Output - Last 3 Shifts       10/10 0700 - 10/11 0659 10/11 0700 - 10/12 0659 10/12 0700 - 10/13 0659    P.O. 1620 1200     I.V. (mL/kg)       Total Intake(mL/kg) 1620 (18.4) 1200 (13.7)     Net +1620 +1200            Urine Occurrence 3 x 4 x     Stool Occurrence  1 x           Physical Exam  Constitutional:       General: She is not in acute distress.     Appearance: She is well-developed.   Cardiovascular:      Rate and Rhythm: Normal rate and regular rhythm.   Pulmonary:      Effort: Pulmonary effort is normal. No respiratory distress.   Abdominal:      General: There is no distension.      Palpations: Abdomen is soft.      Tenderness: There is no abdominal tenderness.   Skin:     General: Skin is warm and dry.   Neurological:      Mental Status: She is alert and oriented to person, place, and time.   Psychiatric:         Behavior: Behavior normal.         Significant Labs:  CBC:   Recent Labs   Lab 10/12/20  0405   WBC 4.84   RBC 4.74   HGB 13.4   HCT 41.9      MCV 88   MCH 28.3   MCHC 32.0     CMP:   Recent Labs   Lab 10/12/20  0405      CALCIUM 9.1   ALBUMIN 3.4*   PROT 6.5      K 3.7   CO2 28      BUN 11   CREATININE 0.7   ALKPHOS 68   ALT 19   AST 16   BILITOT 0.3       Significant Diagnostics:  I have reviewed all pertinent imaging results/findings within the past 24 hours.    Assessment/Plan:     * Abdominal pain  Patient is 66 yo F with biliary colic vs symptomatic peptic ulcer disease. Known cholelithiasis, but no signs of cholecystitis.    Lap marlene today  EGD 10/9 normal - follow up h pylori  biopsies  Continue regular diet, NPO at midnight  GI cocktail  Protonix  Home meds  Lovenox            Lupe Gibson MD  General Surgery  [facility]

## 2020-10-12 NOTE — ASSESSMENT & PLAN NOTE
Patient is 66 yo F with biliary colic vs symptomatic peptic ulcer disease. Known cholelithiasis, but no signs of cholecystitis.    Lap marlene today  EGD 10/9 normal - follow up h pylori biopsies  Continue regular diet, NPO at midnight  GI cocktail  Protonix  Home meds  Lovenox

## 2020-10-12 NOTE — ANESTHESIA POSTPROCEDURE EVALUATION
Anesthesia Post Evaluation    Patient: Priscilla Lim    Procedure(s) Performed: Procedure(s) (LRB):  CHOLECYSTECTOMY, LAPAROSCOPIC (N/A)    Final Anesthesia Type: general    Patient location during evaluation: PACU  Patient participation: Yes- Able to Participate  Level of consciousness: awake and alert  Post-procedure vital signs: reviewed and stable  Pain management: adequate  Airway patency: patent    PONV status at discharge: No PONV  Anesthetic complications: no      Cardiovascular status: blood pressure returned to baseline and stable  Respiratory status: unassisted  Hydration status: euvolemic  Follow-up not needed.          Vitals Value Taken Time   /77 10/12/20 1402   Temp 36.6 °C (97.8 °F) 10/12/20 1248   Pulse 58 10/12/20 1406   Resp 29 10/12/20 1406   SpO2 97 % 10/12/20 1406   Vitals shown include unvalidated device data.      No case tracking events are documented in the log.      Pain/Reji Score: Pain Rating Prior to Med Admin: 5 (10/12/2020  1:56 PM)  Reji Score: 9 (10/12/2020  1:53 PM)        
Detail Level: Zone
Detail Level: Detailed

## 2020-10-12 NOTE — SUBJECTIVE & OBJECTIVE
Interval History:   NAEON  Having some abdominal pain but feels better.    Medications:  Continuous Infusions:  Scheduled Meds:   cefTRIAXone (ROCEPHIN) IVPB  2 g Intravenous Q24H    enoxaparin  40 mg Subcutaneous Q24H    fluticasone propionate  1 spray Each Nostril Daily    hydroCHLOROthiazide  25 mg Oral BID    methIMAzole  5 mg Oral Daily    pantoprazole  40 mg Oral Daily     PRN Meds:acetaminophen, albuterol, melatonin, ondansetron, oxyCODONE-acetaminophen, sodium chloride 0.9%     Review of patient's allergies indicates:  No Known Allergies  Objective:     Vital Signs (Most Recent):  Temp: 98.9 °F (37.2 °C) (10/12/20 0723)  Pulse: 64 (10/12/20 0723)  Resp: 18 (10/12/20 0723)  BP: 124/74 (10/12/20 0723)  SpO2: 97 % (10/12/20 0723) Vital Signs (24h Range):  Temp:  [98.2 °F (36.8 °C)-99 °F (37.2 °C)] 98.9 °F (37.2 °C)  Pulse:  [58-80] 64  Resp:  [17-22] 18  SpO2:  [95 %-98 %] 97 %  BP: (106-142)/(67-95) 124/74     Weight: 87.9 kg (193 lb 12.6 oz)  Body mass index is 33.49 kg/m².    Intake/Output - Last 3 Shifts       10/10 0700 - 10/11 0659 10/11 0700 - 10/12 0659 10/12 0700 - 10/13 0659    P.O. 1620 1200     I.V. (mL/kg)       Total Intake(mL/kg) 1620 (18.4) 1200 (13.7)     Net +1620 +1200            Urine Occurrence 3 x 4 x     Stool Occurrence  1 x           Physical Exam  Constitutional:       General: She is not in acute distress.     Appearance: She is well-developed.   Cardiovascular:      Rate and Rhythm: Normal rate and regular rhythm.   Pulmonary:      Effort: Pulmonary effort is normal. No respiratory distress.   Abdominal:      General: There is no distension.      Palpations: Abdomen is soft.      Tenderness: There is no abdominal tenderness.   Skin:     General: Skin is warm and dry.   Neurological:      Mental Status: She is alert and oriented to person, place, and time.   Psychiatric:         Behavior: Behavior normal.         Significant Labs:  CBC:   Recent Labs   Lab 10/12/20  0405   WBC  4.84   RBC 4.74   HGB 13.4   HCT 41.9      MCV 88   MCH 28.3   MCHC 32.0     CMP:   Recent Labs   Lab 10/12/20  0405      CALCIUM 9.1   ALBUMIN 3.4*   PROT 6.5      K 3.7   CO2 28      BUN 11   CREATININE 0.7   ALKPHOS 68   ALT 19   AST 16   BILITOT 0.3       Significant Diagnostics:  I have reviewed all pertinent imaging results/findings within the past 24 hours.

## 2020-10-12 NOTE — ANESTHESIA PREPROCEDURE EVALUATION
[facility]  Anesthesia Pre-Operative Evaluation         Patient Name: rPiscilla Lim  YOB: 1955  MRN: [id number]    SUBJECTIVE:     Pre-operative evaluation for Procedure(s) (LRB):  CHOLECYSTECTOMY, LAPAROSCOPIC (N/A)     10/11/2020    Priscilla Lim is a 65 y.o. female w/ a significant PMHx of HTN, asthma, hyperthyroidism 2/2 Graves disease presents with with biliary colic vs symptomatic peptic ulcer disease. Known cholelithiasis, but no signs of cholecystitis. Plan for lap marlene tomorrow.    Patient now presents for the above procedure(s).    Consent obtained via medical  through language line.     LDA:        Peripheral IV - Single Lumen 10/09/20 20 G Right Antecubital (Active)   Site Assessment Clean;Dry;Intact;No swelling;No redness 10/11/20 0809   Line Status Saline locked 10/11/20 0809   Dressing Status Clean;Dry;Intact 10/11/20 0809   Dressing Intervention Integrity maintained 10/09/20 1800   Reason Not Rotated Not due 10/09/20 1800   Number of days: 2     Previous airway: none documented    Drips: None.      Patient Active Problem List   Diagnosis    Primary hyperthyroidism    HTN (hypertension)    Multiple thyroid nodules    Mixed hyperlipidemia    Exophthalmos    Left eye pain    Degeneration of lumbar or lumbosacral intervertebral disc    Venous insufficiency of both lower extremities    Graves' orbitopathy    Abdominal pain       Review of patient's allergies indicates:  No Known Allergies    Current Inpatient Medications:   cefTRIAXone (ROCEPHIN) IVPB  2 g Intravenous Q24H    enoxaparin  40 mg Subcutaneous Q24H    fluticasone propionate  1 spray Each Nostril Daily    hydroCHLOROthiazide  25 mg Oral BID    methIMAzole  5 mg Oral Daily    pantoprazole  40 mg Oral Daily       No current facility-administered medications on file  prior to encounter.      Current Outpatient Medications on File Prior to Encounter   Medication Sig Dispense Refill    albuterol (PROVENTIL/VENTOLIN HFA) 90 mcg/actuation inhaler Inhale 2 puffs into the lungs every 4 (four) hours as needed for Wheezing or Shortness of Breath. Rescue 18 g 0    clotrimazole (LOTRIMIN) 1 % cream       fluticasone propionate (FLONASE) 50 mcg/actuation nasal spray 1 spray (50 mcg total) by Each Nostril route once daily. 18.2 mL 0    gabapentin (NEURONTIN) 100 MG capsule Take 1 capsule (100 mg total) by mouth every evening. 30 capsule 2    hydroCHLOROthiazide (HYDRODIURIL) 25 MG tablet TAKE TWO TABLETS BY MOUTH EVERY  tablet 0    hydroCHLOROthiazide (HYDRODIURIL) 25 MG tablet Take 1 tablet (25 mg total) by mouth 2 (two) times daily. 60 tablet 2    LORazepam (ATIVAN) 0.5 MG tablet Ata 1 tableta michael o dos veces por jorge dusty necestie para ansiedad. 30 tablet 2    methIMAzole (TAPAZOLE) 5 MG Tab Take 1.5 tablets daily (7.5 mg) 120 tablet 3    MULTIVIT-IRON-MIN-FOLIC ACID 3,500-18-0.4 UNIT-MG-MG ORAL CHEW Take by mouth.      traMADol (ULTRAM) 50 mg tablet Take 1 tablet (50 mg total) by mouth every 12 (twelve) hours as needed for Pain. (Patient not taking: Reported on 10/24/2019) 60 tablet 1       Past Surgical History:   Procedure Laterality Date    COLONOSCOPY      ESOPHAGOGASTRODUODENOSCOPY         Social History     Socioeconomic History    Marital status:      Spouse name: Not on file    Number of children: Not on file    Years of education: Not on file    Highest education level: Not on file   Occupational History    Not on file   Social Needs    Financial resource strain: Not on file    Food insecurity     Worry: Not on file     Inability: Not on file    Transportation needs     Medical: Not on file     Non-medical: Not on file   Tobacco Use    Smoking status: Former Smoker     Packs/day: 0.10     Years: 2.00     Pack years: 0.20     Quit date:  7/17/2010     Years since quitting: 10.2    Smokeless tobacco: Former User    Tobacco comment: ex-social smoker   Substance and Sexual Activity    Alcohol use: Yes     Comment: socially    Drug use: No    Sexual activity: Yes     Partners: Male     Birth control/protection: Post-menopausal   Lifestyle    Physical activity     Days per week: Not on file     Minutes per session: Not on file    Stress: Not on file   Relationships    Social connections     Talks on phone: Not on file     Gets together: Not on file     Attends Congregational service: Not on file     Active member of club or organization: Not on file     Attends meetings of clubs or organizations: Not on file     Relationship status: Not on file   Other Topics Concern    Not on file   Social History Narrative    Not on file       OBJECTIVE:     Vital Signs Range (Last 24H):  Temp:  [36.8 °C (98.2 °F)-37.1 °C (98.8 °F)]   Pulse:  [61-80]   Resp:  [13-22]   BP: (104-142)/(67-95)   SpO2:  [95 %-98 %]       Significant Labs:  Lab Results   Component Value Date    WBC 4.31 10/11/2020    HGB 13.1 10/11/2020    HCT 40.3 10/11/2020     10/11/2020    CHOL 264 (H) 07/22/2020    TRIG 155 (H) 07/22/2020    HDL 52 07/22/2020    ALT 17 10/11/2020    AST 14 10/11/2020     10/11/2020    K 3.5 10/11/2020     10/11/2020    CREATININE 0.7 10/11/2020    BUN 12 10/11/2020    CO2 27 10/11/2020    TSH 1.006 07/22/2020    INR 1.3 (H) 09/22/2010    HGBA1C 5.6 05/01/2018         EKG:   NSR    2D ECHO:  TTE:  No results found for this or any previous visit.    ASSESSMENT/PLAN:         Anesthesia Evaluation    I have reviewed the Patient Summary Reports.    I have reviewed the Nursing Notes. I have reviewed the NPO Status.   I have reviewed the Medications.     Review of Systems  Anesthesia Hx:  History of prior surgery of interest to airway management or planning: Denies Family Hx of Anesthesia complications.   Denies Personal Hx of Anesthesia complications.    Cardiovascular:   Exercise tolerance: good Hypertension Denies MI.   Denies CABG/stent.       Functional Capacity 4 METS    Pulmonary:   Denies COPD. Asthma asymptomatic  Denies Sleep Apnea.    Renal/:   Denies Chronic Renal Disease.     Musculoskeletal:   Arthritis     Neurological:   Denies TIA. Denies CVA. Denies Seizures.    Endocrine:   Hyperthyroidism        Physical Exam  General:  Obesity    Airway/Jaw/Neck:  Airway Findings: Mouth Opening: Normal Tongue: Normal  General Airway Assessment: Adult  Mallampati: II  TM Distance: Normal, at least 6 cm  Jaw/Neck Findings:  Neck ROM: Normal ROM     Eyes/Ears/Nose:  EYES/EARS/NOSE FINDINGS: Normal   Dental:  Dental Findings:    Chest/Lungs:  Chest/Lungs Findings: Clear to auscultation     Heart/Vascular:  Heart Findings: Rate: Normal  Rhythm: Regular Rhythm  Sounds: Normal  Heart murmur: negative       Mental Status:  Mental Status Findings:  Cooperative, Alert and Oriented         Anesthesia Plan  Type of Anesthesia, risks & benefits discussed:  Anesthesia Type:  general  Patient's Preference:   Intra-op Monitoring Plan: standard ASA monitors  Intra-op Monitoring Plan Comments:   Post Op Pain Control Plan: multimodal analgesia, IV/PO Opioids PRN and per primary service following discharge from PACU  Post Op Pain Control Plan Comments:   Induction:   IV  Beta Blocker:         Informed Consent: Patient understands risks and agrees with Anesthesia plan.  Questions answered. Anesthesia consent signed with patient.  ASA Score: 3     Day of Surgery Review of History & Physical:    H&P update referred to the surgeon.         Ready For Surgery From Anesthesia Perspective.

## 2020-10-12 NOTE — PROGRESS NOTES
Pt arrive to floor via stretcher escorted by staff. VSS. AAO x 4. Bed in lowest position.Call light within reach.

## 2020-10-12 NOTE — ANESTHESIA PROCEDURE NOTES
Intubation  Performed by: Belkys Tan CRNA  Authorized by: Joel Rios MD     Intubation:     Induction:  Intravenous    Intubated:  Postinduction    Mask Ventilation:  Easy mask    Attempts:  1    Attempted By:  CRNA    Method of Intubation:  Direct    Blade:  Avery 2    Laryngeal View Grade: Grade I - full view of chords      Difficult Airway Encountered?: No      Complications:  None    Airway Device:  Oral endotracheal tube    Airway Device Size:  7.0    Style/Cuff Inflation:  Cuffed (inflated to minimal occlusive pressure)    Inflation Amount (mL):  6    Tube secured:  21    Secured at:  The lips    Placement Verified By:  Capnometry    Complicating Factors:  None    Findings Post-Intubation:  BS equal bilateral and atraumatic/condition of teeth unchanged

## 2020-10-12 NOTE — INTERVAL H&P NOTE
The patient has been examined and the H&P has been reviewed:    I concur with the findings and no changes have occurred since H&P was written.    Surgery risks, benefits and alternative options discussed and understood by patient/family.          Active Hospital Problems    Diagnosis  POA    *Abdominal pain [R10.9]  Yes      Resolved Hospital Problems   No resolved problems to display.

## 2020-10-12 NOTE — TRANSFER OF CARE
"Anesthesia Transfer of Care Note    Patient: Priscilla Lim    Procedure(s) Performed: Procedure(s) (LRB):  CHOLECYSTECTOMY, LAPAROSCOPIC (N/A)    Patient location: PACU    Anesthesia Type: general    Transport from OR: Transported from OR on 6-10 L/min O2 by face mask with adequate spontaneous ventilation    Post pain: adequate analgesia    Post assessment: no apparent anesthetic complications and tolerated procedure well    Post vital signs: stable    Level of consciousness: awake, alert and oriented    Nausea/Vomiting: no nausea/vomiting    Complications: none    Transfer of care protocol was followed      Last vitals:   Visit Vitals  BP (!) 161/93 (BP Location: Right arm, Patient Position: Lying)   Pulse 78   Temp 36.7 °C (98 °F) (Oral)   Resp 20   Ht 5' 3" (1.6 m)   Wt 87.5 kg (193 lb)   LMP  (LMP Unknown)   SpO2 99%   Breastfeeding No   BMI 34.19 kg/m²     "

## 2020-10-12 NOTE — OP NOTE
[facility]  General Surgery  Operative Note    DATE OF PROCEDURE: 10/12/2020     PREOPERATIVE DIAGNOSIS: Acute cholecystitis    POSTOPERATIVE DIAGNOSIS: Acute cholecystitis    PROCEDURE PERFORMED: Laparoscopic cholecystectomy.     ATTENDING SURGEON: Carina Gooden M.D.     HOUSESTAFF SURGEON: Korina Cruz M.D. (RES)     ANESTHESIA: General endotracheal.     ESTIMATED BLOOD LOSS: 10 mL.     FINDINGS: Cholelithiasis and mild inflammation.     SPECIMEN: Gallbladder.     DRAINS: None.     COMPLICATIONS: None.     INDICATIONS: Priscilla Lim is a 65 y.o. female who was admitted with a history of postprandial right upper quadrant abdominal pain as well as worsening epigastric pain. The pain became so severe that she went to the ER. US of the abdomen revealed distended gallbladder but no findings of acute cholecystitis. She had a history of H pylori for which GI did an upper endoscopy and did not see any abnormalities. Her pain worsened and CT abd/pelvis revealed findings consistent with acute cholecystitis. We recommended laparoscopic cholecystectomy and the patient agreed to proceed. The patient signed informed consent and expressed understanding of the risks and benefits of surgery.     OPERATIVE PROCEDURE: The patient was identified in Preoperative Holding and brought back to the Operating Room. Placed supine on the operating table and padded appropriately. Monitors were applied and there was smooth induction of general endotracheal anesthesia. The patient's abdomen was prepped and draped in the standard sterile surgical fashion. A time-out was performed and all team members present agreed this was the correct procedure on the correct patient. We also confirmed administration of appropriate preoperative antibiotics.    An appropriate site was chosen at the right lateral position from the umbilicus. The skin and subcutaneous tissues were injection with 0.25% Marcaine. A 5mm incision was  made and using a 5-mm scope, the abdomen was safely entered using Optiview. The abdomen was then insufflated with carbon dioxide to a maximum pressure of 15 mmHg. A 5-mm laparoscope was placed and the abdomen was examined. There was no evidence of injury from the initial trocar placement. Two 5-mm trocars were placed under direct vision through separate stab incisions, both in the right upper quadrant. A 12-mm trocar was placed in the subxiphoid position. We directed our attention to the right upper quadrant. The gallbladder was identified and noted to have  mild inflammatory change as well as distention. The fundus was grasped and retracted cranially and the infundibulum was grasped and retracted laterally. We bluntly dissected the peritoneal reflection off the infundibulum and neck of the gallbladder. With careful blunt dissection in this area, we were able to identify the cystic duct. Further careful dissection identified the cystic artery and we did obtain a critical view of safety. Both duct and artery were triply clipped and divided. The gallbladder was dissected off the gallbladder fossa using Bovie electrocautery from infundibulum to fundus until free. We did get into the gallbladder and all bile and stones were removed. The gallbladder was placed into an EndoCatch bag and removed from the subxiphoid port site with no difficulty. We then reexamined the right upper quadrant. The gallbladder fossa was examined and all further bleeding was stopped with electrocautery. There was no bile leak noted. The clips on the cystic duct and artery were examined and no bleeding or bile leak were noted. The right upper quadrant was irrigated with saline until the returning effluent was clear. Using the Terence-Arriaza closure device, the fascial incision at the subxiphoid port site was closed with a 0 Vicryl stitch.  All ports were removed under direct vision and no bleeding from any port site was noted.  The insufflation of  the abdomen was then evacuated and the laparoscope was removed. All port sites were infiltrated with Marcaine and closed in a subcuticular fashion. Sterile dressings were applied. The patient was extubated in the Operating Room and transported to the Recovery Room in stable condition. All sponge, instrument and needle counts were correct at the end of the case. I was present and scrubbed for the entire procedure.

## 2020-10-12 NOTE — NURSING TRANSFER
Nursing Transfer Note      10/12/2020     Transfer to 528 from PACU    Transfer via stretcher    Transfer with ViSi monitor    Transported by     Medicines sent:     Chart send with patient: Yes    Notified: daughter

## 2020-10-13 VITALS
OXYGEN SATURATION: 95 % | HEART RATE: 64 BPM | WEIGHT: 193 LBS | TEMPERATURE: 98 F | BODY MASS INDEX: 34.2 KG/M2 | DIASTOLIC BLOOD PRESSURE: 70 MMHG | SYSTOLIC BLOOD PRESSURE: 122 MMHG | HEIGHT: 63 IN | RESPIRATION RATE: 18 BRPM

## 2020-10-13 PROBLEM — R10.9 ABDOMINAL PAIN: Status: RESOLVED | Noted: 2020-10-09 | Resolved: 2020-10-13

## 2020-10-13 PROBLEM — E87.6 HYPOKALEMIA: Status: ACTIVE | Noted: 2020-10-13

## 2020-10-13 PROBLEM — E87.6 HYPOKALEMIA: Status: RESOLVED | Noted: 2020-10-13 | Resolved: 2020-10-13

## 2020-10-13 LAB
ALBUMIN SERPL BCP-MCNC: 3.4 G/DL (ref 3.5–5.2)
ALP SERPL-CCNC: 70 U/L (ref 55–135)
ALT SERPL W/O P-5'-P-CCNC: 55 U/L (ref 10–44)
ANION GAP SERPL CALC-SCNC: 12 MMOL/L (ref 8–16)
AST SERPL-CCNC: 45 U/L (ref 10–40)
BASOPHILS # BLD AUTO: 0.02 K/UL (ref 0–0.2)
BASOPHILS NFR BLD: 0.3 % (ref 0–1.9)
BILIRUB SERPL-MCNC: 0.4 MG/DL (ref 0.1–1)
BUN SERPL-MCNC: 9 MG/DL (ref 8–23)
CALCIUM SERPL-MCNC: 8.8 MG/DL (ref 8.7–10.5)
CHLORIDE SERPL-SCNC: 99 MMOL/L (ref 95–110)
CO2 SERPL-SCNC: 25 MMOL/L (ref 23–29)
CREAT SERPL-MCNC: 0.6 MG/DL (ref 0.5–1.4)
DIFFERENTIAL METHOD: NORMAL
EOSINOPHIL # BLD AUTO: 0 K/UL (ref 0–0.5)
EOSINOPHIL NFR BLD: 0.6 % (ref 0–8)
ERYTHROCYTE [DISTWIDTH] IN BLOOD BY AUTOMATED COUNT: 13.5 % (ref 11.5–14.5)
EST. GFR  (AFRICAN AMERICAN): >60 ML/MIN/1.73 M^2
EST. GFR  (NON AFRICAN AMERICAN): >60 ML/MIN/1.73 M^2
GLUCOSE SERPL-MCNC: 98 MG/DL (ref 70–110)
HCT VFR BLD AUTO: 39.8 % (ref 37–48.5)
HGB BLD-MCNC: 13 G/DL (ref 12–16)
IMM GRANULOCYTES # BLD AUTO: 0.02 K/UL (ref 0–0.04)
IMM GRANULOCYTES NFR BLD AUTO: 0.3 % (ref 0–0.5)
LYMPHOCYTES # BLD AUTO: 1.9 K/UL (ref 1–4.8)
LYMPHOCYTES NFR BLD: 27.3 % (ref 18–48)
MAGNESIUM SERPL-MCNC: 1.8 MG/DL (ref 1.6–2.6)
MCH RBC QN AUTO: 28.7 PG (ref 27–31)
MCHC RBC AUTO-ENTMCNC: 32.7 G/DL (ref 32–36)
MCV RBC AUTO: 88 FL (ref 82–98)
MONOCYTES # BLD AUTO: 0.6 K/UL (ref 0.3–1)
MONOCYTES NFR BLD: 8.7 % (ref 4–15)
NEUTROPHILS # BLD AUTO: 4.3 K/UL (ref 1.8–7.7)
NEUTROPHILS NFR BLD: 62.8 % (ref 38–73)
NRBC BLD-RTO: 0 /100 WBC
PHOSPHATE SERPL-MCNC: 3.9 MG/DL (ref 2.7–4.5)
PLATELET # BLD AUTO: 238 K/UL (ref 150–350)
PMV BLD AUTO: 10.6 FL (ref 9.2–12.9)
POTASSIUM SERPL-SCNC: 3 MMOL/L (ref 3.5–5.1)
PROT SERPL-MCNC: 6.3 G/DL (ref 6–8.4)
RBC # BLD AUTO: 4.53 M/UL (ref 4–5.4)
SODIUM SERPL-SCNC: 136 MMOL/L (ref 136–145)
WBC # BLD AUTO: 6.81 K/UL (ref 3.9–12.7)

## 2020-10-13 PROCEDURE — 80053 COMPREHEN METABOLIC PANEL: CPT

## 2020-10-13 PROCEDURE — 83735 ASSAY OF MAGNESIUM: CPT

## 2020-10-13 PROCEDURE — G0378 HOSPITAL OBSERVATION PER HR: HCPCS

## 2020-10-13 PROCEDURE — 25000003 PHARM REV CODE 250: Performed by: STUDENT IN AN ORGANIZED HEALTH CARE EDUCATION/TRAINING PROGRAM

## 2020-10-13 PROCEDURE — 85025 COMPLETE CBC W/AUTO DIFF WBC: CPT

## 2020-10-13 PROCEDURE — 36415 COLL VENOUS BLD VENIPUNCTURE: CPT

## 2020-10-13 PROCEDURE — 84100 ASSAY OF PHOSPHORUS: CPT

## 2020-10-13 RX ORDER — POTASSIUM CHLORIDE 20 MEQ/1
60 TABLET, EXTENDED RELEASE ORAL ONCE
Status: DISCONTINUED | OUTPATIENT
Start: 2020-10-13 | End: 2020-10-13

## 2020-10-13 RX ORDER — OXYCODONE AND ACETAMINOPHEN 5; 325 MG/1; MG/1
1 TABLET ORAL EVERY 6 HOURS PRN
Qty: 20 TABLET | Refills: 0 | Status: SHIPPED | OUTPATIENT
Start: 2020-10-13 | End: 2021-05-04

## 2020-10-13 RX ADMIN — POTASSIUM BICARBONATE 50 MEQ: 978 TABLET, EFFERVESCENT ORAL at 08:10

## 2020-10-13 RX ADMIN — HYDROCHLOROTHIAZIDE 25 MG: 25 TABLET ORAL at 08:10

## 2020-10-13 RX ADMIN — PANTOPRAZOLE SODIUM 40 MG: 40 TABLET, DELAYED RELEASE ORAL at 08:10

## 2020-10-13 RX ADMIN — OXYCODONE HYDROCHLORIDE AND ACETAMINOPHEN 1 TABLET: 5; 325 TABLET ORAL at 06:10

## 2020-10-13 RX ADMIN — OXYCODONE HYDROCHLORIDE AND ACETAMINOPHEN 1 TABLET: 5; 325 TABLET ORAL at 02:10

## 2020-10-13 NOTE — NURSING
Pt d/c home per MD order. IV removed and catheter tip intact. VSS. Meds delivered to pt bedside. Pt left via wheelchair escorted by staff.

## 2020-10-13 NOTE — SUBJECTIVE & OBJECTIVE
Interval History: Now s/p lap marlene. Doing well this morning. Pain is tolerable. Clear diet going well. No nausea or emesis.    Medications:  Continuous Infusions:  Scheduled Meds:   enoxaparin  40 mg Subcutaneous Q24H    fluticasone propionate  1 spray Each Nostril Daily    hydroCHLOROthiazide  25 mg Oral BID    methIMAzole  5 mg Oral Daily    pantoprazole  40 mg Oral Daily    potassium bicarbonate  50 mEq Oral Once     PRN Meds:albuterol, melatonin, ondansetron, oxyCODONE-acetaminophen, sodium chloride 0.9%, sodium chloride 0.9%     Review of patient's allergies indicates:  No Known Allergies  Objective:     Vital Signs (Most Recent):  Temp: 98 °F (36.7 °C) (10/13/20 0712)  Pulse: 64 (10/13/20 0624)  Resp: 18 (10/13/20 0712)  BP: 122/70 (10/13/20 0712)  SpO2: 95 % (10/13/20 0712) Vital Signs (24h Range):  Temp:  [97.5 °F (36.4 °C)-98.2 °F (36.8 °C)] 98 °F (36.7 °C)  Pulse:  [55-92] 64  Resp:  [12-20] 18  SpO2:  [93 %-100 %] 95 %  BP: (106-161)/(62-93) 122/70     Weight: 87.5 kg (193 lb)  Body mass index is 34.19 kg/m².    Intake/Output - Last 3 Shifts       10/11 0700 - 10/12 0659 10/12 0700 - 10/13 0659 10/13 0700 - 10/14 0659    P.O. 1200  250    I.V. (mL/kg)  1300 (14.9)     Total Intake(mL/kg) 1200 (13.7) 1300 (14.9) 250 (2.9)    Net +1200 +1300 +250           Urine Occurrence 4 x  1 x    Stool Occurrence 1 x  0 x    Emesis Occurrence   0 x          Physical Exam  Constitutional:       Appearance: Normal appearance. She is not ill-appearing or toxic-appearing.   HENT:      Head: Normocephalic and atraumatic.   Eyes:      General: Vision grossly intact.      Extraocular Movements: Extraocular movements intact.   Cardiovascular:      Rate and Rhythm: Normal rate and regular rhythm.      Heart sounds: Normal heart sounds. No murmur.   Pulmonary:      Effort: Pulmonary effort is normal.      Breath sounds: Normal breath sounds.   Abdominal:      General: Abdomen is flat. Bowel sounds are normal. There is no  distension.      Palpations: Abdomen is soft. There is no fluid wave.      Tenderness: There is abdominal tenderness.      Comments: Laparoscopic incisions are clean/dry/intact and well-approximated. Appropriate incisional tenderness.   Musculoskeletal:         General: No tenderness or deformity.   Skin:     General: Skin is warm and dry.      Coloration: Skin is not cyanotic or jaundiced.         Significant Labs:  CBC:   Recent Labs   Lab 10/13/20  0508   WBC 6.81   RBC 4.53   HGB 13.0   HCT 39.8      MCV 88   MCH 28.7   MCHC 32.7     BMP:   Recent Labs   Lab 10/13/20  0508   GLU 98      K 3.0*   CL 99   CO2 25   BUN 9   CREATININE 0.6   CALCIUM 8.8   MG 1.8       Significant Diagnostics:  None

## 2020-10-13 NOTE — DISCHARGE SUMMARY
"[facility]  General Surgery  Discharge Summary      Patient Name: Priscilla Lim  MRN: [id number]  Admission Date: 10/8/2020  Hospital Length of Stay: 0 days  Discharge Date and Time:  10/13/2020 8:51 AM  Attending Physician: Carina Gooden MD   Discharging Provider: Korina Cruz MD  Primary Care Provider: Yandel Batista MD     HPI: Patient is 66 yo F with history of HTN and Graves who presents with 1 month of epigastric and RUQ pain that worsens with meals. She reports first noticing this pain approx 1 month ago, and since then it has been regularly triggered with meals. She describes the pain as a sharp heaviness that starts in her epigastric region and radiates to her RUQ. The pain lasts about 30 minutes and then resolves. She has thrown up twice now in the last day. She also notes early satiety. She denies any fevers, chills, or history of these pain episodes in past.  Upon arrival to ED her labs were unremarkable, a RUQ US showed cholelithiasis with no signs of cholecystitis and General Surgery was consulted.     She does note previous upper endoscopy in the past, but that was in [place]. Doesn't recall any mention of peptic ulcers. She also has a positive H. Pylori IgG in 2008, but it isn't clear from the [facility] documents wether she ever received triple therapy.     She denies any intra-abdominal surgical hx, does have hx of "tummy tuck" in past  Denies hx of MI, CVA, anticoagulation       Procedure(s) (LRB):  CHOLECYSTECTOMY, LAPAROSCOPIC (N/A)     Hospital Course: Patient was admitted for evaluation of epigastric pain. She underwent upper endoscopy with GI on 10/9/20 for rule out of peptic ulcer disease. Scope normal; biopsies taken to rule out h pylori, path pending. H pylori stool antigen sent; results pending. However, on CT abdomen/pelvis on 10/10 there was gallbladder wall thickening and mild mesenteric stranding, in addition to known gallstones. " So the decision was made to take the patient to surgery for cholecystectomy. She tolerated the procedure well. For full details, see op note. Her diet was slowly advanced and her RUQ/epigastric pain resolved. Labs remained within normal limits and she was deemed fit for d/c home on 10/13. Follow up in two weeks.     Consults:   Consults (From admission, onward)        Status Ordering Provider     Inpatient consult to Gastroenterology  Once     Provider:  (Not yet assigned)    Completed MONISHA MILLS     Inpatient consult to General Surgery  Once     Provider:  (Not yet assigned)    Completed TITO STRICKLAND          Significant Diagnostic Studies:   Upper endoscopy: no acute findings    Pending Diagnostic Studies:     Procedure Component Value Units Date/Time    H. pylori antigen, stool [[id number]] Collected: 10/11/20 0711    Order Status: Sent Lab Status: In process Updated: 10/11/20 0749    Specimen: Stool     Specimen to Pathology, Surgery Gastrointestinal tract [[id number]] Collected: 10/09/20 1645    Order Status: Sent Lab Status: In process Updated: 10/10/20 1141    Specimen to Pathology, Surgery General Surgery [[id number]] Collected: 10/12/20 1222    Order Status: Sent Lab Status: In process Updated: 10/12/20 1319        Final Active Diagnoses:      Problems Resolved During this Admission:    Diagnosis Date Noted Date Resolved POA    PRINCIPAL PROBLEM:  Abdominal pain [R10.9] 10/09/2020 10/13/2020 Yes    Hypokalemia [E87.6] 10/13/2020 10/13/2020 Yes    Calculus of gallbladder with cholecystitis without biliary obstruction [K80.10]  10/13/2020 Yes      Discharged Condition: good    Disposition: Home or Self Care    Follow Up:  Follow-up Information     Carina Gooden MD In 2 weeks.    Specialty: General Surgery  Why: post op lap marlene  Contact information:  [address] [facility] [id number] [phone]                 Patient Instructions:      No driving until:   Order Comments:  While taking narcotic pain medications.     Notify your health care provider if you experience any of the following:  temperature >100.4     Notify your health care provider if you experience any of the following:  persistent nausea and vomiting or diarrhea     Notify your health care provider if you experience any of the following:  severe uncontrolled pain     Notify your health care provider if you experience any of the following:  redness, tenderness, or signs of infection (pain, swelling, redness, odor or green/yellow discharge around incision site)     No dressing needed     Activity as tolerated   Order Comments: - Ok to shower tomorrow 10/14. No submersion of surgical wounds (bath, pool, hot tub, etc) for two weeks.  - No heavy lifting greater than 10 pounds for 6 weeks to prevent hernia     Medications:  Reconciled Home Medications:      Medication List      START taking these medications    oxyCODONE-acetaminophen 5-325 mg per tablet  Commonly known as: PERCOCET  Take 1 tablet by mouth every 6 (six) hours as needed for Pain.        CONTINUE taking these medications    albuterol 90 mcg/actuation inhaler  Commonly known as: PROVENTIL/VENTOLIN HFA  Inhale 2 puffs into the lungs every 4 (four) hours as needed for Wheezing or Shortness of Breath. Rescue     CENTRUM 3,500-18-0.4 unit-mg-mg Chew  Generic drug: multivit-iron-min-folic acid  Take by mouth.     clotrimazole 1 % cream  Commonly known as: LOTRIMIN     fluticasone propionate 50 mcg/actuation nasal spray  Commonly known as: FLONASE  1 spray (50 mcg total) by Each Nostril route once daily.     gabapentin 100 MG capsule  Commonly known as: NEURONTIN  Take 1 capsule (100 mg total) by mouth every evening.     * hydroCHLOROthiazide 25 MG tablet  Commonly known as: HYDRODIURIL  TAKE TWO TABLETS BY MOUTH EVERY DAY     * hydroCHLOROthiazide 25 MG tablet  Commonly known as: HYDRODIURIL  Take 1 tablet (25 mg total) by mouth 2 (two) times daily.     LORazepam 0.5  MG tablet  Commonly known as: ATIVAN  Ata 1 tableta michael o dos veces por jorge dusty necestie para ansiedad.     methIMAzole 5 MG Tab  Commonly known as: TAPAZOLE  Take 1.5 tablets daily (7.5 mg)     traMADoL 50 mg tablet  Commonly known as: ULTRAM  Take 1 tablet (50 mg total) by mouth every 12 (twelve) hours as needed for Pain.         * This list has 2 medication(s) that are the same as other medications prescribed for you. Read the directions carefully, and ask your doctor or other care provider to review them with you.                Korina Cruz MD  General Surgery  [facility]

## 2020-10-13 NOTE — PLAN OF CARE
Pt AAO x 4. Ate 75% of breakfast. No c/o nausea/vomiting. Q2h rounds done. Plan of care reviewed with family and patient.

## 2020-10-13 NOTE — ASSESSMENT & PLAN NOTE
-s/p lap cholecystectomy  -Diet advanced to regular  -Continue DVT prophylaxis  -Home meds  -Antibiotics discontinued  -Stable for discharge home today

## 2020-10-13 NOTE — PROGRESS NOTES
"[facility]  General Surgery  Progress Note    Subjective:     History of Present Illness:  Patient is 64 yo F with history of HTN and Graves who presents with 1 month of epigastric and RUQ pain that worsens with meals. She reports first noticing this pain approx 1 month ago, and since then it has been regularly triggered with meals. She describes the pain as a sharp heaviness that starts in her epigastric region and radiates to her RUQ. The pain lasts about 30 minutes and then resolves. She has thrown up twice now in the last day. She also notes early satiety. She denies any fevers, chills, or history of these pain episodes in past.  Upon arrival to ED her labs were unremarkable, a RUQ US showed cholelithiasis with no signs of cholecystitis and General Surgery was consulted.    She does note previous upper endoscopy in the past, but that was in her [place] long [place]. Doesn't recall any mention of peptic ulcers. She also has a positive H. Pylori IgG in 2008, but it isn't clear from the [facility] documents wether she ever received triple therapy.    She denies any intra-abdominal surgical hx, does have hx of "tummy tuck" in past  Denies hx of MI, CVA, anticoagulation      Post-Op Info:  Procedure(s) (LRB):  CHOLECYSTECTOMY, LAPAROSCOPIC (N/A)   1 Day Post-Op     Interval History: Now s/p lap marlene. Doing well this morning. Pain is tolerable. Clear diet going well. No nausea or emesis.    Medications:  Continuous Infusions:  Scheduled Meds:   enoxaparin  40 mg Subcutaneous Q24H    fluticasone propionate  1 spray Each Nostril Daily    hydroCHLOROthiazide  25 mg Oral BID    methIMAzole  5 mg Oral Daily    pantoprazole  40 mg Oral Daily    potassium bicarbonate  50 mEq Oral Once     PRN Meds:albuterol, melatonin, ondansetron, oxyCODONE-acetaminophen, sodium chloride 0.9%, sodium chloride 0.9%     Review of patient's allergies indicates:  No Known Allergies  Objective:     Vital Signs " (Most Recent):  Temp: 98 °F (36.7 °C) (10/13/20 0712)  Pulse: 64 (10/13/20 0624)  Resp: 18 (10/13/20 0712)  BP: 122/70 (10/13/20 0712)  SpO2: 95 % (10/13/20 0712) Vital Signs (24h Range):  Temp:  [97.5 °F (36.4 °C)-98.2 °F (36.8 °C)] 98 °F (36.7 °C)  Pulse:  [55-92] 64  Resp:  [12-20] 18  SpO2:  [93 %-100 %] 95 %  BP: (106-161)/(62-93) 122/70     Weight: 87.5 kg (193 lb)  Body mass index is 34.19 kg/m².    Intake/Output - Last 3 Shifts       10/11 0700 - 10/12 0659 10/12 0700 - 10/13 0659 10/13 0700 - 10/14 0659    P.O. 1200  250    I.V. (mL/kg)  1300 (14.9)     Total Intake(mL/kg) 1200 (13.7) 1300 (14.9) 250 (2.9)    Net +1200 +1300 +250           Urine Occurrence 4 x  1 x    Stool Occurrence 1 x  0 x    Emesis Occurrence   0 x          Physical Exam  Constitutional:       Appearance: Normal appearance. She is not ill-appearing or toxic-appearing.   HENT:      Head: Normocephalic and atraumatic.   Eyes:      General: Vision grossly intact.      Extraocular Movements: Extraocular movements intact.   Cardiovascular:      Rate and Rhythm: Normal rate and regular rhythm.      Heart sounds: Normal heart sounds. No murmur.   Pulmonary:      Effort: Pulmonary effort is normal.      Breath sounds: Normal breath sounds.   Abdominal:      General: Abdomen is flat. Bowel sounds are normal. There is no distension.      Palpations: Abdomen is soft. There is no fluid wave.      Tenderness: There is abdominal tenderness.      Comments: Laparoscopic incisions are clean/dry/intact and well-approximated. Appropriate incisional tenderness.   Musculoskeletal:         General: No tenderness or deformity.   Skin:     General: Skin is warm and dry.      Coloration: Skin is not cyanotic or jaundiced.         Significant Labs:  CBC:   Recent Labs   Lab 10/13/20  0508   WBC 6.81   RBC 4.53   HGB 13.0   HCT 39.8      MCV 88   MCH 28.7   MCHC 32.7     BMP:   Recent Labs   Lab 10/13/20  8868   GLU 98      K 3.0*   CL 99   CO2 25    BUN 9   CREATININE 0.6   CALCIUM 8.8   MG 1.8       Significant Diagnostics:  None    Assessment/Plan:     * Abdominal pain  -S/p EGD with antral biopsies. H pylori still pending  -Abdominal pain improved today    Hypokalemia  -Replaced this morning with potassium bicarb    Calculus of gallbladder with cholecystitis without biliary obstruction  -s/p lap cholecystectomy  -Diet advanced to regular  -Continue DVT prophylaxis  -Home meds  -Antibiotics discontinued  -Stable for discharge home today        Carina Gooden MD  General Surgery  [facility]

## 2020-10-14 LAB
FINAL PATHOLOGIC DIAGNOSIS: NORMAL
GROSS: NORMAL
Lab: NORMAL
SUPPLEMENTAL DIAGNOSIS: NORMAL

## 2020-10-14 NOTE — PLAN OF CARE
10/14/20 1627   Final Note   Assessment Type Final Discharge Note   Anticipated Discharge Disposition Home   What phone number can be called within the next 1-3 days to see how you are doing after discharge? [phone]   Hospital Follow Up  Appt(s) scheduled? Yes   Discharge plans and expectations educations in teach back method with documentation complete? Yes     Requested 2 week post hospital appointment with Dr. VIRY Gooden. Clinic to call patient with appointment date and time.

## 2020-10-15 ENCOUNTER — TELEPHONE (OUTPATIENT)
Dept: SURGERY | Facility: HOSPITAL | Age: 65
End: 2020-10-15

## 2020-10-15 DIAGNOSIS — A04.8 H. PYLORI INFECTION: Primary | ICD-10-CM

## 2020-10-15 RX ORDER — CLARITHROMYCIN 500 MG/1
500 TABLET, FILM COATED ORAL EVERY 12 HOURS
Qty: 28 TABLET | Refills: 0 | Status: SHIPPED | OUTPATIENT
Start: 2020-10-15 | End: 2020-10-29

## 2020-10-15 RX ORDER — PANTOPRAZOLE SODIUM 20 MG/1
40 TABLET, DELAYED RELEASE ORAL
Qty: 120 TABLET | Refills: 11 | Status: SHIPPED | OUTPATIENT
Start: 2020-10-15 | End: 2021-05-04

## 2020-10-15 RX ORDER — AMOXICILLIN 500 MG/1
500 TABLET, FILM COATED ORAL EVERY 12 HOURS
Qty: 28 TABLET | Refills: 0 | Status: SHIPPED | OUTPATIENT
Start: 2020-10-15 | End: 2020-10-29

## 2020-10-15 NOTE — TELEPHONE ENCOUNTER
Attempted to call patient with antral biopsy results which revealed H. Pylori. Will attempt to call again later. Will send meds to her pharmacy for treatment.

## 2020-10-18 LAB — H PYLORI AG STL QL IA: DETECTED

## 2020-10-22 LAB
FINAL PATHOLOGIC DIAGNOSIS: NORMAL
Lab: NORMAL

## 2020-10-23 ENCOUNTER — OFFICE VISIT (OUTPATIENT)
Dept: SURGERY | Facility: CLINIC | Age: 65
End: 2020-10-23
Payer: MEDICAID

## 2020-10-23 VITALS
SYSTOLIC BLOOD PRESSURE: 143 MMHG | DIASTOLIC BLOOD PRESSURE: 75 MMHG | TEMPERATURE: 98 F | WEIGHT: 191.94 LBS | BODY MASS INDEX: 34.01 KG/M2 | HEIGHT: 63 IN | HEART RATE: 90 BPM

## 2020-10-23 DIAGNOSIS — Z90.49 S/P LAPAROSCOPIC CHOLECYSTECTOMY: ICD-10-CM

## 2020-10-23 DIAGNOSIS — A04.8 H. PYLORI INFECTION: Primary | ICD-10-CM

## 2020-10-23 PROCEDURE — 99999 PR PBB SHADOW E&M-EST. PATIENT-LVL III: ICD-10-PCS | Mod: PBBFAC,,, | Performed by: STUDENT IN AN ORGANIZED HEALTH CARE EDUCATION/TRAINING PROGRAM

## 2020-10-23 PROCEDURE — 99999 PR PBB SHADOW E&M-EST. PATIENT-LVL III: CPT | Mod: PBBFAC,,, | Performed by: STUDENT IN AN ORGANIZED HEALTH CARE EDUCATION/TRAINING PROGRAM

## 2020-10-23 PROCEDURE — 99024 PR POST-OP FOLLOW-UP VISIT: ICD-10-PCS | Mod: ,,, | Performed by: STUDENT IN AN ORGANIZED HEALTH CARE EDUCATION/TRAINING PROGRAM

## 2020-10-23 PROCEDURE — 99024 POSTOP FOLLOW-UP VISIT: CPT | Mod: ,,, | Performed by: STUDENT IN AN ORGANIZED HEALTH CARE EDUCATION/TRAINING PROGRAM

## 2020-10-23 PROCEDURE — 99213 OFFICE O/P EST LOW 20 MIN: CPT | Mod: PBBFAC | Performed by: STUDENT IN AN ORGANIZED HEALTH CARE EDUCATION/TRAINING PROGRAM

## 2020-10-23 NOTE — PROGRESS NOTES
"[facility]  General Surgery  Post-operative    Subjective:       Priscilla Lim presents to the clinic 2 weeks following laparoscopic cholecystectomy. She is eating a regular diet without difficulty. She has some intermittent epigastric pain but just started her H pylori triple therapy regimen. Bowel movements are Normal.  Pain is controlled without any medications. She had a few questions about her diet and her activity level.     Objective:      BP (!) 143/75 (BP Location: Right arm, Patient Position: Sitting)   Pulse 90   Temp 98.3 °F (36.8 °C)   Ht 5' 3" (1.6 m)   Wt 87.1 kg (191 lb 14.6 oz)   LMP  (LMP Unknown)   BMI 34.00 kg/m²     General:  alert, appears stated age and cooperative   Abdomen: soft, bowel sounds active, non-tender   Incision:   healing well, no drainage, no erythema, no hernia, no seroma, no swelling, no dehiscence, incision well approximated       Assessment:           Plan:     1. Continue H pylori triple therapy; will re-check in one month.  2. Reviewed antibiotics sent to pharmacy and need to complete 14 days of treatment for H pylori.  3. Wound care discussed.  4. Return to full duty in 4 weeks.  5. Will discuss results of H pylori test after treatment      Carina Gooden MD  General Surgery and Surgical Critical Care  [facility]  "

## 2020-10-29 DIAGNOSIS — Z03.818 ENCOUNTER FOR OBSERVATION FOR SUSPECTED EXPOSURE TO OTHER BIOLOGICAL AGENTS RULED OUT: ICD-10-CM

## 2020-12-05 ENCOUNTER — PATIENT OUTREACH (OUTPATIENT)
Dept: ADMINISTRATIVE | Facility: OTHER | Age: 65
End: 2020-12-05

## 2020-12-05 DIAGNOSIS — Z12.11 SCREENING FOR COLON CANCER: Primary | ICD-10-CM

## 2020-12-05 DIAGNOSIS — Z12.31 BREAST CANCER SCREENING BY MAMMOGRAM: ICD-10-CM

## 2020-12-05 NOTE — PROGRESS NOTES
Health Maintenance Due   Topic Date Due    Hepatitis C Screening  1955    HIV Screening  01/24/1971    TETANUS VACCINE  01/24/1974    Shingles Vaccine (1 of 2) 01/24/2006    Colorectal Cancer Screening  11/03/2010    Mammogram  10/10/2020     Updates were requested from care everywhere.  Chart was reviewed for overdue [facility]active [facility]ters ([facility]) topics (CRS, Breast Cancer Screening, Eye exam)  Health Maintenance has been updated.  LINKS immunization registry triggered.  Immunizations were reconciled.  Mammo w/CAD and Fit Kit ordered

## 2020-12-07 ENCOUNTER — TELEPHONE (OUTPATIENT)
Dept: INTERNAL MEDICINE | Facility: CLINIC | Age: 65
End: 2020-12-07

## 2020-12-07 DIAGNOSIS — E05.90 PRIMARY HYPERTHYROIDISM: Primary | ICD-10-CM

## 2020-12-08 ENCOUNTER — OFFICE VISIT (OUTPATIENT)
Dept: OPHTHALMOLOGY | Facility: CLINIC | Age: 65
End: 2020-12-08
Payer: MEDICAID

## 2020-12-08 DIAGNOSIS — E05.00 GRAVES' ORBITOPATHY: Primary | ICD-10-CM

## 2020-12-08 DIAGNOSIS — H04.123 INSUFFICIENCY OF TEAR FILM OF BOTH EYES: ICD-10-CM

## 2020-12-08 PROCEDURE — 92014 PR EYE EXAM, EST PATIENT,COMPREHESV: ICD-10-PCS | Mod: S$PBB,,, | Performed by: OPHTHALMOLOGY

## 2020-12-08 PROCEDURE — 99999 PR PBB SHADOW E&M-EST. PATIENT-LVL III: ICD-10-PCS | Mod: PBBFAC,,, | Performed by: OPHTHALMOLOGY

## 2020-12-08 PROCEDURE — 99213 OFFICE O/P EST LOW 20 MIN: CPT | Mod: PBBFAC | Performed by: OPHTHALMOLOGY

## 2020-12-08 PROCEDURE — 99999 PR PBB SHADOW E&M-EST. PATIENT-LVL III: CPT | Mod: PBBFAC,,, | Performed by: OPHTHALMOLOGY

## 2020-12-08 PROCEDURE — 92014 COMPRE OPH EXAM EST PT 1/>: CPT | Mod: S$PBB,,, | Performed by: OPHTHALMOLOGY

## 2020-12-08 NOTE — PROGRESS NOTES
HPI     Concerns About Ocular Health      Additional comments: Graves' orbitopathy              Comments     DLS:10/24/2019 [facility]  present([place])  Patient here for annual check up for Graves Orbitopathy.  OU vision blurry at times. Using readers only.    Eye drops:Refresh QD OU    I have personally interviewed the patient, reviewed the history and   examined the patient and agree with the technician's exam.            Last edited by Carlos Horowitz MD on 12/8/2020  9:08 AM. (History)            Assessment /Plan     For exam results, see Encounter Report.    Graves' orbitopathy    Insufficiency of tear film of both eyes      I found no risk for optic neuropathy or corneal ulceration. I recommended increasing the artificial tears to four times daily with an ointment or gell at bedtime. Repeat eye exam in one year or sooner if requested.

## 2021-01-20 ENCOUNTER — LAB VISIT (OUTPATIENT)
Dept: PRIMARY CARE CLINIC | Facility: OTHER | Age: 66
End: 2021-01-20
Attending: INTERNAL MEDICINE
Payer: MEDICARE

## 2021-01-20 ENCOUNTER — TELEPHONE (OUTPATIENT)
Dept: OBSTETRICS AND GYNECOLOGY | Facility: CLINIC | Age: 66
End: 2021-01-20

## 2021-01-20 DIAGNOSIS — R51.9 HEAD ACHE: ICD-10-CM

## 2021-01-20 PROCEDURE — U0003 INFECTIOUS AGENT DETECTION BY NUCLEIC ACID (DNA OR RNA); SEVERE ACUTE RESPIRATORY SYNDROME CORONAVIRUS 2 (SARS-COV-2) (CORONAVIRUS DISEASE [COVID-19]), AMPLIFIED PROBE TECHNIQUE, MAKING USE OF HIGH THROUGHPUT TECHNOLOGIES AS DESCRIBED BY CMS-2020-01-R: HCPCS

## 2021-01-21 LAB — SARS-COV-2 RNA RESP QL NAA+PROBE: NOT DETECTED

## 2021-01-28 ENCOUNTER — PATIENT OUTREACH (OUTPATIENT)
Dept: ADMINISTRATIVE | Facility: OTHER | Age: 66
End: 2021-01-28

## 2021-01-29 ENCOUNTER — PATIENT MESSAGE (OUTPATIENT)
Dept: ADMINISTRATIVE | Facility: HOSPITAL | Age: 66
End: 2021-01-29

## 2021-02-08 ENCOUNTER — OFFICE VISIT (OUTPATIENT)
Dept: OPTOMETRY | Facility: CLINIC | Age: 66
End: 2021-02-08
Payer: MEDICARE

## 2021-02-08 DIAGNOSIS — H52.03 HYPEROPIA WITH ASTIGMATISM AND PRESBYOPIA, BILATERAL: Primary | ICD-10-CM

## 2021-02-08 DIAGNOSIS — H52.203 HYPEROPIA WITH ASTIGMATISM AND PRESBYOPIA, BILATERAL: Primary | ICD-10-CM

## 2021-02-08 DIAGNOSIS — H52.4 HYPEROPIA WITH ASTIGMATISM AND PRESBYOPIA, BILATERAL: Primary | ICD-10-CM

## 2021-02-08 PROCEDURE — 99999 PR PBB SHADOW E&M-EST. PATIENT-LVL III: CPT | Mod: PBBFAC,,, | Performed by: OPTOMETRIST

## 2021-02-08 PROCEDURE — 99999 PR PBB SHADOW E&M-EST. PATIENT-LVL III: ICD-10-PCS | Mod: PBBFAC,,, | Performed by: OPTOMETRIST

## 2021-02-08 PROCEDURE — 92015 PR REFRACTION: ICD-10-PCS | Mod: ,,, | Performed by: OPTOMETRIST

## 2021-02-08 PROCEDURE — 99213 OFFICE O/P EST LOW 20 MIN: CPT | Mod: PBBFAC | Performed by: OPTOMETRIST

## 2021-02-08 PROCEDURE — 92015 DETERMINE REFRACTIVE STATE: CPT | Mod: ,,, | Performed by: OPTOMETRIST

## 2021-02-10 ENCOUNTER — LAB VISIT (OUTPATIENT)
Dept: LAB | Facility: HOSPITAL | Age: 66
End: 2021-02-10
Attending: INTERNAL MEDICINE
Payer: MEDICARE

## 2021-02-10 DIAGNOSIS — E05.90 PRIMARY HYPERTHYROIDISM: ICD-10-CM

## 2021-02-10 DIAGNOSIS — E05.00 GRAVES' ORBITOPATHY: ICD-10-CM

## 2021-02-10 DIAGNOSIS — I10 ESSENTIAL HYPERTENSION: ICD-10-CM

## 2021-02-10 DIAGNOSIS — E55.9 VITAMIN D DEFICIENCY: ICD-10-CM

## 2021-02-10 DIAGNOSIS — I10 ESSENTIAL HYPERTENSION: Primary | ICD-10-CM

## 2021-02-10 DIAGNOSIS — E78.2 MIXED HYPERLIPIDEMIA: ICD-10-CM

## 2021-02-10 LAB
25(OH)D3+25(OH)D2 SERPL-MCNC: 42 NG/ML (ref 30–96)
ALBUMIN SERPL BCP-MCNC: 3.7 G/DL (ref 3.5–5.2)
ALP SERPL-CCNC: 61 U/L (ref 55–135)
ALT SERPL W/O P-5'-P-CCNC: 17 U/L (ref 10–44)
ANION GAP SERPL CALC-SCNC: 7 MMOL/L (ref 8–16)
AST SERPL-CCNC: 15 U/L (ref 10–40)
BASOPHILS # BLD AUTO: 0.05 K/UL (ref 0–0.2)
BASOPHILS NFR BLD: 1.2 % (ref 0–1.9)
BILIRUB SERPL-MCNC: 0.5 MG/DL (ref 0.1–1)
BUN SERPL-MCNC: 10 MG/DL (ref 8–23)
CALCIUM SERPL-MCNC: 8.9 MG/DL (ref 8.7–10.5)
CHLORIDE SERPL-SCNC: 104 MMOL/L (ref 95–110)
CHOLEST SERPL-MCNC: 219 MG/DL (ref 120–199)
CHOLEST/HDLC SERPL: 4.5 {RATIO} (ref 2–5)
CO2 SERPL-SCNC: 29 MMOL/L (ref 23–29)
CREAT SERPL-MCNC: 0.7 MG/DL (ref 0.5–1.4)
DIFFERENTIAL METHOD: NORMAL
EOSINOPHIL # BLD AUTO: 0.1 K/UL (ref 0–0.5)
EOSINOPHIL NFR BLD: 3.5 % (ref 0–8)
ERYTHROCYTE [DISTWIDTH] IN BLOOD BY AUTOMATED COUNT: 13.8 % (ref 11.5–14.5)
EST. GFR  (AFRICAN AMERICAN): >60 ML/MIN/1.73 M^2
EST. GFR  (NON AFRICAN AMERICAN): >60 ML/MIN/1.73 M^2
ESTIMATED AVG GLUCOSE: 117 MG/DL (ref 68–131)
GLUCOSE SERPL-MCNC: 92 MG/DL (ref 70–110)
HBA1C MFR BLD: 5.7 % (ref 4–5.6)
HCT VFR BLD AUTO: 42.5 % (ref 37–48.5)
HDLC SERPL-MCNC: 49 MG/DL (ref 40–75)
HDLC SERPL: 22.4 % (ref 20–50)
HGB BLD-MCNC: 13.7 G/DL (ref 12–16)
IMM GRANULOCYTES # BLD AUTO: 0.01 K/UL (ref 0–0.04)
IMM GRANULOCYTES NFR BLD AUTO: 0.2 % (ref 0–0.5)
LDLC SERPL CALC-MCNC: 142.8 MG/DL (ref 63–159)
LYMPHOCYTES # BLD AUTO: 1.6 K/UL (ref 1–4.8)
LYMPHOCYTES NFR BLD: 39.4 % (ref 18–48)
MCH RBC QN AUTO: 28.2 PG (ref 27–31)
MCHC RBC AUTO-ENTMCNC: 32.2 G/DL (ref 32–36)
MCV RBC AUTO: 88 FL (ref 82–98)
MONOCYTES # BLD AUTO: 0.4 K/UL (ref 0.3–1)
MONOCYTES NFR BLD: 8.9 % (ref 4–15)
NEUTROPHILS # BLD AUTO: 1.9 K/UL (ref 1.8–7.7)
NEUTROPHILS NFR BLD: 46.8 % (ref 38–73)
NONHDLC SERPL-MCNC: 170 MG/DL
NRBC BLD-RTO: 0 /100 WBC
PLATELET # BLD AUTO: 252 K/UL (ref 150–350)
PMV BLD AUTO: 10.4 FL (ref 9.2–12.9)
POTASSIUM SERPL-SCNC: 3.5 MMOL/L (ref 3.5–5.1)
PROT SERPL-MCNC: 6.8 G/DL (ref 6–8.4)
RBC # BLD AUTO: 4.85 M/UL (ref 4–5.4)
SODIUM SERPL-SCNC: 140 MMOL/L (ref 136–145)
T4 FREE SERPL-MCNC: 1.07 NG/DL (ref 0.71–1.51)
TRIGL SERPL-MCNC: 136 MG/DL (ref 30–150)
TSH SERPL DL<=0.005 MIU/L-ACNC: 0.89 UIU/ML (ref 0.4–4)
WBC # BLD AUTO: 4.04 K/UL (ref 3.9–12.7)

## 2021-02-10 PROCEDURE — 84443 ASSAY THYROID STIM HORMONE: CPT

## 2021-02-10 PROCEDURE — 82306 VITAMIN D 25 HYDROXY: CPT

## 2021-02-10 PROCEDURE — 36415 COLL VENOUS BLD VENIPUNCTURE: CPT

## 2021-02-10 PROCEDURE — 80053 COMPREHEN METABOLIC PANEL: CPT

## 2021-02-10 PROCEDURE — 84439 ASSAY OF FREE THYROXINE: CPT

## 2021-02-10 PROCEDURE — 85025 COMPLETE CBC W/AUTO DIFF WBC: CPT

## 2021-02-10 PROCEDURE — 83036 HEMOGLOBIN GLYCOSYLATED A1C: CPT

## 2021-02-10 PROCEDURE — 80061 LIPID PANEL: CPT

## 2021-02-17 DIAGNOSIS — Z12.31 BREAST CANCER SCREENING BY MAMMOGRAM: Primary | ICD-10-CM

## 2021-02-24 ENCOUNTER — IMMUNIZATION (OUTPATIENT)
Dept: INTERNAL MEDICINE | Facility: CLINIC | Age: 66
End: 2021-02-24
Payer: MEDICARE

## 2021-02-24 DIAGNOSIS — Z23 NEED FOR VACCINATION: Primary | ICD-10-CM

## 2021-02-24 PROCEDURE — 91300 COVID-19, MRNA, LNP-S, PF, 30 MCG/0.3 ML DOSE VACCINE: CPT | Mod: PBBFAC | Performed by: INTERNAL MEDICINE

## 2021-02-25 ENCOUNTER — HOSPITAL ENCOUNTER (OUTPATIENT)
Dept: RADIOLOGY | Facility: HOSPITAL | Age: 66
Discharge: HOME | End: 2021-02-25
Attending: INTERNAL MEDICINE
Payer: MEDICARE

## 2021-02-25 VITALS — BODY MASS INDEX: 32.77 KG/M2 | WEIGHT: 185 LBS

## 2021-02-25 DIAGNOSIS — Z12.31 BREAST CANCER SCREENING BY MAMMOGRAM: ICD-10-CM

## 2021-02-25 PROCEDURE — 77067 SCR MAMMO BI INCL CAD: CPT | Mod: 26,,, | Performed by: RADIOLOGY

## 2021-02-25 PROCEDURE — 77063 MAMMO DIGITAL SCREENING BILAT WITH TOMO: ICD-10-PCS | Mod: 26,,, | Performed by: RADIOLOGY

## 2021-02-25 PROCEDURE — 77067 SCR MAMMO BI INCL CAD: CPT | Mod: TC

## 2021-02-25 PROCEDURE — 77067 MAMMO DIGITAL SCREENING BILAT WITH TOMO: ICD-10-PCS | Mod: 26,,, | Performed by: RADIOLOGY

## 2021-02-25 PROCEDURE — 77063 BREAST TOMOSYNTHESIS BI: CPT | Mod: 26,,, | Performed by: RADIOLOGY

## 2021-03-17 ENCOUNTER — IMMUNIZATION (OUTPATIENT)
Dept: INTERNAL MEDICINE | Facility: CLINIC | Age: 66
End: 2021-03-17
Payer: MEDICARE

## 2021-03-17 DIAGNOSIS — Z23 NEED FOR VACCINATION: Primary | ICD-10-CM

## 2021-03-17 PROCEDURE — 91300 COVID-19, MRNA, LNP-S, PF, 30 MCG/0.3 ML DOSE VACCINE: CPT | Mod: HCNC,PBBFAC | Performed by: INTERNAL MEDICINE

## 2021-03-17 PROCEDURE — 0002A COVID-19, MRNA, LNP-S, PF, 30 MCG/0.3 ML DOSE VACCINE: CPT | Mod: HCNC,PBBFAC | Performed by: INTERNAL MEDICINE

## 2021-03-22 ENCOUNTER — OFFICE VISIT (OUTPATIENT)
Dept: INTERNAL MEDICINE | Facility: CLINIC | Age: 66
End: 2021-03-22
Payer: MEDICARE

## 2021-03-22 VITALS
WEIGHT: 187.19 LBS | HEART RATE: 60 BPM | BODY MASS INDEX: 33.16 KG/M2 | DIASTOLIC BLOOD PRESSURE: 88 MMHG | SYSTOLIC BLOOD PRESSURE: 134 MMHG

## 2021-03-22 DIAGNOSIS — E78.2 MIXED HYPERLIPIDEMIA: ICD-10-CM

## 2021-03-22 DIAGNOSIS — I87.2 VENOUS INSUFFICIENCY OF BOTH LOWER EXTREMITIES: ICD-10-CM

## 2021-03-22 DIAGNOSIS — E05.00 GRAVES' ORBITOPATHY: ICD-10-CM

## 2021-03-22 DIAGNOSIS — Z00.00 ROUTINE GENERAL MEDICAL EXAMINATION AT A HEALTH CARE FACILITY: Primary | ICD-10-CM

## 2021-03-22 DIAGNOSIS — I10 ESSENTIAL HYPERTENSION: ICD-10-CM

## 2021-03-22 DIAGNOSIS — M72.2 PLANTAR FASCIITIS, BILATERAL: ICD-10-CM

## 2021-03-22 PROCEDURE — 99999 PR PBB SHADOW E&M-EST. PATIENT-LVL III: CPT | Mod: PBBFAC,,, | Performed by: INTERNAL MEDICINE

## 2021-03-22 PROCEDURE — 99214 PR OFFICE/OUTPT VISIT, EST, LEVL IV, 30-39 MIN: ICD-10-PCS | Mod: S$GLB,,, | Performed by: INTERNAL MEDICINE

## 2021-03-22 PROCEDURE — 99213 OFFICE O/P EST LOW 20 MIN: CPT | Mod: PBBFAC | Performed by: INTERNAL MEDICINE

## 2021-03-22 PROCEDURE — 99999 PR PBB SHADOW E&M-EST. PATIENT-LVL III: ICD-10-PCS | Mod: PBBFAC,,, | Performed by: INTERNAL MEDICINE

## 2021-03-22 PROCEDURE — 99214 OFFICE O/P EST MOD 30 MIN: CPT | Mod: S$GLB,,, | Performed by: INTERNAL MEDICINE

## 2021-04-28 ENCOUNTER — TELEPHONE (OUTPATIENT)
Dept: FAMILY MEDICINE | Facility: CLINIC | Age: 66
End: 2021-04-28

## 2021-05-04 ENCOUNTER — HOSPITAL ENCOUNTER (OUTPATIENT)
Dept: RADIOLOGY | Facility: HOSPITAL | Age: 66
Discharge: HOME | End: 2021-05-04
Attending: INTERNAL MEDICINE
Payer: MEDICARE

## 2021-05-04 ENCOUNTER — PATIENT OUTREACH (OUTPATIENT)
Dept: ADMINISTRATIVE | Facility: OTHER | Age: 66
End: 2021-05-04

## 2021-05-04 ENCOUNTER — OFFICE VISIT (OUTPATIENT)
Dept: FAMILY MEDICINE | Facility: CLINIC | Age: 66
End: 2021-05-04
Payer: MEDICARE

## 2021-05-04 VITALS
WEIGHT: 187.19 LBS | HEIGHT: 63 IN | SYSTOLIC BLOOD PRESSURE: 134 MMHG | HEART RATE: 71 BPM | DIASTOLIC BLOOD PRESSURE: 66 MMHG | BODY MASS INDEX: 33.17 KG/M2 | TEMPERATURE: 98 F | OXYGEN SATURATION: 98 %

## 2021-05-04 DIAGNOSIS — I10 ESSENTIAL HYPERTENSION: Primary | ICD-10-CM

## 2021-05-04 DIAGNOSIS — M25.571 ACUTE RIGHT ANKLE PAIN: ICD-10-CM

## 2021-05-04 DIAGNOSIS — Z78.0 POSTMENOPAUSE: ICD-10-CM

## 2021-05-04 DIAGNOSIS — M25.561 ACUTE PAIN OF RIGHT KNEE: ICD-10-CM

## 2021-05-04 DIAGNOSIS — A04.8 H. PYLORI INFECTION: ICD-10-CM

## 2021-05-04 DIAGNOSIS — Z11.4 ENCOUNTER FOR SCREENING FOR HUMAN IMMUNODEFICIENCY VIRUS (HIV): ICD-10-CM

## 2021-05-04 DIAGNOSIS — Z79.899 MEDICATION MANAGEMENT: ICD-10-CM

## 2021-05-04 DIAGNOSIS — Z11.59 ENCOUNTER FOR HEPATITIS C SCREENING TEST FOR LOW RISK PATIENT: ICD-10-CM

## 2021-05-04 DIAGNOSIS — Z12.11 COLON CANCER SCREENING: ICD-10-CM

## 2021-05-04 DIAGNOSIS — E78.2 MIXED HYPERLIPIDEMIA: ICD-10-CM

## 2021-05-04 DIAGNOSIS — E04.2 MULTIPLE THYROID NODULES: ICD-10-CM

## 2021-05-04 PROCEDURE — 1125F PR PAIN SEVERITY QUANTIFIED, PAIN PRESENT: ICD-10-PCS | Mod: S$GLB,,, | Performed by: INTERNAL MEDICINE

## 2021-05-04 PROCEDURE — 1125F AMNT PAIN NOTED PAIN PRSNT: CPT | Mod: S$GLB,,, | Performed by: INTERNAL MEDICINE

## 2021-05-04 PROCEDURE — 3075F SYST BP GE 130 - 139MM HG: CPT | Mod: CPTII,S$GLB,, | Performed by: INTERNAL MEDICINE

## 2021-05-04 PROCEDURE — 99999 PR PBB SHADOW E&M-EST. PATIENT-LVL IV: CPT | Mod: PBBFAC,,, | Performed by: INTERNAL MEDICINE

## 2021-05-04 PROCEDURE — 3075F PR MOST RECENT SYSTOLIC BLOOD PRESS GE 130-139MM HG: ICD-10-PCS | Mod: CPTII,S$GLB,, | Performed by: INTERNAL MEDICINE

## 2021-05-04 PROCEDURE — 73560 X-RAY EXAM OF KNEE 1 OR 2: CPT | Mod: 26,RT,, | Performed by: RADIOLOGY

## 2021-05-04 PROCEDURE — 3078F DIAST BP <80 MM HG: CPT | Mod: CPTII,S$GLB,, | Performed by: INTERNAL MEDICINE

## 2021-05-04 PROCEDURE — 99499 RISK ADDL DX/OHS AUDIT: ICD-10-PCS | Mod: HCNC,S$GLB,, | Performed by: INTERNAL MEDICINE

## 2021-05-04 PROCEDURE — 73610 X-RAY EXAM OF ANKLE: CPT | Mod: TC,FY,RT

## 2021-05-04 PROCEDURE — 3008F BODY MASS INDEX DOCD: CPT | Mod: CPTII,S$GLB,, | Performed by: INTERNAL MEDICINE

## 2021-05-04 PROCEDURE — 73610 X-RAY EXAM OF ANKLE: CPT | Mod: 26,RT,, | Performed by: RADIOLOGY

## 2021-05-04 PROCEDURE — 3288F FALL RISK ASSESSMENT DOCD: CPT | Mod: CPTII,S$GLB,, | Performed by: INTERNAL MEDICINE

## 2021-05-04 PROCEDURE — 99204 PR OFFICE/OUTPT VISIT, NEW, LEVL IV, 45-59 MIN: ICD-10-PCS | Mod: S$GLB,,, | Performed by: INTERNAL MEDICINE

## 2021-05-04 PROCEDURE — 1101F PT FALLS ASSESS-DOCD LE1/YR: CPT | Mod: CPTII,S$GLB,, | Performed by: INTERNAL MEDICINE

## 2021-05-04 PROCEDURE — 3288F PR FALLS RISK ASSESSMENT DOCUMENTED: ICD-10-PCS | Mod: CPTII,S$GLB,, | Performed by: INTERNAL MEDICINE

## 2021-05-04 PROCEDURE — 99204 OFFICE O/P NEW MOD 45 MIN: CPT | Mod: S$GLB,,, | Performed by: INTERNAL MEDICINE

## 2021-05-04 PROCEDURE — 3078F PR MOST RECENT DIASTOLIC BLOOD PRESSURE < 80 MM HG: ICD-10-PCS | Mod: CPTII,S$GLB,, | Performed by: INTERNAL MEDICINE

## 2021-05-04 PROCEDURE — 3008F PR BODY MASS INDEX (BMI) DOCUMENTED: ICD-10-PCS | Mod: CPTII,S$GLB,, | Performed by: INTERNAL MEDICINE

## 2021-05-04 PROCEDURE — 73560 X-RAY EXAM OF KNEE 1 OR 2: CPT | Mod: TC,FY,RT

## 2021-05-04 PROCEDURE — 99499 UNLISTED E&M SERVICE: CPT | Mod: HCNC,S$GLB,, | Performed by: INTERNAL MEDICINE

## 2021-05-04 PROCEDURE — 73610 XR ANKLE COMPLETE 3 VIEW RIGHT: ICD-10-PCS | Mod: 26,RT,, | Performed by: RADIOLOGY

## 2021-05-04 PROCEDURE — 1101F PR PT FALLS ASSESS DOC 0-1 FALLS W/OUT INJ PAST YR: ICD-10-PCS | Mod: CPTII,S$GLB,, | Performed by: INTERNAL MEDICINE

## 2021-05-04 PROCEDURE — 73560 XR KNEE 1 OR 2 VIEW RIGHT: ICD-10-PCS | Mod: 26,RT,, | Performed by: RADIOLOGY

## 2021-05-04 PROCEDURE — 99999 PR PBB SHADOW E&M-EST. PATIENT-LVL IV: ICD-10-PCS | Mod: PBBFAC,,, | Performed by: INTERNAL MEDICINE

## 2021-05-06 ENCOUNTER — LAB VISIT (OUTPATIENT)
Dept: LAB | Facility: HOSPITAL | Age: 66
End: 2021-05-06
Attending: INTERNAL MEDICINE
Payer: MEDICARE

## 2021-05-06 DIAGNOSIS — M25.561 ACUTE PAIN OF RIGHT KNEE: ICD-10-CM

## 2021-05-06 DIAGNOSIS — E04.2 MULTIPLE THYROID NODULES: ICD-10-CM

## 2021-05-06 DIAGNOSIS — Z11.4 ENCOUNTER FOR SCREENING FOR HUMAN IMMUNODEFICIENCY VIRUS (HIV): ICD-10-CM

## 2021-05-06 DIAGNOSIS — M25.571 ACUTE RIGHT ANKLE PAIN: ICD-10-CM

## 2021-05-06 DIAGNOSIS — Z79.899 MEDICATION MANAGEMENT: ICD-10-CM

## 2021-05-06 DIAGNOSIS — Z11.59 ENCOUNTER FOR HEPATITIS C SCREENING TEST FOR LOW RISK PATIENT: ICD-10-CM

## 2021-05-06 DIAGNOSIS — A04.8 H. PYLORI INFECTION: ICD-10-CM

## 2021-05-06 DIAGNOSIS — I10 ESSENTIAL HYPERTENSION: ICD-10-CM

## 2021-05-06 DIAGNOSIS — E78.2 MIXED HYPERLIPIDEMIA: ICD-10-CM

## 2021-05-06 LAB
25(OH)D3+25(OH)D2 SERPL-MCNC: 41 NG/ML (ref 30–96)
ALBUMIN SERPL BCP-MCNC: 3.9 G/DL (ref 3.5–5.2)
ALP SERPL-CCNC: 64 U/L (ref 55–135)
ALT SERPL W/O P-5'-P-CCNC: 15 U/L (ref 10–44)
ANION GAP SERPL CALC-SCNC: 10 MMOL/L (ref 8–16)
AST SERPL-CCNC: 17 U/L (ref 10–40)
BASOPHILS # BLD AUTO: 0.05 K/UL (ref 0–0.2)
BASOPHILS NFR BLD: 1 % (ref 0–1.9)
BILIRUB SERPL-MCNC: 0.5 MG/DL (ref 0.1–1)
BUN SERPL-MCNC: 6 MG/DL (ref 8–23)
CALCIUM SERPL-MCNC: 8.9 MG/DL (ref 8.7–10.5)
CHLORIDE SERPL-SCNC: 100 MMOL/L (ref 95–110)
CHOLEST SERPL-MCNC: 221 MG/DL (ref 120–199)
CHOLEST/HDLC SERPL: 4.4 {RATIO} (ref 2–5)
CO2 SERPL-SCNC: 27 MMOL/L (ref 23–29)
CREAT SERPL-MCNC: 0.7 MG/DL (ref 0.5–1.4)
DIFFERENTIAL METHOD: NORMAL
EOSINOPHIL # BLD AUTO: 0.2 K/UL (ref 0–0.5)
EOSINOPHIL NFR BLD: 3.1 % (ref 0–8)
ERYTHROCYTE [DISTWIDTH] IN BLOOD BY AUTOMATED COUNT: 13.2 % (ref 11.5–14.5)
EST. GFR  (AFRICAN AMERICAN): >60 ML/MIN/1.73 M^2
EST. GFR  (NON AFRICAN AMERICAN): >60 ML/MIN/1.73 M^2
ESTIMATED AVG GLUCOSE: 114 MG/DL (ref 68–131)
GLUCOSE SERPL-MCNC: 101 MG/DL (ref 70–110)
HBA1C MFR BLD: 5.6 % (ref 4–5.6)
HCT VFR BLD AUTO: 40.7 % (ref 37–48.5)
HDLC SERPL-MCNC: 50 MG/DL (ref 40–75)
HDLC SERPL: 22.6 % (ref 20–50)
HGB BLD-MCNC: 13.7 G/DL (ref 12–16)
IMM GRANULOCYTES # BLD AUTO: 0.01 K/UL (ref 0–0.04)
IMM GRANULOCYTES NFR BLD AUTO: 0.2 % (ref 0–0.5)
LDLC SERPL CALC-MCNC: 145 MG/DL (ref 63–159)
LYMPHOCYTES # BLD AUTO: 1.8 K/UL (ref 1–4.8)
LYMPHOCYTES NFR BLD: 36.6 % (ref 18–48)
MCH RBC QN AUTO: 28.7 PG (ref 27–31)
MCHC RBC AUTO-ENTMCNC: 33.7 G/DL (ref 32–36)
MCV RBC AUTO: 85 FL (ref 82–98)
MONOCYTES # BLD AUTO: 0.5 K/UL (ref 0.3–1)
MONOCYTES NFR BLD: 9.8 % (ref 4–15)
NEUTROPHILS # BLD AUTO: 2.4 K/UL (ref 1.8–7.7)
NEUTROPHILS NFR BLD: 49.3 % (ref 38–73)
NONHDLC SERPL-MCNC: 171 MG/DL
NRBC BLD-RTO: 0 /100 WBC
PLATELET # BLD AUTO: 266 K/UL (ref 150–450)
PMV BLD AUTO: 11 FL (ref 9.2–12.9)
POTASSIUM SERPL-SCNC: 3.3 MMOL/L (ref 3.5–5.1)
PROT SERPL-MCNC: 7.1 G/DL (ref 6–8.4)
RBC # BLD AUTO: 4.77 M/UL (ref 4–5.4)
SODIUM SERPL-SCNC: 137 MMOL/L (ref 136–145)
T4 FREE SERPL-MCNC: 1.03 NG/DL (ref 0.71–1.51)
TRIGL SERPL-MCNC: 130 MG/DL (ref 30–150)
TSH SERPL DL<=0.005 MIU/L-ACNC: 1.43 UIU/ML (ref 0.4–4)
URATE SERPL-MCNC: 4.4 MG/DL (ref 2.4–5.7)
WBC # BLD AUTO: 4.89 K/UL (ref 3.9–12.7)

## 2021-05-06 PROCEDURE — 84550 ASSAY OF BLOOD/URIC ACID: CPT | Performed by: INTERNAL MEDICINE

## 2021-05-06 PROCEDURE — 84439 ASSAY OF FREE THYROXINE: CPT | Performed by: INTERNAL MEDICINE

## 2021-05-06 PROCEDURE — 80053 COMPREHEN METABOLIC PANEL: CPT | Performed by: INTERNAL MEDICINE

## 2021-05-06 PROCEDURE — 86803 HEPATITIS C AB TEST: CPT | Performed by: INTERNAL MEDICINE

## 2021-05-06 PROCEDURE — 87338 HPYLORI STOOL AG IA: CPT | Performed by: INTERNAL MEDICINE

## 2021-05-06 PROCEDURE — 86703 HIV-1/HIV-2 1 RESULT ANTBDY: CPT | Performed by: INTERNAL MEDICINE

## 2021-05-06 PROCEDURE — 80061 LIPID PANEL: CPT | Performed by: INTERNAL MEDICINE

## 2021-05-06 PROCEDURE — 36415 COLL VENOUS BLD VENIPUNCTURE: CPT | Performed by: INTERNAL MEDICINE

## 2021-05-06 PROCEDURE — 82306 VITAMIN D 25 HYDROXY: CPT | Performed by: INTERNAL MEDICINE

## 2021-05-06 PROCEDURE — 85025 COMPLETE CBC W/AUTO DIFF WBC: CPT | Performed by: INTERNAL MEDICINE

## 2021-05-06 PROCEDURE — 84443 ASSAY THYROID STIM HORMONE: CPT | Performed by: INTERNAL MEDICINE

## 2021-05-06 PROCEDURE — 83036 HEMOGLOBIN GLYCOSYLATED A1C: CPT | Performed by: INTERNAL MEDICINE

## 2021-05-07 LAB
HCV AB SERPL QL IA: NEGATIVE
HIV 1+2 AB+HIV1 P24 AG SERPL QL IA: NEGATIVE

## 2021-05-08 ENCOUNTER — PATIENT MESSAGE (OUTPATIENT)
Dept: FAMILY MEDICINE | Facility: CLINIC | Age: 66
End: 2021-05-08

## 2021-05-10 ENCOUNTER — TELEPHONE (OUTPATIENT)
Dept: FAMILY MEDICINE | Facility: CLINIC | Age: 66
End: 2021-05-10

## 2021-05-12 ENCOUNTER — TELEPHONE (OUTPATIENT)
Dept: FAMILY MEDICINE | Facility: CLINIC | Age: 66
End: 2021-05-12

## 2021-05-12 DIAGNOSIS — A04.8 H. PYLORI INFECTION: Primary | ICD-10-CM

## 2021-05-18 LAB — H PYLORI AG STL QL IA: NOT DETECTED

## 2021-05-19 ENCOUNTER — HOSPITAL ENCOUNTER (OUTPATIENT)
Dept: RADIOLOGY | Facility: HOSPITAL | Age: 66
Discharge: HOME | End: 2021-05-19
Attending: INTERNAL MEDICINE
Payer: MEDICARE

## 2021-05-19 DIAGNOSIS — E04.2 MULTIPLE THYROID NODULES: ICD-10-CM

## 2021-05-19 DIAGNOSIS — Z78.0 POSTMENOPAUSE: ICD-10-CM

## 2021-05-19 PROCEDURE — 76536 US EXAM OF HEAD AND NECK: CPT | Mod: TC

## 2021-05-19 PROCEDURE — 77080 DXA BONE DENSITY AXIAL: CPT | Mod: 26,,, | Performed by: RADIOLOGY

## 2021-05-19 PROCEDURE — 76536 US EXAM OF HEAD AND NECK: CPT | Mod: 26,,, | Performed by: INTERNAL MEDICINE

## 2021-05-19 PROCEDURE — 77080 DEXA BONE DENSITY SPINE HIP: ICD-10-PCS | Mod: 26,,, | Performed by: RADIOLOGY

## 2021-05-19 PROCEDURE — 77080 DXA BONE DENSITY AXIAL: CPT | Mod: TC

## 2021-05-19 PROCEDURE — 76536 US SOFT TISSUE HEAD NECK THYROID: ICD-10-PCS | Mod: 26,,, | Performed by: INTERNAL MEDICINE

## 2021-05-20 ENCOUNTER — PATIENT MESSAGE (OUTPATIENT)
Dept: FAMILY MEDICINE | Facility: CLINIC | Age: 66
End: 2021-05-20

## 2021-05-21 ENCOUNTER — CLINICAL SUPPORT (OUTPATIENT)
Dept: URGENT CARE | Facility: CLINIC | Age: 66
End: 2021-05-21
Payer: MEDICARE

## 2021-05-21 DIAGNOSIS — Z11.52 ENCOUNTER FOR SCREENING LABORATORY TESTING FOR COVID-19 VIRUS IN ASYMPTOMATIC PATIENT: Primary | ICD-10-CM

## 2021-05-21 DIAGNOSIS — Z01.812 ENCOUNTER FOR SCREENING LABORATORY TESTING FOR COVID-19 VIRUS IN ASYMPTOMATIC PATIENT: Primary | ICD-10-CM

## 2021-05-21 PROCEDURE — U0003 INFECTIOUS AGENT DETECTION BY NUCLEIC ACID (DNA OR RNA); SEVERE ACUTE RESPIRATORY SYNDROME CORONAVIRUS 2 (SARS-COV-2) (CORONAVIRUS DISEASE [COVID-19]), AMPLIFIED PROBE TECHNIQUE, MAKING USE OF HIGH THROUGHPUT TECHNOLOGIES AS DESCRIBED BY CMS-2020-01-R: HCPCS | Performed by: NURSE PRACTITIONER

## 2021-05-21 PROCEDURE — U0005 INFEC AGEN DETEC AMPLI PROBE: HCPCS | Performed by: NURSE PRACTITIONER

## 2021-05-22 ENCOUNTER — PATIENT MESSAGE (OUTPATIENT)
Dept: FAMILY MEDICINE | Facility: CLINIC | Age: 66
End: 2021-05-22

## 2021-05-22 LAB — SARS-COV-2 RNA RESP QL NAA+PROBE: NOT DETECTED

## 2021-05-22 RX ORDER — LYSINE HCL 500 MG
1 TABLET ORAL 2 TIMES DAILY
Qty: 60 TABLET | Refills: 11 | Status: SHIPPED | OUTPATIENT
Start: 2021-05-22

## 2021-06-02 ENCOUNTER — TELEPHONE (OUTPATIENT)
Dept: FAMILY MEDICINE | Facility: CLINIC | Age: 66
End: 2021-06-02

## 2021-07-27 ENCOUNTER — TELEPHONE (OUTPATIENT)
Dept: FAMILY MEDICINE | Facility: CLINIC | Age: 66
End: 2021-07-27

## 2021-07-28 ENCOUNTER — OFFICE VISIT (OUTPATIENT)
Dept: FAMILY MEDICINE | Facility: CLINIC | Age: 66
End: 2021-07-28
Payer: MEDICARE

## 2021-07-28 VITALS
WEIGHT: 188.69 LBS | BODY MASS INDEX: 33.43 KG/M2 | HEART RATE: 75 BPM | OXYGEN SATURATION: 95 % | SYSTOLIC BLOOD PRESSURE: 110 MMHG | HEIGHT: 63 IN | DIASTOLIC BLOOD PRESSURE: 68 MMHG | TEMPERATURE: 98 F

## 2021-07-28 DIAGNOSIS — M25.571 ACUTE RIGHT ANKLE PAIN: ICD-10-CM

## 2021-07-28 DIAGNOSIS — E05.90 PRIMARY HYPERTHYROIDISM: ICD-10-CM

## 2021-07-28 DIAGNOSIS — Z79.899 MEDICATION MANAGEMENT: ICD-10-CM

## 2021-07-28 DIAGNOSIS — I10 ESSENTIAL HYPERTENSION: ICD-10-CM

## 2021-07-28 DIAGNOSIS — I83.813 VARICOSE VEINS OF BOTH LOWER EXTREMITIES WITH PAIN: ICD-10-CM

## 2021-07-28 DIAGNOSIS — M25.561 ACUTE PAIN OF RIGHT KNEE: ICD-10-CM

## 2021-07-28 DIAGNOSIS — E78.2 MIXED HYPERLIPIDEMIA: Primary | ICD-10-CM

## 2021-07-28 DIAGNOSIS — E04.2 MULTIPLE THYROID NODULES: ICD-10-CM

## 2021-07-28 PROCEDURE — 1126F PR PAIN SEVERITY QUANTIFIED, NO PAIN PRESENT: ICD-10-PCS | Mod: CPTII,S$GLB,, | Performed by: INTERNAL MEDICINE

## 2021-07-28 PROCEDURE — 99214 OFFICE O/P EST MOD 30 MIN: CPT | Mod: S$GLB,,, | Performed by: INTERNAL MEDICINE

## 2021-07-28 PROCEDURE — 3044F PR MOST RECENT HEMOGLOBIN A1C LEVEL <7.0%: ICD-10-PCS | Mod: CPTII,S$GLB,, | Performed by: INTERNAL MEDICINE

## 2021-07-28 PROCEDURE — 99214 PR OFFICE/OUTPT VISIT, EST, LEVL IV, 30-39 MIN: ICD-10-PCS | Mod: S$GLB,,, | Performed by: INTERNAL MEDICINE

## 2021-07-28 PROCEDURE — 99999 PR PBB SHADOW E&M-EST. PATIENT-LVL IV: ICD-10-PCS | Mod: PBBFAC,,, | Performed by: INTERNAL MEDICINE

## 2021-07-28 PROCEDURE — 1160F RVW MEDS BY RX/DR IN RCRD: CPT | Mod: CPTII,S$GLB,, | Performed by: INTERNAL MEDICINE

## 2021-07-28 PROCEDURE — 3008F BODY MASS INDEX DOCD: CPT | Mod: CPTII,S$GLB,, | Performed by: INTERNAL MEDICINE

## 2021-07-28 PROCEDURE — 1159F PR MEDICATION LIST DOCUMENTED IN MEDICAL RECORD: ICD-10-PCS | Mod: CPTII,S$GLB,, | Performed by: INTERNAL MEDICINE

## 2021-07-28 PROCEDURE — 1159F MED LIST DOCD IN RCRD: CPT | Mod: CPTII,S$GLB,, | Performed by: INTERNAL MEDICINE

## 2021-07-28 PROCEDURE — 1126F AMNT PAIN NOTED NONE PRSNT: CPT | Mod: CPTII,S$GLB,, | Performed by: INTERNAL MEDICINE

## 2021-07-28 PROCEDURE — 1101F PR PT FALLS ASSESS DOC 0-1 FALLS W/OUT INJ PAST YR: ICD-10-PCS | Mod: CPTII,S$GLB,, | Performed by: INTERNAL MEDICINE

## 2021-07-28 PROCEDURE — 3078F PR MOST RECENT DIASTOLIC BLOOD PRESSURE < 80 MM HG: ICD-10-PCS | Mod: CPTII,S$GLB,, | Performed by: INTERNAL MEDICINE

## 2021-07-28 PROCEDURE — 3078F DIAST BP <80 MM HG: CPT | Mod: CPTII,S$GLB,, | Performed by: INTERNAL MEDICINE

## 2021-07-28 PROCEDURE — 1160F PR REVIEW ALL MEDS BY PRESCRIBER/CLIN PHARMACIST DOCUMENTED: ICD-10-PCS | Mod: CPTII,S$GLB,, | Performed by: INTERNAL MEDICINE

## 2021-07-28 PROCEDURE — 3008F PR BODY MASS INDEX (BMI) DOCUMENTED: ICD-10-PCS | Mod: CPTII,S$GLB,, | Performed by: INTERNAL MEDICINE

## 2021-07-28 PROCEDURE — 3044F HG A1C LEVEL LT 7.0%: CPT | Mod: CPTII,S$GLB,, | Performed by: INTERNAL MEDICINE

## 2021-07-28 PROCEDURE — 1101F PT FALLS ASSESS-DOCD LE1/YR: CPT | Mod: CPTII,S$GLB,, | Performed by: INTERNAL MEDICINE

## 2021-07-28 PROCEDURE — 99999 PR PBB SHADOW E&M-EST. PATIENT-LVL IV: CPT | Mod: PBBFAC,,, | Performed by: INTERNAL MEDICINE

## 2021-07-28 PROCEDURE — 3288F FALL RISK ASSESSMENT DOCD: CPT | Mod: CPTII,S$GLB,, | Performed by: INTERNAL MEDICINE

## 2021-07-28 PROCEDURE — 3074F SYST BP LT 130 MM HG: CPT | Mod: CPTII,S$GLB,, | Performed by: INTERNAL MEDICINE

## 2021-07-28 PROCEDURE — 3074F PR MOST RECENT SYSTOLIC BLOOD PRESSURE < 130 MM HG: ICD-10-PCS | Mod: CPTII,S$GLB,, | Performed by: INTERNAL MEDICINE

## 2021-07-28 PROCEDURE — 3288F PR FALLS RISK ASSESSMENT DOCUMENTED: ICD-10-PCS | Mod: CPTII,S$GLB,, | Performed by: INTERNAL MEDICINE

## 2021-07-28 RX ORDER — METHIMAZOLE 5 MG/1
TABLET ORAL
Qty: 135 TABLET | Refills: 3 | Status: SHIPPED | OUTPATIENT
Start: 2021-07-28 | End: 2022-01-05 | Stop reason: SDUPTHER

## 2021-07-28 RX ORDER — METHIMAZOLE 5 MG/1
TABLET ORAL
Qty: 45 TABLET | Refills: 3 | Status: SHIPPED | OUTPATIENT
Start: 2021-07-28 | End: 2021-07-28

## 2021-07-28 RX ORDER — NAPROXEN 500 MG/1
500 TABLET ORAL 2 TIMES DAILY WITH MEALS
Qty: 30 TABLET | Refills: 1 | Status: SHIPPED | OUTPATIENT
Start: 2021-07-28 | End: 2021-09-29

## 2021-08-26 ENCOUNTER — PATIENT OUTREACH (OUTPATIENT)
Dept: ADMINISTRATIVE | Facility: OTHER | Age: 66
End: 2021-08-26

## 2021-09-24 ENCOUNTER — LAB VISIT (OUTPATIENT)
Dept: LAB | Facility: HOSPITAL | Age: 66
End: 2021-09-24
Attending: INTERNAL MEDICINE
Payer: MEDICARE

## 2021-09-24 DIAGNOSIS — E05.90 PRIMARY HYPERTHYROIDISM: ICD-10-CM

## 2021-09-24 DIAGNOSIS — I10 ESSENTIAL HYPERTENSION: ICD-10-CM

## 2021-09-24 DIAGNOSIS — E78.2 MIXED HYPERLIPIDEMIA: ICD-10-CM

## 2021-09-24 DIAGNOSIS — Z79.899 MEDICATION MANAGEMENT: ICD-10-CM

## 2021-09-24 LAB
ALBUMIN SERPL BCP-MCNC: 3.8 G/DL (ref 3.5–5.2)
ALP SERPL-CCNC: 58 U/L (ref 55–135)
ALT SERPL W/O P-5'-P-CCNC: 18 U/L (ref 10–44)
ANION GAP SERPL CALC-SCNC: 7 MMOL/L (ref 8–16)
AST SERPL-CCNC: 15 U/L (ref 10–40)
BILIRUB SERPL-MCNC: 0.5 MG/DL (ref 0.1–1)
BUN SERPL-MCNC: 9 MG/DL (ref 8–23)
CALCIUM SERPL-MCNC: 9.3 MG/DL (ref 8.7–10.5)
CHLORIDE SERPL-SCNC: 104 MMOL/L (ref 95–110)
CHOLEST SERPL-MCNC: 219 MG/DL (ref 120–199)
CHOLEST/HDLC SERPL: 4.5 {RATIO} (ref 2–5)
CO2 SERPL-SCNC: 30 MMOL/L (ref 23–29)
CREAT SERPL-MCNC: 0.7 MG/DL (ref 0.5–1.4)
EST. GFR  (AFRICAN AMERICAN): >60 ML/MIN/1.73 M^2
EST. GFR  (NON AFRICAN AMERICAN): >60 ML/MIN/1.73 M^2
ESTIMATED AVG GLUCOSE: 114 MG/DL (ref 68–131)
GLUCOSE SERPL-MCNC: 94 MG/DL (ref 70–110)
HBA1C MFR BLD: 5.6 % (ref 4–5.6)
HDLC SERPL-MCNC: 49 MG/DL (ref 40–75)
HDLC SERPL: 22.4 % (ref 20–50)
LDLC SERPL CALC-MCNC: 142.4 MG/DL (ref 63–159)
NONHDLC SERPL-MCNC: 170 MG/DL
POTASSIUM SERPL-SCNC: 3.5 MMOL/L (ref 3.5–5.1)
PROT SERPL-MCNC: 6.8 G/DL (ref 6–8.4)
SODIUM SERPL-SCNC: 141 MMOL/L (ref 136–145)
T4 FREE SERPL-MCNC: 1.11 NG/DL (ref 0.71–1.51)
TRIGL SERPL-MCNC: 138 MG/DL (ref 30–150)
TSH SERPL DL<=0.005 MIU/L-ACNC: 1.33 UIU/ML (ref 0.4–4)

## 2021-09-24 PROCEDURE — 83036 HEMOGLOBIN GLYCOSYLATED A1C: CPT | Mod: HCNC | Performed by: INTERNAL MEDICINE

## 2021-09-24 PROCEDURE — 84443 ASSAY THYROID STIM HORMONE: CPT | Mod: HCNC | Performed by: INTERNAL MEDICINE

## 2021-09-24 PROCEDURE — 84439 ASSAY OF FREE THYROXINE: CPT | Mod: HCNC | Performed by: INTERNAL MEDICINE

## 2021-09-24 PROCEDURE — 80061 LIPID PANEL: CPT | Mod: HCNC | Performed by: INTERNAL MEDICINE

## 2021-09-24 PROCEDURE — 80053 COMPREHEN METABOLIC PANEL: CPT | Mod: HCNC | Performed by: INTERNAL MEDICINE

## 2021-09-24 PROCEDURE — 36415 COLL VENOUS BLD VENIPUNCTURE: CPT | Mod: HCNC | Performed by: INTERNAL MEDICINE

## 2021-09-29 ENCOUNTER — OFFICE VISIT (OUTPATIENT)
Dept: FAMILY MEDICINE | Facility: CLINIC | Age: 66
End: 2021-09-29
Payer: MEDICARE

## 2021-09-29 VITALS
DIASTOLIC BLOOD PRESSURE: 82 MMHG | BODY MASS INDEX: 33.12 KG/M2 | HEART RATE: 68 BPM | SYSTOLIC BLOOD PRESSURE: 128 MMHG | OXYGEN SATURATION: 98 % | WEIGHT: 186.94 LBS | HEIGHT: 63 IN

## 2021-09-29 DIAGNOSIS — M25.561 ACUTE PAIN OF RIGHT KNEE: ICD-10-CM

## 2021-09-29 DIAGNOSIS — M25.571 ACUTE RIGHT ANKLE PAIN: ICD-10-CM

## 2021-09-29 DIAGNOSIS — R10.13 DYSPEPSIA: Primary | ICD-10-CM

## 2021-09-29 PROCEDURE — 1101F PR PT FALLS ASSESS DOC 0-1 FALLS W/OUT INJ PAST YR: ICD-10-PCS | Mod: HCNC,CPTII,S$GLB, | Performed by: INTERNAL MEDICINE

## 2021-09-29 PROCEDURE — 99213 PR OFFICE/OUTPT VISIT, EST, LEVL III, 20-29 MIN: ICD-10-PCS | Mod: HCNC,S$GLB,, | Performed by: INTERNAL MEDICINE

## 2021-09-29 PROCEDURE — 99999 PR PBB SHADOW E&M-EST. PATIENT-LVL III: CPT | Mod: PBBFAC,HCNC,, | Performed by: INTERNAL MEDICINE

## 2021-09-29 PROCEDURE — 1159F MED LIST DOCD IN RCRD: CPT | Mod: HCNC,CPTII,S$GLB, | Performed by: INTERNAL MEDICINE

## 2021-09-29 PROCEDURE — 99999 PR PBB SHADOW E&M-EST. PATIENT-LVL III: ICD-10-PCS | Mod: PBBFAC,HCNC,, | Performed by: INTERNAL MEDICINE

## 2021-09-29 PROCEDURE — 99213 OFFICE O/P EST LOW 20 MIN: CPT | Mod: HCNC,S$GLB,, | Performed by: INTERNAL MEDICINE

## 2021-09-29 PROCEDURE — 1159F PR MEDICATION LIST DOCUMENTED IN MEDICAL RECORD: ICD-10-PCS | Mod: HCNC,CPTII,S$GLB, | Performed by: INTERNAL MEDICINE

## 2021-09-29 PROCEDURE — 3074F PR MOST RECENT SYSTOLIC BLOOD PRESSURE < 130 MM HG: ICD-10-PCS | Mod: HCNC,CPTII,S$GLB, | Performed by: INTERNAL MEDICINE

## 2021-09-29 PROCEDURE — 3044F HG A1C LEVEL LT 7.0%: CPT | Mod: HCNC,CPTII,S$GLB, | Performed by: INTERNAL MEDICINE

## 2021-09-29 PROCEDURE — 3008F BODY MASS INDEX DOCD: CPT | Mod: HCNC,CPTII,S$GLB, | Performed by: INTERNAL MEDICINE

## 2021-09-29 PROCEDURE — 3044F PR MOST RECENT HEMOGLOBIN A1C LEVEL <7.0%: ICD-10-PCS | Mod: HCNC,CPTII,S$GLB, | Performed by: INTERNAL MEDICINE

## 2021-09-29 PROCEDURE — 3079F DIAST BP 80-89 MM HG: CPT | Mod: HCNC,CPTII,S$GLB, | Performed by: INTERNAL MEDICINE

## 2021-09-29 PROCEDURE — 1160F PR REVIEW ALL MEDS BY PRESCRIBER/CLIN PHARMACIST DOCUMENTED: ICD-10-PCS | Mod: HCNC,CPTII,S$GLB, | Performed by: INTERNAL MEDICINE

## 2021-09-29 PROCEDURE — 1160F RVW MEDS BY RX/DR IN RCRD: CPT | Mod: HCNC,CPTII,S$GLB, | Performed by: INTERNAL MEDICINE

## 2021-09-29 PROCEDURE — 3288F FALL RISK ASSESSMENT DOCD: CPT | Mod: HCNC,CPTII,S$GLB, | Performed by: INTERNAL MEDICINE

## 2021-09-29 PROCEDURE — 3074F SYST BP LT 130 MM HG: CPT | Mod: HCNC,CPTII,S$GLB, | Performed by: INTERNAL MEDICINE

## 2021-09-29 PROCEDURE — 3008F PR BODY MASS INDEX (BMI) DOCUMENTED: ICD-10-PCS | Mod: HCNC,CPTII,S$GLB, | Performed by: INTERNAL MEDICINE

## 2021-09-29 PROCEDURE — 1101F PT FALLS ASSESS-DOCD LE1/YR: CPT | Mod: HCNC,CPTII,S$GLB, | Performed by: INTERNAL MEDICINE

## 2021-09-29 PROCEDURE — 3079F PR MOST RECENT DIASTOLIC BLOOD PRESSURE 80-89 MM HG: ICD-10-PCS | Mod: HCNC,CPTII,S$GLB, | Performed by: INTERNAL MEDICINE

## 2021-09-29 PROCEDURE — 3288F PR FALLS RISK ASSESSMENT DOCUMENTED: ICD-10-PCS | Mod: HCNC,CPTII,S$GLB, | Performed by: INTERNAL MEDICINE

## 2021-09-29 RX ORDER — ALUMINUM ZIRCONIUM OCTACHLOROHYDREX GLY 16 G/100G
1 GEL TOPICAL DAILY
Qty: 30 CAPSULE | Refills: 3 | Status: SHIPPED | OUTPATIENT
Start: 2021-09-29

## 2021-09-29 RX ORDER — PANTOPRAZOLE SODIUM 40 MG/1
40 TABLET, DELAYED RELEASE ORAL DAILY
Qty: 30 TABLET | Refills: 11 | Status: SHIPPED | OUTPATIENT
Start: 2021-09-29 | End: 2022-01-05 | Stop reason: SDUPTHER

## 2021-09-29 RX ORDER — NAPROXEN 500 MG/1
500 TABLET ORAL 2 TIMES DAILY WITH MEALS
Qty: 30 TABLET | Refills: 1 | Status: SHIPPED | OUTPATIENT
Start: 2021-09-29 | End: 2022-01-05 | Stop reason: SDUPTHER

## 2021-10-04 ENCOUNTER — PATIENT OUTREACH (OUTPATIENT)
Dept: ADMINISTRATIVE | Facility: OTHER | Age: 66
End: 2021-10-04

## 2021-10-04 ENCOUNTER — PATIENT MESSAGE (OUTPATIENT)
Dept: ADMINISTRATIVE | Facility: HOSPITAL | Age: 66
End: 2021-10-04

## 2021-10-05 ENCOUNTER — TELEPHONE (OUTPATIENT)
Dept: FAMILY MEDICINE | Facility: CLINIC | Age: 66
End: 2021-10-05

## 2021-10-05 ENCOUNTER — OFFICE VISIT (OUTPATIENT)
Dept: CARDIOLOGY | Facility: CLINIC | Age: 66
End: 2021-10-05
Payer: MEDICARE

## 2021-10-05 VITALS
HEIGHT: 63 IN | WEIGHT: 189.13 LBS | BODY MASS INDEX: 33.51 KG/M2 | HEART RATE: 70 BPM | SYSTOLIC BLOOD PRESSURE: 119 MMHG | OXYGEN SATURATION: 97 % | DIASTOLIC BLOOD PRESSURE: 83 MMHG

## 2021-10-05 DIAGNOSIS — I10 PRIMARY HYPERTENSION: ICD-10-CM

## 2021-10-05 DIAGNOSIS — E05.90 PRIMARY HYPERTHYROIDISM: ICD-10-CM

## 2021-10-05 DIAGNOSIS — I87.2 VENOUS INSUFFICIENCY OF BOTH LOWER EXTREMITIES: Primary | ICD-10-CM

## 2021-10-05 DIAGNOSIS — E78.2 MIXED HYPERLIPIDEMIA: ICD-10-CM

## 2021-10-05 DIAGNOSIS — I83.813 VARICOSE VEINS OF BOTH LOWER EXTREMITIES WITH PAIN: ICD-10-CM

## 2021-10-05 DIAGNOSIS — E66.9 OBESITY (BMI 30.0-34.9): ICD-10-CM

## 2021-10-05 PROBLEM — E66.811 OBESITY (BMI 30.0-34.9): Status: ACTIVE | Noted: 2021-10-05

## 2021-10-05 PROCEDURE — 1101F PT FALLS ASSESS-DOCD LE1/YR: CPT | Mod: HCNC,CPTII,S$GLB, | Performed by: INTERNAL MEDICINE

## 2021-10-05 PROCEDURE — 99999 PR PBB SHADOW E&M-EST. PATIENT-LVL IV: CPT | Mod: PBBFAC,HCNC,, | Performed by: INTERNAL MEDICINE

## 2021-10-05 PROCEDURE — 1101F PR PT FALLS ASSESS DOC 0-1 FALLS W/OUT INJ PAST YR: ICD-10-PCS | Mod: HCNC,CPTII,S$GLB, | Performed by: INTERNAL MEDICINE

## 2021-10-05 PROCEDURE — 3079F PR MOST RECENT DIASTOLIC BLOOD PRESSURE 80-89 MM HG: ICD-10-PCS | Mod: HCNC,CPTII,S$GLB, | Performed by: INTERNAL MEDICINE

## 2021-10-05 PROCEDURE — 1159F PR MEDICATION LIST DOCUMENTED IN MEDICAL RECORD: ICD-10-PCS | Mod: HCNC,CPTII,S$GLB, | Performed by: INTERNAL MEDICINE

## 2021-10-05 PROCEDURE — 3288F PR FALLS RISK ASSESSMENT DOCUMENTED: ICD-10-PCS | Mod: HCNC,CPTII,S$GLB, | Performed by: INTERNAL MEDICINE

## 2021-10-05 PROCEDURE — 3074F PR MOST RECENT SYSTOLIC BLOOD PRESSURE < 130 MM HG: ICD-10-PCS | Mod: HCNC,CPTII,S$GLB, | Performed by: INTERNAL MEDICINE

## 2021-10-05 PROCEDURE — 99999 PR PBB SHADOW E&M-EST. PATIENT-LVL IV: ICD-10-PCS | Mod: PBBFAC,HCNC,, | Performed by: INTERNAL MEDICINE

## 2021-10-05 PROCEDURE — 3074F SYST BP LT 130 MM HG: CPT | Mod: HCNC,CPTII,S$GLB, | Performed by: INTERNAL MEDICINE

## 2021-10-05 PROCEDURE — 1159F MED LIST DOCD IN RCRD: CPT | Mod: HCNC,CPTII,S$GLB, | Performed by: INTERNAL MEDICINE

## 2021-10-05 PROCEDURE — 3008F PR BODY MASS INDEX (BMI) DOCUMENTED: ICD-10-PCS | Mod: HCNC,CPTII,S$GLB, | Performed by: INTERNAL MEDICINE

## 2021-10-05 PROCEDURE — 3044F PR MOST RECENT HEMOGLOBIN A1C LEVEL <7.0%: ICD-10-PCS | Mod: HCNC,CPTII,S$GLB, | Performed by: INTERNAL MEDICINE

## 2021-10-05 PROCEDURE — 99204 PR OFFICE/OUTPT VISIT, NEW, LEVL IV, 45-59 MIN: ICD-10-PCS | Mod: HCNC,S$GLB,, | Performed by: INTERNAL MEDICINE

## 2021-10-05 PROCEDURE — 1160F PR REVIEW ALL MEDS BY PRESCRIBER/CLIN PHARMACIST DOCUMENTED: ICD-10-PCS | Mod: HCNC,CPTII,S$GLB, | Performed by: INTERNAL MEDICINE

## 2021-10-05 PROCEDURE — 99499 RISK ADDL DX/OHS AUDIT: ICD-10-PCS | Mod: HCNC,S$GLB,, | Performed by: INTERNAL MEDICINE

## 2021-10-05 PROCEDURE — 3288F FALL RISK ASSESSMENT DOCD: CPT | Mod: HCNC,CPTII,S$GLB, | Performed by: INTERNAL MEDICINE

## 2021-10-05 PROCEDURE — 3079F DIAST BP 80-89 MM HG: CPT | Mod: HCNC,CPTII,S$GLB, | Performed by: INTERNAL MEDICINE

## 2021-10-05 PROCEDURE — 3008F BODY MASS INDEX DOCD: CPT | Mod: HCNC,CPTII,S$GLB, | Performed by: INTERNAL MEDICINE

## 2021-10-05 PROCEDURE — 99499 UNLISTED E&M SERVICE: CPT | Mod: HCNC,S$GLB,, | Performed by: INTERNAL MEDICINE

## 2021-10-05 PROCEDURE — 1160F RVW MEDS BY RX/DR IN RCRD: CPT | Mod: HCNC,CPTII,S$GLB, | Performed by: INTERNAL MEDICINE

## 2021-10-05 PROCEDURE — 99204 OFFICE O/P NEW MOD 45 MIN: CPT | Mod: HCNC,S$GLB,, | Performed by: INTERNAL MEDICINE

## 2021-10-05 PROCEDURE — 3044F HG A1C LEVEL LT 7.0%: CPT | Mod: HCNC,CPTII,S$GLB, | Performed by: INTERNAL MEDICINE

## 2021-11-08 ENCOUNTER — TELEPHONE (OUTPATIENT)
Dept: CARDIOLOGY | Facility: CLINIC | Age: 66
End: 2021-11-08
Payer: MEDICARE

## 2021-12-06 ENCOUNTER — TELEPHONE (OUTPATIENT)
Dept: CARDIOLOGY | Facility: CLINIC | Age: 66
End: 2021-12-06
Payer: MEDICARE

## 2021-12-06 ENCOUNTER — PATIENT OUTREACH (OUTPATIENT)
Dept: ADMINISTRATIVE | Facility: OTHER | Age: 66
End: 2021-12-06
Payer: MEDICARE

## 2021-12-07 ENCOUNTER — OFFICE VISIT (OUTPATIENT)
Dept: CARDIOLOGY | Facility: CLINIC | Age: 66
End: 2021-12-07
Payer: MEDICARE

## 2021-12-07 VITALS
OXYGEN SATURATION: 97 % | DIASTOLIC BLOOD PRESSURE: 87 MMHG | HEART RATE: 64 BPM | BODY MASS INDEX: 33.98 KG/M2 | HEIGHT: 63 IN | WEIGHT: 191.81 LBS | SYSTOLIC BLOOD PRESSURE: 124 MMHG

## 2021-12-07 DIAGNOSIS — E66.9 OBESITY (BMI 30.0-34.9): ICD-10-CM

## 2021-12-07 DIAGNOSIS — I87.2 VENOUS INSUFFICIENCY OF BOTH LOWER EXTREMITIES: Primary | ICD-10-CM

## 2021-12-07 DIAGNOSIS — E78.2 MIXED HYPERLIPIDEMIA: ICD-10-CM

## 2021-12-07 DIAGNOSIS — I10 PRIMARY HYPERTENSION: ICD-10-CM

## 2021-12-07 PROCEDURE — 99214 PR OFFICE/OUTPT VISIT, EST, LEVL IV, 30-39 MIN: ICD-10-PCS | Mod: HCNC,S$GLB,, | Performed by: INTERNAL MEDICINE

## 2021-12-07 PROCEDURE — 99499 RISK ADDL DX/OHS AUDIT: ICD-10-PCS | Mod: HCNC,S$GLB,, | Performed by: INTERNAL MEDICINE

## 2021-12-07 PROCEDURE — 99999 PR PBB SHADOW E&M-EST. PATIENT-LVL IV: CPT | Mod: PBBFAC,HCNC,, | Performed by: INTERNAL MEDICINE

## 2021-12-07 PROCEDURE — 99499 UNLISTED E&M SERVICE: CPT | Mod: HCNC,S$GLB,, | Performed by: INTERNAL MEDICINE

## 2021-12-07 PROCEDURE — 99214 OFFICE O/P EST MOD 30 MIN: CPT | Mod: HCNC,S$GLB,, | Performed by: INTERNAL MEDICINE

## 2021-12-07 PROCEDURE — 99999 PR PBB SHADOW E&M-EST. PATIENT-LVL IV: ICD-10-PCS | Mod: PBBFAC,HCNC,, | Performed by: INTERNAL MEDICINE

## 2021-12-29 ENCOUNTER — HOSPITAL ENCOUNTER (OUTPATIENT)
Dept: RADIOLOGY | Facility: HOSPITAL | Age: 66
Discharge: HOME | End: 2021-12-29
Attending: INTERNAL MEDICINE
Payer: MEDICARE

## 2021-12-29 DIAGNOSIS — I87.2 VENOUS INSUFFICIENCY OF BOTH LOWER EXTREMITIES: ICD-10-CM

## 2021-12-29 PROCEDURE — 93970 US LOWER EXTREMITY VEINS BILATERAL INSUFFICIENCY: ICD-10-PCS | Mod: 26,HCNC,, | Performed by: RADIOLOGY

## 2021-12-29 PROCEDURE — 93970 EXTREMITY STUDY: CPT | Mod: 26,HCNC,, | Performed by: RADIOLOGY

## 2021-12-29 PROCEDURE — 93970 EXTREMITY STUDY: CPT | Mod: TC,HCNC

## 2022-01-05 ENCOUNTER — OFFICE VISIT (OUTPATIENT)
Dept: FAMILY MEDICINE | Facility: CLINIC | Age: 67
End: 2022-01-05
Payer: MEDICARE

## 2022-01-05 ENCOUNTER — TELEPHONE (OUTPATIENT)
Dept: FAMILY MEDICINE | Facility: CLINIC | Age: 67
End: 2022-01-05
Payer: MEDICARE

## 2022-01-05 ENCOUNTER — HOSPITAL ENCOUNTER (OUTPATIENT)
Dept: RADIOLOGY | Facility: HOSPITAL | Age: 67
Discharge: HOME | End: 2022-01-05
Attending: INTERNAL MEDICINE
Payer: MEDICARE

## 2022-01-05 VITALS
SYSTOLIC BLOOD PRESSURE: 114 MMHG | HEIGHT: 63 IN | DIASTOLIC BLOOD PRESSURE: 82 MMHG | WEIGHT: 188.5 LBS | BODY MASS INDEX: 33.4 KG/M2 | OXYGEN SATURATION: 97 % | HEART RATE: 74 BPM

## 2022-01-05 DIAGNOSIS — M25.521 RIGHT ELBOW PAIN: Primary | ICD-10-CM

## 2022-01-05 DIAGNOSIS — M25.561 ACUTE PAIN OF RIGHT KNEE: ICD-10-CM

## 2022-01-05 DIAGNOSIS — M25.571 ACUTE RIGHT ANKLE PAIN: ICD-10-CM

## 2022-01-05 DIAGNOSIS — M25.521 RIGHT ELBOW PAIN: ICD-10-CM

## 2022-01-05 DIAGNOSIS — E05.90 PRIMARY HYPERTHYROIDISM: ICD-10-CM

## 2022-01-05 DIAGNOSIS — I10 ESSENTIAL HYPERTENSION: ICD-10-CM

## 2022-01-05 DIAGNOSIS — R22.1 NECK MASS: ICD-10-CM

## 2022-01-05 DIAGNOSIS — Z12.11 COLON CANCER SCREENING: ICD-10-CM

## 2022-01-05 DIAGNOSIS — R10.13 DYSPEPSIA: ICD-10-CM

## 2022-01-05 PROCEDURE — G0008 FLU VACCINE - QUADRIVALENT - ADJUVANTED: ICD-10-PCS | Mod: S$GLB,,, | Performed by: INTERNAL MEDICINE

## 2022-01-05 PROCEDURE — 73562 XR KNEE ORTHO RIGHT: ICD-10-PCS | Mod: 26,HCNC,RT, | Performed by: INTERNAL MEDICINE

## 2022-01-05 PROCEDURE — 73560 X-RAY EXAM OF KNEE 1 OR 2: CPT | Mod: TC,HCNC,FY,LT,59

## 2022-01-05 PROCEDURE — 73080 X-RAY EXAM OF ELBOW: CPT | Mod: 26,HCNC,RT, | Performed by: RADIOLOGY

## 2022-01-05 PROCEDURE — 99214 PR OFFICE/OUTPT VISIT, EST, LEVL IV, 30-39 MIN: ICD-10-PCS | Mod: 25,S$GLB,, | Performed by: INTERNAL MEDICINE

## 2022-01-05 PROCEDURE — 3079F PR MOST RECENT DIASTOLIC BLOOD PRESSURE 80-89 MM HG: ICD-10-PCS | Mod: CPTII,S$GLB,, | Performed by: INTERNAL MEDICINE

## 2022-01-05 PROCEDURE — 90694 FLU VACCINE - QUADRIVALENT - ADJUVANTED: ICD-10-PCS | Mod: S$GLB,,, | Performed by: INTERNAL MEDICINE

## 2022-01-05 PROCEDURE — 73080 X-RAY EXAM OF ELBOW: CPT | Mod: TC,HCNC,FY,RT

## 2022-01-05 PROCEDURE — 73560 XR KNEE ORTHO RIGHT: ICD-10-PCS | Mod: 26,HCNC,LT, | Performed by: INTERNAL MEDICINE

## 2022-01-05 PROCEDURE — 3074F PR MOST RECENT SYSTOLIC BLOOD PRESSURE < 130 MM HG: ICD-10-PCS | Mod: CPTII,S$GLB,, | Performed by: INTERNAL MEDICINE

## 2022-01-05 PROCEDURE — 3008F BODY MASS INDEX DOCD: CPT | Mod: CPTII,S$GLB,, | Performed by: INTERNAL MEDICINE

## 2022-01-05 PROCEDURE — 73562 X-RAY EXAM OF KNEE 3: CPT | Mod: 26,HCNC,RT, | Performed by: INTERNAL MEDICINE

## 2022-01-05 PROCEDURE — 73080 XR ELBOW COMPLETE 3 VIEW RIGHT: ICD-10-PCS | Mod: 26,HCNC,RT, | Performed by: RADIOLOGY

## 2022-01-05 PROCEDURE — 73562 X-RAY EXAM OF KNEE 3: CPT | Mod: TC,HCNC,FY,RT

## 2022-01-05 PROCEDURE — 3008F PR BODY MASS INDEX (BMI) DOCUMENTED: ICD-10-PCS | Mod: CPTII,S$GLB,, | Performed by: INTERNAL MEDICINE

## 2022-01-05 PROCEDURE — 3074F SYST BP LT 130 MM HG: CPT | Mod: CPTII,S$GLB,, | Performed by: INTERNAL MEDICINE

## 2022-01-05 PROCEDURE — 90694 VACC AIIV4 NO PRSRV 0.5ML IM: CPT | Mod: S$GLB,,, | Performed by: INTERNAL MEDICINE

## 2022-01-05 PROCEDURE — 99999 PR PBB SHADOW E&M-EST. PATIENT-LVL V: ICD-10-PCS | Mod: PBBFAC,,, | Performed by: INTERNAL MEDICINE

## 2022-01-05 PROCEDURE — 1126F AMNT PAIN NOTED NONE PRSNT: CPT | Mod: CPTII,S$GLB,, | Performed by: INTERNAL MEDICINE

## 2022-01-05 PROCEDURE — 3288F FALL RISK ASSESSMENT DOCD: CPT | Mod: CPTII,S$GLB,, | Performed by: INTERNAL MEDICINE

## 2022-01-05 PROCEDURE — 1101F PT FALLS ASSESS-DOCD LE1/YR: CPT | Mod: CPTII,S$GLB,, | Performed by: INTERNAL MEDICINE

## 2022-01-05 PROCEDURE — 99214 OFFICE O/P EST MOD 30 MIN: CPT | Mod: 25,S$GLB,, | Performed by: INTERNAL MEDICINE

## 2022-01-05 PROCEDURE — 3288F PR FALLS RISK ASSESSMENT DOCUMENTED: ICD-10-PCS | Mod: CPTII,S$GLB,, | Performed by: INTERNAL MEDICINE

## 2022-01-05 PROCEDURE — 1101F PR PT FALLS ASSESS DOC 0-1 FALLS W/OUT INJ PAST YR: ICD-10-PCS | Mod: CPTII,S$GLB,, | Performed by: INTERNAL MEDICINE

## 2022-01-05 PROCEDURE — 73560 X-RAY EXAM OF KNEE 1 OR 2: CPT | Mod: 26,HCNC,LT, | Performed by: INTERNAL MEDICINE

## 2022-01-05 PROCEDURE — 1126F PR PAIN SEVERITY QUANTIFIED, NO PAIN PRESENT: ICD-10-PCS | Mod: CPTII,S$GLB,, | Performed by: INTERNAL MEDICINE

## 2022-01-05 PROCEDURE — G0008 ADMIN INFLUENZA VIRUS VAC: HCPCS | Mod: S$GLB,,, | Performed by: INTERNAL MEDICINE

## 2022-01-05 PROCEDURE — 3079F DIAST BP 80-89 MM HG: CPT | Mod: CPTII,S$GLB,, | Performed by: INTERNAL MEDICINE

## 2022-01-05 PROCEDURE — 99999 PR PBB SHADOW E&M-EST. PATIENT-LVL V: CPT | Mod: PBBFAC,,, | Performed by: INTERNAL MEDICINE

## 2022-01-05 RX ORDER — HYDROCHLOROTHIAZIDE 25 MG/1
50 TABLET ORAL DAILY
Qty: 60 TABLET | Refills: 6 | Status: SHIPPED | OUTPATIENT
Start: 2022-01-05

## 2022-01-05 RX ORDER — METHIMAZOLE 5 MG/1
TABLET ORAL
Qty: 135 TABLET | Refills: 3 | Status: SHIPPED | OUTPATIENT
Start: 2022-01-05

## 2022-01-05 RX ORDER — PANTOPRAZOLE SODIUM 40 MG/1
40 TABLET, DELAYED RELEASE ORAL DAILY
Qty: 30 TABLET | Refills: 11 | Status: SHIPPED | OUTPATIENT
Start: 2022-01-05 | End: 2023-01-05

## 2022-01-05 RX ORDER — NAPROXEN 500 MG/1
500 TABLET ORAL 2 TIMES DAILY WITH MEALS
Qty: 30 TABLET | Refills: 1 | Status: SHIPPED | OUTPATIENT
Start: 2022-01-05

## 2022-01-05 NOTE — PROGRESS NOTES
Subjective:       Patient ID: Priscilla Lim is a 66 y.o. female.    Chief Complaint: Results (U/s) and Fall (11/2021)      HPI  Priscilla Lim is a 66 y.o. female with chronic conditions of Graves disease, venous insufficiency, HLD, HTN  who presents today for follow up.    Reports few weeks ago had a fall on her right side when she tripped on a broken  Sidewalk.  Her right arm, especially elbow and right knee have been hurting.  Denies swelling (except for 1st few days after her fall), redness, or fever.  There is no weakness of the legs but right arm sometimes can be weak.  Has been taking naproxen with some improvement with the symptoms recur.  Her right knee tends to hurt when she bends her knee.    Her GI symptoms have improved.     Her  notices a lump on the posterior left side of her neck.  Patient denies pain, numbness, weakness, or discoloration, no recent trauma.    Weight has been stable, denies chest pains or palpitations.    Has no toxic habits.  She is considering plastic surgery to her thighs.    Health Maintenance:  Health Maintenance   Topic Date Due    TETANUS VACCINE  Never done    Mammogram  02/25/2022    DEXA SCAN  05/19/2024    Lipid Panel  09/24/2026    Hepatitis C Screening  Completed       Review of Systems   Constitutional: Negative for diaphoresis and fever.   HENT: Positive for sore throat and voice change. Negative for nasal congestion.    Respiratory: Negative for cough and wheezing.    Cardiovascular: Negative for chest pain and palpitations.   Genitourinary: Negative.    Musculoskeletal: Positive for arthralgias (right elbow and knee) and joint swelling.   Neurological: Negative for facial asymmetry.      Past Medical History:   Diagnosis Date    Grave's disease     thyroid nodule    Hypertension     Mixed hyperlipidemia 8/23/2013       Past Surgical History:   Procedure Laterality Date    COLONOSCOPY      ESOPHAGOGASTRODUODENOSCOPY       ESOPHAGOGASTRODUODENOSCOPY N/A 10/9/2020    Procedure: EGD (ESOPHAGOGASTRODUODENOSCOPY);  Surgeon: Nader Barnett MD;  [facility] ([facility]);  Service: Endoscopy;  Laterality: N/A;    LAPAROSCOPIC CHOLECYSTECTOMY N/A 10/12/2020    Procedure: CHOLECYSTECTOMY, LAPAROSCOPIC;  Surgeon: Carina Gooden MD;  [facility];  Service: General;  Laterality: N/A;       Family History   Problem Relation Age of Onset    Heart disease Mother     Stroke Mother     Hypertension Sister     Diabetes Sister     Hypertension Brother     Benign prostatic hyperplasia Father     No Known Problems Daughter     No Known Problems Son     No Known Problems Son     Hypertension Sister     Hypertension Sister     Diabetes Sister     Hypertension Sister     Diabetes Sister     Hypertension Sister     Diabetes Sister     Hypertension Brother     Hypertension Brother     Diabetes Brother     Hypertension Brother     Hypertension Brother     Hypertension Brother     Breast cancer Neg Hx     Colon cancer Neg Hx     Ovarian cancer Neg Hx        Social History     Socioeconomic History    Marital status:    Tobacco Use    Smoking status: Former Smoker     Packs/day: 0.10     Years: 2.00     Pack years: 0.20     Quit date: 2010     Years since quittin.4    Smokeless tobacco: Former User    Tobacco comment: ex-social smoker   Substance and Sexual Activity    Alcohol use: Yes     Comment: socially    Drug use: No    Sexual activity: Yes     Partners: Male     Birth control/protection: Post-menopausal       Current Outpatient Medications   Medication Sig Dispense Refill    Bifidobacterium infantis (ALIGN) 4 mg Cap Take 1 capsule (4 mg total) by mouth once daily. 30 capsule 3    calcium carbonate-vit D3-min 600 mg calcium- 400 unit Tab Take 1 tablet by mouth 2 (two) times a day. 60 tablet 11    clotrimazole (LOTRIMIN) 1 % cream       hydroCHLOROthiazide (HYDRODIURIL) 25 MG  "tablet TAKE TWO TABLETS BY MOUTH EVERY DAY 60 tablet 6    methIMAzole (TAPAZOLE) 5 MG Tab TAKE ONE AND ONE-HALF (1 & 1/2) TABLETS BY MOUTH EVERY  tablet 3    MULTIVIT-IRON-MIN-FOLIC ACID 3,500-18-0.4 UNIT-MG-MG ORAL CHEW Take by mouth.      naproxen (NAPROSYN) 500 MG tablet Take 1 tablet (500 mg total) by mouth 2 (two) times daily with meals. 30 tablet 1    pantoprazole (PROTONIX) 40 MG tablet Take 1 tablet (40 mg total) by mouth once daily. 30 tablet 11     No current facility-administered medications for this visit.       Review of patient's allergies indicates:  No Known Allergies      Objective:       Last 3 sets of Vitals    Vitals - 1 value per visit 10/5/2021 12/7/2021 1/5/2022   SYSTOLIC 119 124 114   DIASTOLIC 83 87 82   PULSE 70 64 74   TEMPERATURE - - -   RESPIRATIONS - - -   SPO2 97 97 97   Weight (lb) 189.15 191.8 188.49   Weight (kg) 85.8 87 85.5   HEIGHT 5' 3" 5' 3" 5' 3"   BODY MASS INDEX 33.51 33.98 33.39   VISIT REPORT - - -   Pain Score  0 8 0   Some recent data might be hidden   Physical Exam  Constitutional:       General: She is not in acute distress.     Appearance: Normal appearance.   HENT:      Head: Normocephalic.      Right Ear: Tympanic membrane normal.      Left Ear: Tympanic membrane normal.      Mouth/Throat:      Pharynx: Oropharynx is clear.   Eyes:      General: No scleral icterus.     Extraocular Movements: Extraocular movements intact.      Conjunctiva/sclera: Conjunctivae normal.      Comments: Mild exophthalmus   Neck:      Comments: No goiter.  Cardiovascular:      Rate and Rhythm: Normal rate and regular rhythm.      Pulses: Normal pulses.      Heart sounds: Normal heart sounds.   Pulmonary:      Effort: Pulmonary effort is normal.      Breath sounds: Normal breath sounds.   Abdominal:      General: Bowel sounds are normal. There is no distension.      Palpations: Abdomen is soft.   Musculoskeletal:         General: Tenderness present. No swelling. Normal range of " motion.      Comments: Tenderness on the radial side   Lymphadenopathy:      Cervical: No cervical adenopathy.   Skin:     General: Skin is warm and dry.   Neurological:      General: No focal deficit present.      Mental Status: She is alert and oriented to person, place, and time.   Psychiatric:         Mood and Affect: Mood normal.         Behavior: Behavior normal.           CBC:  Recent Labs   Lab 10/13/20  0508 10/13/20  0508 02/10/21  1003 02/10/21  1003 05/06/21  0815   WBC 6.81   < > 4.04   < > 4.89   RBC 4.53   < > 4.85   < > 4.77   Hemoglobin 13.0   < > 13.7   < > 13.7   Hematocrit 39.8   < > 42.5   < > 40.7   Platelets 238   < > 252   < > 266   MCV 88   < > 88   < > 85   MCH 28.7   < > 28.2   < > 28.7   MCHC 32.7  --  32.2  --  33.7    < > = values in this interval not displayed.     CMP:  Recent Labs   Lab 02/10/21  1003 02/10/21  1003 05/06/21  0815 05/06/21  0815 09/24/21  0750   Glucose 92   < > 101   < > 94   Calcium 8.9   < > 8.9   < > 9.3   Albumin 3.7   < > 3.9   < > 3.8   Total Protein 6.8   < > 7.1   < > 6.8   Sodium 140   < > 137   < > 141   Potassium 3.5   < > 3.3 L   < > 3.5   CO2 29   < > 27   < > 30 H   Chloride 104   < > 100   < > 104   BUN 10   < > 6 L   < > 9   Creatinine 0.7   < > 0.7   < > 0.7   Alkaline Phosphatase 61   < > 64   < > 58   ALT 17   < > 15   < > 18   AST 15   < > 17   < > 15   Total Bilirubin 0.5  --  0.5  --  0.5    < > = values in this interval not displayed.     URINALYSIS:  Recent Labs   Lab 10/09/20  0040   Color, UA Yellow   Specific Gravity, UA 1.020   pH, UA 7.0   Protein, UA Negative   Nitrite, UA Negative   Leukocytes, UA Trace A      LIPIDS:  Recent Labs   Lab 02/10/21  1003 05/06/21  0815 09/24/21  0750   TSH 0.886 1.430 1.327   HDL 49 50 49   Cholesterol 219 H 221 H 219 H   Triglycerides 136 130 138   LDL Cholesterol 142.8 145.0 142.4   HDL/Cholesterol Ratio 22.4 22.6 22.4   Non-HDL Cholesterol 170 171 170   Total Cholesterol/HDL Ratio 4.5 4.4 4.5      TSH:  Recent Labs   Lab 02/10/21  1003 05/06/21  0815 09/24/21  0750   TSH 0.886 1.430 1.327       A1C:  Recent Labs   Lab 02/10/21  1003 05/06/21  0847 09/24/21  0750   Hemoglobin A1C 5.7 H 5.6 5.6       Imaging:  US Lower Extremity Veins Bilateral Insufficiency  Narrative: EXAMINATION:  US LOWER EXTREMITY VEINS BILATERAL INSUFFICIENCY    CLINICAL HISTORY:  Venous insufficiency (chronic) (peripheral)    TECHNIQUE:  Bilateral lower extremity venous Doppler exam including Valsalva or standing maneuvers for evaluation of venous insufficiency of the superficial systems bilaterally.  Reflux times greater than 500 ms were considered indicative of superficial system reflux.    COMPARISON:  None    FINDINGS:  Technique: Sonographic evaluation of bilateral lower extremity deep venous systems was performed. Grey scale, color flow and spectral doppler was performed.    With the patient standing and supporting weight on contralateral leg insufficiency within the greater saphenous and lesser saphenous venous system was tested with augmentation via distal manual compression.    Deep venous system:    There is evidence of color Doppler patency and/or compressibility within the bilateral common femoral, femoral, and popliteal veins.    Superficial venous system:    Right leg:    There is no evidence of reflux lasting longer than 500 ms in either the right greater saphenous or  lesser saphenous veins.    The maximal diameter within the right greater saphenous vein is 7 mm.    The maximal diameter within the right lesser saphenous vein is 4 mm.    Left leg:    There is no evidence of reflux lasting longer than 500 ms in either the left greater saphenous or lesser saphenous veins.    The maximal diameter within the left greater saphenous vein is 7 mm.    The maximal diameter within the left lesser saphenous vein is 3 mm.  Impression: 1. There is no evidence of hemodynamically significant venous reflux in either right or left  greater or lesser saphenous vein.    2. There is no evidence of bilateral lower extremity deep venous thrombosis.    Electronically signed by: Maury Iniguez  Date:    12/29/2021  Time:    10:43      Assessment:       1. Primary hyperthyroidism    2. Essential hypertension    3. Acute right ankle pain    4. Acute pain of right knee    5. Dyspepsia            Plan:       Priscilla was seen today for results and fall.    Diagnoses and all orders for this visit:    Right elbow pain  -     X-Ray Elbow Complete Right; Future  -     Ambulatory referral/consult to Orthopedics; Future  - Possible Epicondylitis.    Primary hyperthyroidism  -     methIMAzole (TAPAZOLE) 5 MG Tab; TAKE ONE AND ONE-HALF (1 & 1/2) TABLETS BY MOUTH EVERY DAY  -     hydroCHLOROthiazide (HYDRODIURIL) 25 MG tablet; Take 2 tablets (50 mg total) by mouth once daily.  -     Ambulatory referral/consult to Endocrinology; Future    Essential hypertension    Acute right ankle pain  -     naproxen (NAPROSYN) 500 MG tablet; Take 1 tablet (500 mg total) by mouth 2 (two) times daily with meals.  - consider podiatry evaluation in    Acute pain of right knee  -     naproxen (NAPROSYN) 500 MG tablet; Take 1 tablet (500 mg total) by mouth 2 (two) times daily with meals.  -     X-ray Knee Ortho Right; Future  -     Ambulatory referral/consult to Orthopedics; Future    Dyspepsia  -     pantoprazole (PROTONIX) 40 MG tablet; Take 1 tablet (40 mg total) by mouth once daily.    Neck mass  -     Ambulatory referral/consult to General Surgery; Future    Colon cancer scre to ening  -     Case Request Endoscopy: COLONOSCOPY    Other orders  -     Influenza (FLUAD) - Quadrivalent (Adjuvanted) *Preferred* (65+) (PF)      Health Maintenance Due   Topic Date Due    TETANUS VACCINE  Never done    Shingles Vaccine (1 of 2) Never done    Colorectal Cancer Screening  11/04/2009    Pneumococcal Vaccines (Age 65+) (1 of 1 - PPSV23) Never done    Influenza Vaccine (1) 09/01/2021     COVID-19 Vaccine (3 - Booster for Pfizer series) 09/17/2021    Mammogram  02/25/2022        Kristine Roach MD  [facility]  Disclaimer:  This note has been generated using voice-recognition software. There may be grammatical or spelling errors that have been missed during proof-reading

## 2022-01-06 ENCOUNTER — IMMUNIZATION (OUTPATIENT)
Dept: INTERNAL MEDICINE | Facility: CLINIC | Age: 67
End: 2022-01-06
Payer: MEDICARE

## 2022-01-06 ENCOUNTER — TELEPHONE (OUTPATIENT)
Dept: FAMILY MEDICINE | Facility: CLINIC | Age: 67
End: 2022-01-06
Payer: MEDICARE

## 2022-01-06 DIAGNOSIS — Z23 NEED FOR VACCINATION: Primary | ICD-10-CM

## 2022-01-06 PROCEDURE — 0004A COVID-19, MRNA, LNP-S, PF, 30 MCG/0.3 ML DOSE VACCINE: CPT | Mod: HCNC,CV19,PBBFAC | Performed by: INTERNAL MEDICINE

## 2022-01-14 ENCOUNTER — PATIENT OUTREACH (OUTPATIENT)
Dept: ADMINISTRATIVE | Facility: OTHER | Age: 67
End: 2022-01-14
Payer: MEDICARE

## 2022-01-14 NOTE — PROGRESS NOTES
Health Maintenance Due   Topic Date Due    TETANUS VACCINE  Never done    Shingles Vaccine (1 of 2) Never done    Colorectal Cancer Screening  11/04/2009    Pneumococcal Vaccines (Age 65+) (1 of 1 - PPSV23) Never done    Mammogram  02/25/2022     Updates were requested from care everywhere.  Chart was reviewed for overdue Proactive [facility] Encounters ([facility]) topics (CRS, Breast Cancer Screening, Eye exam)  Health Maintenance has been updated.  LINKS immunization registry triggered.  Immunizations were reconciled.  Pt has open case request for colonoscopy. FIT kit not ordered.

## 2022-01-18 ENCOUNTER — OFFICE VISIT (OUTPATIENT)
Dept: CARDIOLOGY | Facility: CLINIC | Age: 67
End: 2022-01-18
Payer: MEDICARE

## 2022-01-18 ENCOUNTER — OFFICE VISIT (OUTPATIENT)
Dept: ORTHOPEDICS | Facility: CLINIC | Age: 67
End: 2022-01-18
Payer: MEDICARE

## 2022-01-18 VITALS
SYSTOLIC BLOOD PRESSURE: 126 MMHG | OXYGEN SATURATION: 98 % | HEART RATE: 74 BPM | WEIGHT: 190.5 LBS | DIASTOLIC BLOOD PRESSURE: 80 MMHG | HEIGHT: 63 IN | BODY MASS INDEX: 33.75 KG/M2

## 2022-01-18 VITALS
WEIGHT: 189.94 LBS | HEIGHT: 63 IN | DIASTOLIC BLOOD PRESSURE: 79 MMHG | SYSTOLIC BLOOD PRESSURE: 117 MMHG | HEART RATE: 64 BPM | BODY MASS INDEX: 33.66 KG/M2

## 2022-01-18 DIAGNOSIS — E05.00 GRAVES' ORBITOPATHY: ICD-10-CM

## 2022-01-18 DIAGNOSIS — I10 HYPERTENSION, UNSPECIFIED TYPE: ICD-10-CM

## 2022-01-18 DIAGNOSIS — E66.9 OBESITY (BMI 30.0-34.9): ICD-10-CM

## 2022-01-18 DIAGNOSIS — M77.11 LATERAL EPICONDYLITIS OF RIGHT ELBOW: Primary | ICD-10-CM

## 2022-01-18 DIAGNOSIS — E78.2 MIXED HYPERLIPIDEMIA: ICD-10-CM

## 2022-01-18 DIAGNOSIS — M25.521 RIGHT ELBOW PAIN: ICD-10-CM

## 2022-01-18 DIAGNOSIS — I87.2 VENOUS INSUFFICIENCY OF BOTH LOWER EXTREMITIES: Primary | ICD-10-CM

## 2022-01-18 DIAGNOSIS — E05.90 PRIMARY HYPERTHYROIDISM: ICD-10-CM

## 2022-01-18 PROCEDURE — 3008F BODY MASS INDEX DOCD: CPT | Mod: HCNC,CPTII,S$GLB, | Performed by: ORTHOPAEDIC SURGERY

## 2022-01-18 PROCEDURE — 3074F PR MOST RECENT SYSTOLIC BLOOD PRESSURE < 130 MM HG: ICD-10-PCS | Mod: HCNC,CPTII,S$GLB, | Performed by: INTERNAL MEDICINE

## 2022-01-18 PROCEDURE — 1125F PR PAIN SEVERITY QUANTIFIED, PAIN PRESENT: ICD-10-PCS | Mod: HCNC,CPTII,S$GLB, | Performed by: ORTHOPAEDIC SURGERY

## 2022-01-18 PROCEDURE — 1125F AMNT PAIN NOTED PAIN PRSNT: CPT | Mod: HCNC,CPTII,S$GLB, | Performed by: ORTHOPAEDIC SURGERY

## 2022-01-18 PROCEDURE — 3008F BODY MASS INDEX DOCD: CPT | Mod: HCNC,CPTII,S$GLB, | Performed by: INTERNAL MEDICINE

## 2022-01-18 PROCEDURE — 99214 OFFICE O/P EST MOD 30 MIN: CPT | Mod: HCNC,S$GLB,, | Performed by: INTERNAL MEDICINE

## 2022-01-18 PROCEDURE — 3288F PR FALLS RISK ASSESSMENT DOCUMENTED: ICD-10-PCS | Mod: HCNC,CPTII,S$GLB, | Performed by: ORTHOPAEDIC SURGERY

## 2022-01-18 PROCEDURE — 99999 PR PBB SHADOW E&M-EST. PATIENT-LVL IV: CPT | Mod: PBBFAC,HCNC,, | Performed by: ORTHOPAEDIC SURGERY

## 2022-01-18 PROCEDURE — 1159F PR MEDICATION LIST DOCUMENTED IN MEDICAL RECORD: ICD-10-PCS | Mod: HCNC,CPTII,S$GLB, | Performed by: ORTHOPAEDIC SURGERY

## 2022-01-18 PROCEDURE — 1125F PR PAIN SEVERITY QUANTIFIED, PAIN PRESENT: ICD-10-PCS | Mod: HCNC,CPTII,S$GLB, | Performed by: INTERNAL MEDICINE

## 2022-01-18 PROCEDURE — 99999 PR PBB SHADOW E&M-EST. PATIENT-LVL III: ICD-10-PCS | Mod: PBBFAC,HCNC,, | Performed by: INTERNAL MEDICINE

## 2022-01-18 PROCEDURE — 3074F PR MOST RECENT SYSTOLIC BLOOD PRESSURE < 130 MM HG: ICD-10-PCS | Mod: HCNC,CPTII,S$GLB, | Performed by: ORTHOPAEDIC SURGERY

## 2022-01-18 PROCEDURE — 3074F SYST BP LT 130 MM HG: CPT | Mod: HCNC,CPTII,S$GLB, | Performed by: ORTHOPAEDIC SURGERY

## 2022-01-18 PROCEDURE — 3074F SYST BP LT 130 MM HG: CPT | Mod: HCNC,CPTII,S$GLB, | Performed by: INTERNAL MEDICINE

## 2022-01-18 PROCEDURE — 99499 RISK ADDL DX/OHS AUDIT: ICD-10-PCS | Mod: S$GLB,,, | Performed by: INTERNAL MEDICINE

## 2022-01-18 PROCEDURE — 3008F PR BODY MASS INDEX (BMI) DOCUMENTED: ICD-10-PCS | Mod: HCNC,CPTII,S$GLB, | Performed by: ORTHOPAEDIC SURGERY

## 2022-01-18 PROCEDURE — 99204 PR OFFICE/OUTPT VISIT, NEW, LEVL IV, 45-59 MIN: ICD-10-PCS | Mod: HCNC,S$GLB,, | Performed by: ORTHOPAEDIC SURGERY

## 2022-01-18 PROCEDURE — 3079F DIAST BP 80-89 MM HG: CPT | Mod: HCNC,CPTII,S$GLB, | Performed by: INTERNAL MEDICINE

## 2022-01-18 PROCEDURE — 99204 OFFICE O/P NEW MOD 45 MIN: CPT | Mod: HCNC,S$GLB,, | Performed by: ORTHOPAEDIC SURGERY

## 2022-01-18 PROCEDURE — 99214 PR OFFICE/OUTPT VISIT, EST, LEVL IV, 30-39 MIN: ICD-10-PCS | Mod: HCNC,S$GLB,, | Performed by: INTERNAL MEDICINE

## 2022-01-18 PROCEDURE — 99499 UNLISTED E&M SERVICE: CPT | Mod: S$GLB,,, | Performed by: INTERNAL MEDICINE

## 2022-01-18 PROCEDURE — 1160F PR REVIEW ALL MEDS BY PRESCRIBER/CLIN PHARMACIST DOCUMENTED: ICD-10-PCS | Mod: HCNC,CPTII,S$GLB, | Performed by: ORTHOPAEDIC SURGERY

## 2022-01-18 PROCEDURE — 1125F AMNT PAIN NOTED PAIN PRSNT: CPT | Mod: HCNC,CPTII,S$GLB, | Performed by: INTERNAL MEDICINE

## 2022-01-18 PROCEDURE — 99999 PR PBB SHADOW E&M-EST. PATIENT-LVL IV: ICD-10-PCS | Mod: PBBFAC,HCNC,, | Performed by: ORTHOPAEDIC SURGERY

## 2022-01-18 PROCEDURE — 1101F PT FALLS ASSESS-DOCD LE1/YR: CPT | Mod: HCNC,CPTII,S$GLB, | Performed by: ORTHOPAEDIC SURGERY

## 2022-01-18 PROCEDURE — 1159F MED LIST DOCD IN RCRD: CPT | Mod: HCNC,CPTII,S$GLB, | Performed by: ORTHOPAEDIC SURGERY

## 2022-01-18 PROCEDURE — 3079F PR MOST RECENT DIASTOLIC BLOOD PRESSURE 80-89 MM HG: ICD-10-PCS | Mod: HCNC,CPTII,S$GLB, | Performed by: INTERNAL MEDICINE

## 2022-01-18 PROCEDURE — 1160F RVW MEDS BY RX/DR IN RCRD: CPT | Mod: HCNC,CPTII,S$GLB, | Performed by: ORTHOPAEDIC SURGERY

## 2022-01-18 PROCEDURE — 3008F PR BODY MASS INDEX (BMI) DOCUMENTED: ICD-10-PCS | Mod: HCNC,CPTII,S$GLB, | Performed by: INTERNAL MEDICINE

## 2022-01-18 PROCEDURE — 3288F FALL RISK ASSESSMENT DOCD: CPT | Mod: HCNC,CPTII,S$GLB, | Performed by: ORTHOPAEDIC SURGERY

## 2022-01-18 PROCEDURE — 3078F PR MOST RECENT DIASTOLIC BLOOD PRESSURE < 80 MM HG: ICD-10-PCS | Mod: HCNC,CPTII,S$GLB, | Performed by: ORTHOPAEDIC SURGERY

## 2022-01-18 PROCEDURE — 99999 PR PBB SHADOW E&M-EST. PATIENT-LVL III: CPT | Mod: PBBFAC,HCNC,, | Performed by: INTERNAL MEDICINE

## 2022-01-18 PROCEDURE — 1101F PR PT FALLS ASSESS DOC 0-1 FALLS W/OUT INJ PAST YR: ICD-10-PCS | Mod: HCNC,CPTII,S$GLB, | Performed by: ORTHOPAEDIC SURGERY

## 2022-01-18 PROCEDURE — 3078F DIAST BP <80 MM HG: CPT | Mod: HCNC,CPTII,S$GLB, | Performed by: ORTHOPAEDIC SURGERY

## 2022-01-18 RX ORDER — FUROSEMIDE 20 MG/1
20 TABLET ORAL DAILY
Qty: 30 TABLET | Refills: 11 | Status: SHIPPED | OUTPATIENT
Start: 2022-01-18 | End: 2023-01-18

## 2022-01-18 NOTE — PROGRESS NOTES
Subjective:    Patient ID:  Priscilla Lim is a 66 y.o. female who presents for follow-up of Venous Insufficiency      HPI    65 y/o [place]ian speaking female who presents for f/u of venous insufficiency. She has a hx of venous insufficiency, HTN, HLD, obesity. Has had varicose veins for several years. Had venous doppler 2018 with significant reflux and incidentally noted, non occlusive thrombus (present on report, however, not on med hx or clinic notes). Repeat recent venous insuff doppler with no evidence of significant reflux or thrombus. Has bulging varicosities that are painful by the end of the day, improved with elevation, and is on HCTZ. Has attempted compression stockings, however, limited effect and she does not tolerate wearing them. No claudication or ulcers. Denies CP, SOB/MARTIN, orthopnea, PND, syncope, palps. Did not obtain doppler after last visit.    Review of Systems   Constitutional: Negative for malaise/fatigue.   HENT: Negative for congestion.    Eyes: Negative for blurred vision.   Cardiovascular: Positive for leg swelling. Negative for chest pain, claudication, cyanosis, dyspnea on exertion, irregular heartbeat, near-syncope, orthopnea, palpitations, paroxysmal nocturnal dyspnea and syncope.   Respiratory: Negative for shortness of breath.    Endocrine: Negative for polyuria.   Hematologic/Lymphatic: Negative for bleeding problem.   Skin: Negative for itching and rash.   Musculoskeletal: Positive for myalgias. Negative for joint swelling, muscle cramps and muscle weakness.   Gastrointestinal: Negative for abdominal pain, hematemesis, hematochezia, melena, nausea and vomiting.   Genitourinary: Negative for dysuria and hematuria.   Neurological: Negative for dizziness, focal weakness, headaches, light-headedness, loss of balance and weakness.   Psychiatric/Behavioral: Negative for depression. The patient is not nervous/anxious.         Objective:    Physical Exam  Constitutional:       Appearance:  She is well-developed and well-nourished.   HENT:      Head: Normocephalic and atraumatic.   Neck:      Vascular: No JVD.   Cardiovascular:      Rate and Rhythm: Normal rate and regular rhythm.      Pulses:           Carotid pulses are 2+ on the right side and 2+ on the left side.       Radial pulses are 2+ on the right side and 2+ on the left side.        Femoral pulses are 2+ on the right side and 2+ on the left side.       Dorsalis pedis pulses are 2+ on the right side and 2+ on the left side.        Posterior tibial pulses are 2+ on the right side and 2+ on the left side.      Heart sounds: Normal heart sounds.   Pulmonary:      Effort: Pulmonary effort is normal.      Breath sounds: Normal breath sounds.   Abdominal:      General: Bowel sounds are normal.      Palpations: Abdomen is soft.   Musculoskeletal:      Cervical back: Neck supple.      Right lower leg: Edema present.      Left lower leg: Edema present.   Skin:     General: Skin is warm and dry.   Neurological:      Mental Status: She is alert and oriented to person, place, and time.   Psychiatric:         Mood and Affect: Mood and affect normal.         Behavior: Behavior normal.         Thought Content: Thought content normal.           Assessment:       1. Venous insufficiency of both lower extremities    2. Hypertension, unspecified type    3. Mixed hyperlipidemia    4. Graves' orbitopathy    5. Obesity (BMI 30.0-34.9)    6. Primary hyperthyroidism      67 y/o pt with hx and presentation as above. Doing well from a cardiac perspective and compensated from a HF perspective. Regarding venous insufficiency, no significant reflux on doppler. Recommended again compression stockings 20-30 mmHg. Will add lasix intermittently. Needs to lose weight, low sodium diet. Discussed the etiology, evaluation, and management of venous insuff, DVT, HTN, HLD, obesity. Discussed the importance of med compliance, heart healthy diet, and regular exercise.      Plan:        -Lasix 20 mg PRN  -f/u in 3 months

## 2022-01-21 NOTE — PROGRESS NOTES
Patient ID:   Priscilla Lim is a 66 y.o. female.    Chief Complaint:   Right elbow pain    HPI:   The patient is a 66-year-old female who is presenting with right elbow pain.  She is right-hand dominant.  In November of 2021, she did sustained a fall.  Her pain is over the lateral aspect of the elbow.  She describes burning pain.  She denies neck pain, numbness, or paresthesias.  The pain is present when she lifts items such as her purse.  She denies any relieving factors.  She denies any prior treatment.    Medications:    Current Outpatient Medications:     Bifidobacterium infantis (ALIGN) 4 mg Cap, Take 1 capsule (4 mg total) by mouth once daily., Disp: 30 capsule, Rfl: 3    calcium carbonate-vit D3-min 600 mg calcium- 400 unit Tab, Take 1 tablet by mouth 2 (two) times a day., Disp: 60 tablet, Rfl: 11    clotrimazole (LOTRIMIN) 1 % cream, , Disp: , Rfl:     furosemide (LASIX) 20 MG tablet, Take 1 tablet (20 mg total) by mouth once daily., Disp: 30 tablet, Rfl: 11    hydroCHLOROthiazide (HYDRODIURIL) 25 MG tablet, Take 2 tablets (50 mg total) by mouth once daily., Disp: 60 tablet, Rfl: 6    methIMAzole (TAPAZOLE) 5 MG Tab, TAKE ONE AND ONE-HALF (1 & 1/2) TABLETS BY MOUTH EVERY DAY, Disp: 135 tablet, Rfl: 3    MULTIVIT-IRON-MIN-FOLIC ACID 3,500-18-0.4 UNIT-MG-MG ORAL CHEW, Take by mouth., Disp: , Rfl:     naproxen (NAPROSYN) 500 MG tablet, Take 1 tablet (500 mg total) by mouth 2 (two) times daily with meals., Disp: 30 tablet, Rfl: 1    pantoprazole (PROTONIX) 40 MG tablet, Take 1 tablet (40 mg total) by mouth once daily., Disp: 30 tablet, Rfl: 11    Allergies:  Review of patient's allergies indicates:  No Known Allergies    Past Medical History:  Past Medical History:   Diagnosis Date    Grave's disease     thyroid nodule    Hypertension     Mixed hyperlipidemia 8/23/2013        Past Surgical History:  Past Surgical History:   Procedure Laterality Date    COLONOSCOPY      ESOPHAGOGASTRODUODENOSCOPY    "   ESOPHAGOGASTRODUODENOSCOPY N/A 10/9/2020    Procedure: EGD (ESOPHAGOGASTRODUODENOSCOPY);  Surgeon: Nader Barnett MD;  Location: [facility] ([address] [place] [address]);  Service: Endoscopy;  Laterality: N/A;    LAPAROSCOPIC CHOLECYSTECTOMY N/A 10/12/2020    Procedure: CHOLECYSTECTOMY, LAPAROSCOPIC;  Surgeon: Carina Gooden MD;  Location: [facility] OR [address] [place] [address];  Service: General;  Laterality: N/A;       Social History:  Social History     Occupational History    Not on file   Tobacco Use    Smoking status: Former Smoker     Packs/day: 0.10     Years: 2.00     Pack years: 0.20     Quit date: 2010     Years since quittin.5    Smokeless tobacco: Former User    Tobacco comment: ex-social smoker   Substance and Sexual Activity    Alcohol use: Yes     Comment: socially    Drug use: No    Sexual activity: Yes     Partners: Male     Birth control/protection: Post-menopausal       Family History:  Family History   Problem Relation Age of Onset    Heart disease Mother     Stroke Mother     Hypertension Sister     Diabetes Sister     Hypertension Brother     Benign prostatic hyperplasia Father     No Known Problems Daughter     No Known Problems Son     No Known Problems Son     Hypertension Sister     Hypertension Sister     Diabetes Sister     Hypertension Sister     Diabetes Sister     Hypertension Sister     Diabetes Sister     Hypertension Brother     Hypertension Brother     Diabetes Brother     Hypertension Brother     Hypertension Brother     Hypertension Brother     Breast cancer Neg Hx     Colon cancer Neg Hx     Ovarian cancer Neg Hx         ROS:  Review of Systems   Musculoskeletal: Positive for falls, joint pain and myalgias. Negative for stiffness.   Neurological: Negative for numbness and paresthesias.   All other systems reviewed and are negative.      Vitals:  /79   Pulse 64   Ht 5' 3" (1.6 m)   Wt 86.1 kg (189 lb 14.8 oz)   LMP  (LMP Unknown)   BMI 33.64 kg/m² "     Physical Examination:  Comprehensive Orthopaedic Musculoskeletal Exam    General        Constitutional: appears stated age, mildly obese, well-developed and well-nourished    Scleral icterus: no    Labored breathing: no    Psychiatric: normal mood and affect and no acute distress    Neurological: alert and oriented x3    Skin: intact    Lymphadenopathy: none     Ortho Exam   Right elbow pain:  No visible deformity.  Tender to palpation over the lateral epicondyle.  Range of motion of the elbow is full.  Forearm range of motion is full.  The elbow is stable.  Pain with resisted wrist and finger extension.  Light touch intact in the radial, ulnar, and median nerve distributions.    Imaging:  I have independently reviewed the following imaging studies performed at [facility]:  Right elbow XR series is unremarkable    Assessment:  1. Lateral epicondylitis of right elbow    2. Right elbow pain      Plan:  I have discussed the findings with the patient in detail.  I recommended non operative treatment consisting of activity modification, NSAIDs, counterforce bracing, and physical therapy.  She will return if she does not see improvement over the next 6-12 weeks.  Orders Placed This Encounter    HME - OTHER    Ambulatory referral/consult to Physical/Occupational Therapy     No follow-ups on file.

## 2022-01-24 ENCOUNTER — TELEPHONE (OUTPATIENT)
Dept: PULMONOLOGY | Facility: CLINIC | Age: 67
End: 2022-01-24
Payer: MEDICARE

## 2022-01-24 DIAGNOSIS — M79.631 RIGHT FOREARM PAIN: Primary | ICD-10-CM

## 2022-01-24 DIAGNOSIS — M25.561 ACUTE PAIN OF RIGHT KNEE: ICD-10-CM

## 2022-01-26 ENCOUNTER — CLINICAL SUPPORT (OUTPATIENT)
Dept: REHABILITATION | Facility: HOSPITAL | Age: 67
End: 2022-01-26
Attending: ORTHOPAEDIC SURGERY
Payer: MEDICARE

## 2022-01-26 DIAGNOSIS — M77.11 LATERAL EPICONDYLITIS OF RIGHT ELBOW: ICD-10-CM

## 2022-01-26 DIAGNOSIS — M25.521 ARTHRALGIA OF RIGHT ELBOW: ICD-10-CM

## 2022-01-26 DIAGNOSIS — R53.1 WEAKNESS: ICD-10-CM

## 2022-01-26 DIAGNOSIS — M25.522 LEFT ELBOW PAIN: Primary | ICD-10-CM

## 2022-01-26 PROBLEM — M25.50 JOINT PAIN: Status: ACTIVE | Noted: 2022-01-26

## 2022-01-26 PROCEDURE — 97165 OT EVAL LOW COMPLEX 30 MIN: CPT | Mod: HCNC,PN

## 2022-01-26 PROCEDURE — 97140 MANUAL THERAPY 1/> REGIONS: CPT | Mod: HCNC,PN

## 2022-01-26 PROCEDURE — 97110 THERAPEUTIC EXERCISES: CPT | Mod: HCNC,PN

## 2022-01-26 NOTE — PLAN OF CARE
"  [facility] Occupational Therapy  HAND/WRIST/ELBOW Initial Evaluation     Date: 2022  Patient: Priscilla Lim  Chart Number: [id number]  Referring Physician: Ammon Vargas MD  Therapy Diagnosis:   1. Left elbow pain     2. Lateral epicondylitis of right elbow  Ambulatory referral/consult to Physical/Occupational Therapy   3. Weakness     4. Arthralgia of right elbow         Medical Diagnosis: M77.11 (ICD-10-CM) - Lateral epicondylitis of right elbow  Physician Orders: eval and treat   2-3 x per week x 6 weeks   Consider dry needling      Evaluation Date: 2022  Insurance Authorization Period Expiration: through 23  Plan of Care from 2022 to 3/11/22   Surgery Date and Procedure: N/A  Date of Return to MD: needs scheduling    Visit #:   Time In: 1315  Time Out: 1410  Total Billable Time: 55    Precautions:  Standard    Subjective     Involved Side: right  Dominant Side: right  Date of Onset: 2021  History of Current Condition:   Per HPI on 22, "The patient is a 66-year-old female who is presenting with right elbow pain.  She is right-hand dominant.  In 2021, she did sustained a fall.  Her pain is over the lateral aspect of the elbow.  She describes burning pain.  She denies neck pain, numbness, or paresthesias.  The pain is present when she lifts items such as her purse.  She denies any relieving factors.  She denies any prior treatment."  Imagin22, "Normal alignment and mineralization.  No fracture, no osseous lesions.  No degenerative change.  No joint effusion.  The soft tissues appear normal."  Previous Therapy: none    Patient's Goals for Therapy: Priscilla desires to restore pain-free function of the Right UE for participating in all customary & necessary I/ADLs.    Pain:  Functional Pain Scale Rating 0-10:   8/10 on average  n/a/10 at best  9/10 at worst  Location: lateral aspect of right upper extremity.  Description: stabbing, " throbbing, grabbing, burning  Aggravating Factors: heavy lifting  Easing Factors: medication    Occupation:    Working presently: home-maker  Duties: caring for home.    Functional Limitations/Social History:    Previous functional status includes: Independent with all ADLs.     Current Level of Function    Home/Living environment : lives with their son   Limitation of Functional Status as follows:  ADLs/Self Care: often presses through pain.  Sleep frequently interrupted by pain.  I/ADLs/Driving: has to press through pain   Leisure: enjoys exercise, visiting with friends for coffee     CMS Impairment/Limitation/Restriction for FOTO Hand Survey     Therapist reviewed FOTO scores for Priscilla on 2022.   FOTO documents entered into [employer] - see Media section.     Limitation Score: will obtain at first session.        Past Medical History/Physical Systems Review:   Priscilla Lim  has a past medical history of Grave's disease, Hypertension, and Mixed hyperlipidemia.    Priscilla Lim  has a past surgical history that includes Esophagogastroduodenoscopy; Colonoscopy; Esophagogastroduodenoscopy (N/A, 10/9/2020); and Laparoscopic cholecystectomy (N/A, 10/12/2020).    Priscilla has a current medication list which includes the following prescription(s): align, calcium carbonate-vit d3-min, clotrimazole, furosemide, hydrochlorothiazide, methimazole, multivit-iron-min-folic acid, naproxen, and pantoprazole.    Review of patient's allergies indicates:  No Known Allergies       Objective     Mental status: alert, interactive, oriented x3    Observation:   Forward shoulders    Edema (girth measured in cm)   Right Left   3 Inches distal to elbow crease 28 28   Elbow crease 27 26.5   3 Inches proximal to elbow crease 26 26     Range of Motion:   Right  Comments: [facility]    Special Tests:   Right   2022   Cozen's Test +   Mill's Test +   Radial Tunnel Test/s -     Manual Muscle Testin/5 B UEs.    Common Extensor Tolerance  measured with: (ELHAM Dynamometer in psi.)      at Rung II Right/Left   Date    With Elbow flexed in neutral forearm position 25/43   With elbow extended in neutral forearm position 20/55   With elbow extended in pronation position 22/58   With elbow extended in supination position 25/41       Treatment     Treatment Time In: 1330  Treatment Time Out: 1410  Total Treatment time separate from Evaluation time:40    Priscilla received the following manual therapy techniques for 23 minutes:   - DTM in flornetino-epicondyle areas for promoting healing and reducing adverse scar adhesions  - application of elastic tape for space correction at lateral epicondyle for reducing pain and promoting healing. Precautions were discussed in detail.     Priscilla performed therapeutic exercises for 17 minutes including:  - Extrinsic extensor stretch, pain-free, in elbow flexion both elbow flexion and extension.  - Patient required frequent reminders not to push through pain.    Priscilla participated in an in-depth and concurrent discussion of evaluation findings and specific home care, including:  - education related to the tissue involvement as evidenced by MD diagnosis, positive response during provative movements and/or special testing.  - the anatomy and pathophysiology of diagnosis.  - instruction in activity modifications for goals of protecting healing tissues to include:  - wear of counter-force brace during daily activities only  - minimizing episodes of pain elicited by improper lifting strategies.  - safe tape application and removal    Home Exercise Program and Home Care Resources/Education:  Issued HEP (see patient instructions in EMR) and educated on modality use for pain management . Exercises were reviewed and Priscilla was able to demonstrate them prior to the end of the session.   Pt received a written copy of exercises to perform at home. Priscilla demonstrated good  understanding of the education provided.  Pt was advised to perform  these exercises free of pain, and to stop performing them if pain occurs.    Patient/Family Education: role of OT, goals for OT, scheduling/cancellations - pt verbalized understanding. Discussed insurance limitations with patient.    Assessment     Priscilla Lim is a 67 y.o. female presents with limitations as described in problem list. Patient can benefit from Occupational Therapy services for Iontophoresis, ultrasound, moist heat, therapeutic exercises, home exercise program provied with written instructions, ice and strengthening and orthotics, if deemed necessary . The following goals were discussed with the patient and she is in agreement with them as to be addressed in the treatment plan.    The patient's rehab potential is Excellent.     Anticipated barriers to occupational therapy: language differences.  Pt has no cultural, educational or language barriers to learning provided.    Profile and History Assessment of Occupational Performance Level of Clinical Decision Making Complexity Score   Occupational Profile:   Priscilla Lim is a 67 y.o. female who is retired Priscilla Lim has difficulty with  ADLs and IADLs as listed previously, which  affecting his/her daily functional abilities.      Comorbidities:    has a past medical history of Grave's disease, Hypertension, and Mixed hyperlipidemia.    Medical and Therapy History Review:   Brief               Performance Deficits    Physical:  Muscle Power/Strength  Muscle Endurance   Strength  Pain    Cognitive:  No Deficits    Psychosocial:    Habits  Routines  Rituals  Family Support     Clinical Decision Making:  moderate    Assessment Process:  Problem-Focused Assessments    Modification/Need for Assistance:  Minimal-Moderate Modifications/Assistance    Intervention Selection:  Limited Treatment Options       low  Based on PMHX, co morbidities , data from assessments and functional level of assistance required with task and clinical presentation  directly impacting function.         Goals:   LTG's (6-8 weeks):  1)  Increase right /lift tolerance (<3/10 pain) by 50% for advancing participation in ADLs and IADL without further tendon irritation.  2)  Decrease complaints of pain to 2 out of 10 at worst to indicate follow through with effective home care and compliance with protecting healing tissues during advancing I/ADL and leisure activities.  3)  Pt to be (I) with Phase II of HEP and demonstrate understanding of appropriately grading home care with or without orthotics/positioning assist.  4)   Patient to score at least 20 points less on FOTO to demonstrate improved perception of functional UE use for return to near to prior level of function for ADLs and household management with or without newly learned activity modifications.      STG's (4-6 weeks)  1)   Patient to be IND with Phase I of HEP and modalities for pain/edema managment.  2)   Pt to tolerate (<3/10 pain) advancing PREs with self-graded intensity and effective symptom monitoring.   3)  Patient to be IND with Orthotic use, wear and care precautions as needed for improving daytime and nighttime UE positioning as needed to protect healing tissues.  4)   Pt to report decreased episodes of pain in right UE during daytime activities with or without newly learned activity modifications.  5) Pt to report decreased episodes of abnormal sensation in right UE upon waking or during daytime positions.      Plan     Pt to be treated by Occupational Therapy 2 times per week for 6 weeks during the certification period from 1/26/2022 to 3/11/22 to achieve the established goals.     Treatment to include: Paraffin, Fluidotherapy, Manual therapy/joint mobilizations, Modalities for pain management, US 3 mhz, Therapeutic exercises/activities., Orthotic Fabrication/Fit/Training, Edema Control, Joint Protection and Energy Conservation, as well as any other treatments deemed necessary based on the patient's needs  or progress.     RUBEN LOZANO, OT  Occupational Therapist      I certify the need for these services furnished under this plan of treatment and while under my care    ____________________________________  Physician/Referring Practitioner    _______________  Date of Signature    I certify the need for these services furnished under this plan of treatment and while under my care.        Ammon Vargas MD, FAAOS  , Orthopaedic Sports Medicine  Residency   [facility]  Assistant Orthopaedic Surgeon, [facility]  Head Team Physician, [facility]

## 2022-01-26 NOTE — PATIENT INSTRUCTIONS
Understanding Lateral Epicondylitis    Tendons are strong bands of tissue that connect muscles to bones. Lateral epicondylitis affects the tendons that connect muscles in the forearm to the lateral epicondyle. This is the bony knob on the outer side of the elbow. The condition occurs if the extensor tendons of the wrist become red and swollen (irritated). This can cause pain in the elbow, forearm, and wrist. Because the condition is sometimes caused by playing tennis, it is also known as tennis elbow.  How to say it  LA-tuhr-marcel ap-om-LIP-duh-LY-tis   What causes lateral epicondylitis?  The condition most often occurs because of overuse. This can be from any activity that repeatedly puts stress on the forearm extensor muscles or tendons and wrist. For instance, playing tennis, lifting weights, cutting meat, painting, and typing can all cause the condition. Wear and tear of the tendons from aging or an injury to the tendons can also cause the condition.  Symptoms of lateral epicondylitis  The most common symptom is pain. You may feel it on the outer side of the elbow and down the back of the forearm. It may be worse when moving or using the elbow, forearm, or wrist. You may also feel pain when gripping or lifting things.  Treatment for lateral epicondylitis  Treatments may include:  · Resting the elbow, forearm, and wrist. Youll need to avoid movements that can make your symptoms worse. You also may need to avoid certain sports and types of work for a time. This helps relieve symptoms and prevent further damage to the tendons.  · Changing the action that caused the problem. For instance, if the tendons were damaged from playing tennis, it may help to change your playing technique or use different equipment. This helps prevent further damage to the tendons.  · Using cold packs. Putting an ice pack on the injured area can help reduce pain and swelling.  · Taking pain medicines. Taking prescription or  "over-the-counter pain medicines may help reduce pain and swelling.    · Wearing a brace. This helps reduce strain on the muscles and tendons in the forearm, which may relieve symptoms. It is very important to wear the brace properly.  · Doing exercises and physical therapy. These help improve strength and range of motion in the elbow, forearm, and wrist.  · Getting shots of medicine into the injured area. These may help relieve symptoms for a time.  · Having surgery. This may be an option if other treatments fail to relieve symptoms. In many cases, the surgeon removes the damaged tissue.  ·   Possible complications of lateral epicondylitis  If the tendons involved dont heal properly, symptoms may return or get worse. To help prevent this, follow your treatment plan as directed.          Your treatment will depend on how inflamed your tendon is. The goal is to relieve your symptoms and help you regain full use of your elbow.      Counter-force Brace Option    The Counter-force or "Tennis Elbow" brace must be worn precisely as instructed and be removed when not engaged in activities that require wrist and hand function. If worn improperly, this brace can make symptoms worse.    WRIST brace option can be worn to allow resting of the tendons for 4-6 weeks.      The Wrist Brace can be worn throughout the day, at rest as well as during activities. It is effective when it is worn while participating in daily activities that have proven to be painful or provocative to your condition.      REMOVE THE BRACE AT LEAST 4 TIMES A DAY TO PERFORM GENTLE WRIST AROM FOR PREVENTING STIFFNESS.     PERFORM THE EXERCISES INSTRUCTED BY YOUR THERAPIST WHILE THE WRIST BRACE IS REMOVED.    IT IS OK TO SLEEP IN THE BRACE, BUT NOT NECESSARY.       PHASE I OF YOUR THERAPY IS DIRECTED AT HEALING THE INFLAMED AND/OR IRRITATED TISSUES.      Protecting Healing Tissues:   · Use larger joints and muscles when possible (resting objects in crook of " elbow)  · Stop activities before the point of discomfort  · Avoid activities that put strain on painful tissues and/or joints  · Avoid a tight or prolonged grasp on objects  · Avoid any lifting or gripping with elbow in extension  · If you must lift, lift with elbows bent at side, object should be held close to your body, and hands turned palm up.  Balance Rest and Activity:  · Take frequent, short breaks to stretch  · Avoid staying in one position for a long time  · Alternate heavy and light activities  · Eliminate unnecessary activities  Reduce the effort:  · Avoid excessive loads. Don't be afraid to ask for help. Use carts and tabletops to make tasks easier.  · Keep items nearby (such as keyboard) and store frequently used items in loweest shelves/cabinets  Helpful Tips: slide objects; use your palms instead of fingers, keeps heavy items close to your body, use larger handles, pens and pencils, look for lightweight options, use wheels or carts to roll objects                               [facility], OCCUPATIONAL THERAPY  HOME EXERCISE PROGRAM      Lateral Epicondylitis Conservative Management    Joint Protection:   Use larger joints and muscles when possible (resting objects in crook of elbow)   Stop activities before the point of discomfort   Avoid activities that put strain on painful or stiff joints   Avoid a tight or prolonged grasp on objects   Avoid any lifting or gripping with elbow in extension   If you must lift, lift with elbows bent at side, object close to you, and hands turned palm up. elbow extension  Balance Rest and Activity:   Take frequent, short breaks to stretch   Avoid staying in one position for a long time   Alternate heavy and light activities   Eliminate unnecessary activities  Reduce the effort:   Avoid excessive loads. Dont be afraid to ask for help. Use carts and tabletops to make tasks easier.   Keep items nearby (such as keyboard)  Helpful Tips: slide  objects; use your palms instead of fingers, keeps heavy items close to your body, use larger handles, pens and pencils, look for lightweight options, use wheels or carts to roll objects

## 2022-02-01 ENCOUNTER — CLINICAL SUPPORT (OUTPATIENT)
Dept: REHABILITATION | Facility: HOSPITAL | Age: 67
End: 2022-02-01
Payer: MEDICARE

## 2022-02-01 DIAGNOSIS — M25.521 ARTHRALGIA OF RIGHT ELBOW: ICD-10-CM

## 2022-02-01 DIAGNOSIS — R53.1 WEAKNESS: ICD-10-CM

## 2022-02-01 PROCEDURE — 97140 MANUAL THERAPY 1/> REGIONS: CPT | Mod: HCNC,PN

## 2022-02-01 PROCEDURE — 97530 THERAPEUTIC ACTIVITIES: CPT | Mod: HCNC,PN

## 2022-02-01 PROCEDURE — 97110 THERAPEUTIC EXERCISES: CPT | Mod: HCNC,PN

## 2022-02-01 NOTE — PROGRESS NOTES
Occupational Therapy Daily Treatment Note     Name: Priscilla [facility]  Clinic Number: [id number]    Therapy Diagnosis:   Encounter Diagnoses   Name Primary?    Weakness     Arthralgia of right elbow      Physician: Alicia    Visit Date: 2/1/2022  Medical Diagnosis: M77.11 (ICD-10-CM) - Lateral epicondylitis of right elbow  Physician Orders: eval and treat    2-3 x per week x 6 weeks   Consider dry needling      Evaluation Date: 1/26/2022  Insurance Authorization Period Expiration: through 1/18/23  Plan of Care from 1/26/2022 to 3/11/22   Surgery Date and Procedure: N/A  Date of Return to MD: needs scheduling    Visit # / Visits authorized: 2 / pending  Time In:1045  Time Out: 1145  Total Billable Time: 60 minutes  Charges: 2 manual, 1 therex, 1 therapeutic activities  Todays Amount: 4    Precautions:  Standard,  required    Subjective     Pt reports: UEs feel better since last session after taping.  she was compliant with home exercise program given last session.   Response to previous treatment:better pain since last session.  Functional change: less pain.    Pain: 5/10  Location: right elbow      Objective     Priscilla received the following manual therapy techniques for 30 minutes:   Edema mgmt w/ lymphatic drainage technique;  Deep soft tissue massage, cross friction massage to right upper extremity, using hand techniquesto increase blood flow/circulation, improve soft tissue extensability and decrease pain.  Trained patient on self-taping with K-tape, but she reported that she would prefer the OT provide taping for her instead. Taped patient's right lateral elbow.    Priscilla received therapeutic exercises for 15 minutes including:  -Training on forearm/wrist stretches for home use. Patient required extra time for translation and cues to avoid pushing into excessive pain.    Priscilla participated in dynamic functional therapeutic activities to improve functional performance for 15  minutes,  including:  -training on activity modification to limit activities which will cause additional damage to injured site.    Home Exercises and Education Provided     Education provided:   - As above.  - Progress towards goals     Written Home Exercises Provided: yes.  Exercises were reviewed and Priscilla was able to demonstrate them prior to the end of the session.  Priscilla demonstrated good  understanding of the HEP provided.   .   See EMR under Patient Instructions for exercises provided 2/1/2022.        Assessment     Pt would continue to benefit from skilled OT since she has already begun to decrease swelling and pain in right upper extremity compared to prior visit.  Priscilla is progressing well towards her goals and there are no updates to goals at this time. Pt prognosis is Good.     Pt will continue to benefit from skilled outpatient occupational therapy to address the deficits listed in the problem list on initial evaluation provide pt/family education and to maximize pt's level of independence in the home and community environment.     Anticipated barriers to occupational therapy: language    Pt's spiritual, cultural and educational needs considered and pt agreeable to plan of care and goals.    Goals:  STG's (4-6 weeks)  1)   Patient to be IND with Phase I of HEP and modalities for pain/edema managment.  2)   Pt to tolerate (<3/10 pain) advancing PREs with self-graded intensity and effective symptom monitoring.   3)  Patient to be IND with Orthotic use, wear and care precautions as needed for improving daytime and nighttime UE positioning as needed to protect healing tissues.  4)   Pt to report decreased episodes of pain in right UE during daytime activities with or without newly learned activity modifications.  5) Pt to report decreased episodes of abnormal sensation in right UE upon waking or during daytime positions.     LTG's (6-8 weeks):  1)  Increase right /lift tolerance (<3/10 pain) by 50% for advancing  participation in ADLs and IADL without further tendon irritation.  2)  Decrease complaints of pain to 2 out of 10 at worst to indicate follow through with effective home care and compliance with protecting healing tissues during advancing I/ADL and leisure activities.  3)  Pt to be (I) with Phase II of HEP and demonstrate understanding of appropriately grading home care with or without orthotics/positioning assist.  4)   Patient to score at least 20 points less on FOTO to demonstrate improved perception of functional UE use for return to near to prior level of function for ADLs and household management with or without newly learned activity modifications.     Plan   Train on ice massage, determine readiness for stage II.      RUBEN LOZANO, OT

## 2022-02-10 ENCOUNTER — CLINICAL SUPPORT (OUTPATIENT)
Dept: REHABILITATION | Facility: HOSPITAL | Age: 67
End: 2022-02-10
Payer: MEDICARE

## 2022-02-10 DIAGNOSIS — R53.1 WEAKNESS: ICD-10-CM

## 2022-02-10 DIAGNOSIS — M25.521 ARTHRALGIA OF RIGHT ELBOW: ICD-10-CM

## 2022-02-10 PROCEDURE — 97140 MANUAL THERAPY 1/> REGIONS: CPT | Mod: HCNC,PN

## 2022-02-10 PROCEDURE — 97022 WHIRLPOOL THERAPY: CPT | Mod: HCNC,PN

## 2022-02-10 PROCEDURE — 97110 THERAPEUTIC EXERCISES: CPT | Mod: HCNC,PN

## 2022-02-10 NOTE — PROGRESS NOTES
"  Occupational Therapy Daily Treatment Note     Name: Priscilla DWYER [facility] Number: [id number]    Therapy Diagnosis:   Encounter Diagnoses   Name Primary?    Weakness     Arthralgia of right elbow      Physician: Alicia    Visit Date: 2/10/2022  Medical Diagnosis: M77.11 (ICD-10-CM) - Lateral epicondylitis of right elbow  Physician Orders: eval and treat    2-3 x per week x 6 weeks   Consider dry needling      Evaluation Date: 1/26/2022  Insurance Authorization Period Expiration: through 1/18/23  Plan of Care from 1/26/2022 to 3/11/22   Surgery Date and Procedure: N/A  Date of Return to MD: needs scheduling    Visit # / Visits authorized: 3 / pending  Time In:1000  Time Out: 1045  Total Billable Time: 45 minutes  Charges: 1 manual, 1 therex, 1 fluido  Todays Amount: 3    Precautions:  Standard,  required    Subjective     Pt reports: UEs feel better since last session after taping.  she was compliant with home exercise program given last session.   Response to previous treatment:better pain since last session.  Functional change: less pain.    Pain: 5/10  Location: right elbow      Objective   Provided 10 minutes of supervised modalities.  - Fluidotherapy provided to increase localized blood flow which promotes tissue extensibility and healing.    Priscilla received the following manual therapy techniques for 15 minutes:   Edema mgmt w/ lymphatic drainage technique;  Deep soft tissue massage, cross friction massage to right upper extremity, using hand techniquesto increase blood flow/circulation, improve soft tissue extensability and decrease pain.  -deferred- Trained patient on self-taping with K-tape, but she reported that she would prefer the OT provide taping for her instead. Taped patient's right lateral elbow.    performed therapeutic exercises for 20 minutes including:  ASTYM stretches 1-3 3x30"   Nirschl's  2/10   Therband ER/Horizontal abduction Yellow  2/15   Flexbar eccentric strengthening Red  10x "   Radial nerve glides 10x   Snow angels - defer 10x   Eccentric wrist strengthening - defer 2#  2/10       Home Exercises and Education Provided     Education provided:   - As above.  - Progress towards goals     Written Home Exercises Provided: yes.  Exercises were reviewed and Priscilla was able to demonstrate them prior to the end of the session.  Priscilla demonstrated good  understanding of the HEP provided.   .   See EMR under Patient Instructions for exercises provided 2/1/2022.        Assessment   Less pain compared to last session. Pt would continue to benefit from skilled OT since she has already begun to decrease swelling and pain in right upper extremity compared to prior visit.  Priscilla is progressing well towards her goals and there are no updates to goals at this time. Pt prognosis is Good.     Pt will continue to benefit from skilled outpatient occupational therapy to address the deficits listed in the problem list on initial evaluation provide pt/family education and to maximize pt's level of independence in the home and community environment.     Anticipated barriers to occupational therapy: language    Pt's spiritual, cultural and educational needs considered and pt agreeable to plan of care and goals.    Goals:  STG's (4-6 weeks)  1)   Patient to be IND with Phase I of HEP and modalities for pain/edema managment.  2)   Pt to tolerate (<3/10 pain) advancing PREs with self-graded intensity and effective symptom monitoring.   3)  Patient to be IND with Orthotic use, wear and care precautions as needed for improving daytime and nighttime UE positioning as needed to protect healing tissues.  4)   Pt to report decreased episodes of pain in right UE during daytime activities with or without newly learned activity modifications.  5) Pt to report decreased episodes of abnormal sensation in right UE upon waking or during daytime positions.     LTG's (6-8 weeks):  1)  Increase right /lift tolerance (<3/10 pain)  by 50% for advancing participation in ADLs and IADL without further tendon irritation.  2)  Decrease complaints of pain to 2 out of 10 at worst to indicate follow through with effective home care and compliance with protecting healing tissues during advancing I/ADL and leisure activities.  3)  Pt to be (I) with Phase II of HEP and demonstrate understanding of appropriately grading home care with or without orthotics/positioning assist.  4)   Patient to score at least 20 points less on FOTO to demonstrate improved perception of functional UE use for return to near to prior level of function for ADLs and household management with or without newly learned activity modifications.     Plan   Train on ice massage, determine readiness for stage II.      RUBEN LOZANO, OT

## 2022-02-15 ENCOUNTER — CLINICAL SUPPORT (OUTPATIENT)
Dept: REHABILITATION | Facility: HOSPITAL | Age: 67
End: 2022-02-15
Payer: MEDICARE

## 2022-02-15 DIAGNOSIS — R53.1 WEAKNESS: ICD-10-CM

## 2022-02-15 DIAGNOSIS — M25.521 ARTHRALGIA OF RIGHT ELBOW: ICD-10-CM

## 2022-02-15 PROCEDURE — 97110 THERAPEUTIC EXERCISES: CPT | Mod: HCNC,PN

## 2022-02-15 PROCEDURE — 97140 MANUAL THERAPY 1/> REGIONS: CPT | Mod: HCNC,PN

## 2022-02-15 NOTE — PROGRESS NOTES
"    Occupational Therapy Daily Treatment Note     Name: Priscilla DWYER [facility] Number: [id number]    Therapy Diagnosis:   Encounter Diagnoses   Name Primary?    Weakness     Arthralgia of right elbow      Physician: Alicia    Visit Date: 2/15/2022  Medical Diagnosis: M77.11 (ICD-10-CM) - Lateral epicondylitis of right elbow  Physician Orders: eval and treat    2-3 x per week x 6 weeks   Consider dry needling      Evaluation Date: 1/26/2022  Insurance Authorization Period Expiration: through 1/18/23  Plan of Care from 1/26/2022 to 3/11/22   Surgery Date and Procedure: N/A  Date of Return to MD: needs scheduling    Visit # / Visits authorized: 4 / pending  Time In:1045  Time Out: 1130  Total Billable Time: 45 minutes  Charges: 2 manual, 1 therex  Todays Amount: 3    Precautions:  Standard,  required    Subjective     Pt reports: She hit her forearm against the door at home over the weekend, but is otherwise okay. She is going to try going without pain medication this week.  she was compliant with home exercise program given last session.   Response to previous treatment:better pain since last session.  Functional change: less pain.    Pain: 5/10  Location: right elbow      Objective     Priscilla received the following manual therapy techniques for 30 minutes:   Edema mgmt w/ lymphatic drainage technique;  Deep soft tissue massage, cross friction massage to right upper extremity, using hand techniquesto increase blood flow/circulation, improve soft tissue extensability and decrease pain.  Taping of right lateral elbow for pain control. She also complained of dorsal wrist soreness so OT taped wrist circumferentially as well.    Priscilla received therapeutic exercises for 15 minutes including:  ASTYM stretches 1-3 3x30"   Nirschl's  2/10   Therband ER/Horizontal abduction -Deferred Yellow  2/15   Flexbar eccentric strengthening Red  10x   Radial nerve glides  -Deferred 10x   Snow angels - defer 10x   Eccentric wrist " strengthening - defer 2#  2/10      Home Exercises and Education Provided     Education provided:   - As above.  - Progress towards goals     Written Home Exercises Provided: yes.  Exercises were reviewed and Priscilla was able to demonstrate them prior to the end of the session.  Priscilla demonstrated good  understanding of the HEP provided.   .   See EMR under Patient Instructions for exercises provided 2/1/2022.        Assessment   Pt would continue to benefit from skilled OT since she pain is on the decline.  Priscilla is progressing well towards her goals and there are no updates to goals at this time. Pt prognosis is Good.  Pt will continue to benefit from skilled outpatient occupational therapy to address the deficits listed in the problem list on initial evaluation provide pt/family education and to maximize pt's level of independence in the home and community environment.     Anticipated barriers to occupational therapy: language    Pt's spiritual, cultural and educational needs considered and pt agreeable to plan of care and goals.    Goals:  STG's (4-6 weeks)  1)   Patient to be IND with Phase I of HEP and modalities for pain/edema managment.  2)   Pt to tolerate (<3/10 pain) advancing PREs with self-graded intensity and effective symptom monitoring.   3)  Patient to be IND with Orthotic use, wear and care precautions as needed for improving daytime and nighttime UE positioning as needed to protect healing tissues.  4)   Pt to report decreased episodes of pain in right UE during daytime activities with or without newly learned activity modifications.  5) Pt to report decreased episodes of abnormal sensation in right UE upon waking or during daytime positions.     LTG's (6-8 weeks):  1)  Increase right /lift tolerance (<3/10 pain) by 50% for advancing participation in ADLs and IADL without further tendon irritation.  2)  Decrease complaints of pain to 2 out of 10 at worst to indicate follow through with effective  home care and compliance with protecting healing tissues during advancing I/ADL and leisure activities.  3)  Pt to be (I) with Phase II of HEP and demonstrate understanding of appropriately grading home care with or without orthotics/positioning assist.  4)   Patient to score at least 20 points less on FOTO to demonstrate improved perception of functional UE use for return to near to prior level of function for ADLs and household management with or without newly learned activity modifications.     Plan   Train on ice massage, determine readiness for stage II.      RUBEN LOZANO, OT

## 2022-02-17 ENCOUNTER — CLINICAL SUPPORT (OUTPATIENT)
Dept: REHABILITATION | Facility: HOSPITAL | Age: 67
End: 2022-02-17
Payer: MEDICARE

## 2022-02-17 DIAGNOSIS — R53.1 WEAKNESS: ICD-10-CM

## 2022-02-17 DIAGNOSIS — M25.521 ARTHRALGIA OF RIGHT ELBOW: ICD-10-CM

## 2022-02-17 PROCEDURE — 97110 THERAPEUTIC EXERCISES: CPT | Mod: HCNC,PN

## 2022-02-17 PROCEDURE — 97140 MANUAL THERAPY 1/> REGIONS: CPT | Mod: HCNC,PN

## 2022-02-17 NOTE — PROGRESS NOTES
"    Occupational Therapy Daily Treatment Note     Name: Priscilla DWYER [facility] Number: [id number]    Therapy Diagnosis:   Encounter Diagnoses   Name Primary?    Weakness     Arthralgia of right elbow      Physician: Alicia    Visit Date: 2/17/2022  Medical Diagnosis: M77.11 (ICD-10-CM) - Lateral epicondylitis of right elbow  Physician Orders: eval and treat    2-3 x per week x 6 weeks   Consider dry needling      Evaluation Date: 1/26/2022  Insurance Authorization Period Expiration: through 1/18/23  Plan of Care from 1/26/2022 to 3/11/22   Surgery Date and Procedure: N/A  Date of Return to MD: needs scheduling    Visit # / Visits authorized: 5 / pending  Time In:1045  Time Out: 1130  Total Billable Time: 45 minutes  Charges: 2 manual, 1 therex  Todays Amount: 3    Precautions:  Standard,  required    Subjective     Pt reports: Wrist is starting to bother her, but she doesn't know why. Otherwise, she is doing well.  she was compliant with home exercise program given last session.   Response to previous treatment:better pain since last session.  Functional change: less pain.    Pain: 3/10  Location: right elbow      Objective     Priscilla received the following manual therapy techniques for 30 minutes:   Edema mgmt w/ lymphatic drainage technique;  Deep soft tissue massage, cross friction massage to right upper extremity, using hand techniquesto increase blood flow/circulation, improve soft tissue extensability and decrease pain.  Taping of right lateral elbow for pain control. She also complained of dorsal wrist soreness so OT taped wrist circumferentially as well.    Priscilla received therapeutic exercises for 15 minutes including:  ASTYM stretches 1-3 3x30"   Nirschl's  2/10   Therband ER/Horizontal abduction Yellow  2/15   Flexbar eccentric strengthening Red  10x   Radial nerve glides 10x   Snow angels 2 [id number]   Eccentric wrist strengthening - defer 2#  2/10      Home Exercises and Education Provided "     Education provided:   - As above.  - Progress towards goals     Written Home Exercises Provided: yes.  Exercises were reviewed and Priscilla was able to demonstrate them prior to the end of the session.  Priscilla demonstrated good  understanding of the HEP provided.   .   See EMR under Patient Instructions for exercises provided 2/1/2022.        Assessment   Pt would continue to benefit from skilled OT since she pain is on the decline.  Priscilla is progressing well towards her goals and there are no updates to goals at this time. Pt prognosis is Good.  Pt will continue to benefit from skilled outpatient occupational therapy to address the deficits listed in the problem list on initial evaluation provide pt/family education and to maximize pt's level of independence in the home and community environment.     Anticipated barriers to occupational therapy: language    Pt's spiritual, cultural and educational needs considered and pt agreeable to plan of care and goals.    Goals:  STG's (4-6 weeks)  1)   Patient to be IND with Phase I of HEP and modalities for pain/edema managment.  2)   Pt to tolerate (<3/10 pain) advancing PREs with self-graded intensity and effective symptom monitoring.   3)  Patient to be IND with Orthotic use, wear and care precautions as needed for improving daytime and nighttime UE positioning as needed to protect healing tissues.  4)   Pt to report decreased episodes of pain in right UE during daytime activities with or without newly learned activity modifications.  5) Pt to report decreased episodes of abnormal sensation in right UE upon waking or during daytime positions.     LTG's (6-8 weeks):  1)  Increase right /lift tolerance (<3/10 pain) by 50% for advancing participation in ADLs and IADL without further tendon irritation.  2)  Decrease complaints of pain to 2 out of 10 at worst to indicate follow through with effective home care and compliance with protecting healing tissues during advancing  I/ADL and leisure activities.  3)  Pt to be (I) with Phase II of HEP and demonstrate understanding of appropriately grading home care with or without orthotics/positioning assist.  4)   Patient to score at least 20 points less on FOTO to demonstrate improved perception of functional UE use for return to near to prior level of function for ADLs and household management with or without newly learned activity modifications.     Plan   Continued PREs with respect to pain control.  Determine what changes she has made to limit risk of re-injury at home.    RUBEN LOZANO, OT

## 2022-02-22 ENCOUNTER — CLINICAL SUPPORT (OUTPATIENT)
Dept: REHABILITATION | Facility: HOSPITAL | Age: 67
End: 2022-02-22
Payer: MEDICARE

## 2022-02-22 DIAGNOSIS — M25.521 ARTHRALGIA OF RIGHT ELBOW: ICD-10-CM

## 2022-02-22 DIAGNOSIS — R53.1 WEAKNESS: Primary | ICD-10-CM

## 2022-02-22 PROCEDURE — 97110 THERAPEUTIC EXERCISES: CPT | Mod: PN

## 2022-02-22 PROCEDURE — 97140 MANUAL THERAPY 1/> REGIONS: CPT | Mod: PN

## 2022-02-22 NOTE — PROGRESS NOTES
Occupational Therapy Daily Treatment Note     Name: Priscilla DWYER [facility]  Clinic Number: [id number]    Therapy Diagnosis:   Encounter Diagnoses   Name Primary?    Weakness Yes    Arthralgia of right elbow      Physician: Alicia    Visit Date: 2/22/2022  Medical Diagnosis: M77.11 (ICD-10-CM) - Lateral epicondylitis of right elbow  Physician Orders: eval and treat    2-3 x per week x 6 weeks   Consider dry needling      Evaluation Date: 1/26/2022  Insurance Authorization Period Expiration: through 1/18/23  Plan of Care from 1/26/2022 to 3/11/22   Surgery Date and Procedure: N/A  Date of Return to MD: needs scheduling    Visit # / Visits authorized: 6 / pending  Time In:1045  Time Out: 1130  Total Billable Time: 45 minutes  Charges: 2 manual, 1 therex  Todays Amount: 3    Precautions:  Standard,  required    Subjective     Pt reports: Much improved pain. Only hurts when she lifts something heavy.  she was compliant with home exercise program given last session.   Response to previous treatment:better pain since last session.  Functional change: able to lift more.    Pain: 0/10  Location: right elbow      Objective     Priscilla received the following manual therapy techniques for 15 minutes:   Edema mgmt w/ lymphatic drainage technique;  Deep soft tissue massage, cross friction massage to right upper extremity, using hand techniquesto increase blood flow/circulation, improve soft tissue extensability and decrease pain.  Taping of right lateral elbow for pain control. She also complained of dorsal wrist soreness so OT taped wrist circumferentially as well.    Priscilla received therapeutic exercises for 30 minutes including:  ASTYM stretches 1-3 3x66s   Nirschl's  2/10   Therband ER/Horizontal abduction Red  2/15   Flexbar eccentric strengthening Red --> Green  10x --> 8x   Radial nerve glides 10x   Snow angels - defer 2 [id number]   Eccentric wrist strengthening 2#  2/10   Elbow strengthening 3#  2/10      Home Exercises  and Education Provided     Education provided:   - As above.  - Progress towards goals     Written Home Exercises Provided: yes.  Exercises were reviewed and Priscilla was able to demonstrate them prior to the end of the session.  Priscilla demonstrated good  understanding of the HEP provided.   .   See EMR under Patient Instructions for exercises provided 2/1/2022.        Assessment   Priscilla's pain is on the decline and she is tolerating advances in resistance exercises.  Priscilla is progressing well towards her goals and there are no updates to goals at this time. Pt prognosis is Good.  Pt will continue to benefit from skilled outpatient occupational therapy to address the deficits listed in the problem list on initial evaluation provide pt/family education and to maximize pt's level of independence in the home and community environment.     Anticipated barriers to occupational therapy: language    Pt's spiritual, cultural and educational needs considered and pt agreeable to plan of care and goals.    Goals:  STG's (4-6 weeks)  1)   Patient to be IND with Phase I of HEP and modalities for pain/edema managment.  2)   Pt to tolerate (<3/10 pain) advancing PREs with self-graded intensity and effective symptom monitoring.   3)  Patient to be IND with Orthotic use, wear and care precautions as needed for improving daytime and nighttime UE positioning as needed to protect healing tissues.  4)   Pt to report decreased episodes of pain in right UE during daytime activities with or without newly learned activity modifications.  5) Pt to report decreased episodes of abnormal sensation in right UE upon waking or during daytime positions.     LTG's (6-8 weeks):  1)  Increase right /lift tolerance (<3/10 pain) by 50% for advancing participation in ADLs and IADL without further tendon irritation.  2)  Decrease complaints of pain to 2 out of 10 at worst to indicate follow through with effective home care and compliance with protecting  healing tissues during advancing I/ADL and leisure activities.  3)  Pt to be (I) with Phase II of HEP and demonstrate understanding of appropriately grading home care with or without orthotics/positioning assist.  4)   Patient to score at least 20 points less on FOTO to demonstrate improved perception of functional UE use for return to near to prior level of function for ADLs and household management with or without newly learned activity modifications.     Plan   Continued PREs with respect to pain control.  Determine what changes she has made to limit risk of re-injury at home.    RUBEN LOZANO, OT

## 2022-02-23 DIAGNOSIS — D84.9 IMMUNOSUPPRESSED STATUS: ICD-10-CM

## 2022-02-24 ENCOUNTER — CLINICAL SUPPORT (OUTPATIENT)
Dept: REHABILITATION | Facility: HOSPITAL | Age: 67
End: 2022-02-24
Payer: MEDICARE

## 2022-02-24 DIAGNOSIS — R53.1 WEAKNESS: Primary | ICD-10-CM

## 2022-02-24 DIAGNOSIS — M25.522 ARTHRALGIA OF LEFT ELBOW: ICD-10-CM

## 2022-02-24 PROCEDURE — 97140 MANUAL THERAPY 1/> REGIONS: CPT | Mod: PN

## 2022-02-24 PROCEDURE — 97110 THERAPEUTIC EXERCISES: CPT | Mod: PN

## 2022-02-24 NOTE — PROGRESS NOTES
Occupational Therapy Daily Treatment Note     Name: Priscilla DWYER [facility]  Clinic Number: [id number]    Therapy Diagnosis:   Encounter Diagnoses   Name Primary?    Weakness Yes    Arthralgia of left elbow      Physician: Alicia    Visit Date: 2/24/2022  Medical Diagnosis: M77.11 (ICD-10-CM) - Lateral epicondylitis of right elbow  Physician Orders: eval and treat    2-3 x per week x 6 weeks   Consider dry needling      Evaluation Date: 1/26/2022  Insurance Authorization Period Expiration: through 1/18/23  Plan of Care from 1/26/2022 to 3/11/22   Surgery Date and Procedure: N/A  Date of Return to MD: needs scheduling    Visit # / Visits authorized: 7 / pending  Time In:1000  Time Out: 1045  Total Billable Time: 45 minutes  Charges: 2 manual, 1 therex  Todays Amount: 3    Precautions:  Standard,  required    Subjective     Pt reports: Much improved pain. Only hurts when she lifts something heavy.  she was compliant with home exercise program given last session.   Response to previous treatment:better pain since last session.  Functional change: able to lift more.    Pain: 2/10  Location: right elbow      Objective     Priscilla received the following manual therapy techniques for 15 minutes:   Edema mgmt w/ lymphatic drainage technique;  Deep soft tissue massage, cross friction massage to right upper extremity, using hand techniquesto increase blood flow/circulation, improve soft tissue extensability and decrease pain.  Taping of right lateral elbow for pain control. She also complained of dorsal wrist soreness so OT taped wrist circumferentially as well.    Priscilla received therapeutic exercises for 30 minutes including:  ASTYM stretches 1-3 3x66s   Nirschl's  2/10   Therband ER/Horizontal abduction Red  2/15   Flexbar eccentric strengthening Green  15x2   Scapular pinches 15x with 5 sec hold   Radial nerve glides 10x   Snow angels - defer 2 [id number]   Eccentric wrist strengthening 2#  2/10   Elbow strengthening -  defer 3#  2/10      Home Exercises and Education Provided     Education provided:   - As above.  - Progress towards goals     Written Home Exercises Provided: yes.  Exercises were reviewed and Priscilla was able to demonstrate them prior to the end of the session.  Priscilla demonstrated good  understanding of the HEP provided.   .   See EMR under Patient Instructions for exercises provided 2/1/2022.        Assessment   Priscilla's pain was higher today, but she felt much better by end of session. She also tolerate increase in resistance to flexbar.   Priscilla is progressing well towards her goals and there are no updates to goals at this time. Pt prognosis is Good.  Pt will continue to benefit from skilled outpatient occupational therapy to address the deficits listed in the problem list on initial evaluation provide pt/family education and to maximize pt's level of independence in the home and community environment.     Anticipated barriers to occupational therapy: language    Pt's spiritual, cultural and educational needs considered and pt agreeable to plan of care and goals.    Goals:  STG's (4-6 weeks)  1)   Patient to be IND with Phase I of HEP and modalities for pain/edema managment.  2)   Pt to tolerate (<3/10 pain) advancing PREs with self-graded intensity and effective symptom monitoring.   3)  Patient to be IND with Orthotic use, wear and care precautions as needed for improving daytime and nighttime UE positioning as needed to protect healing tissues.  4)   Pt to report decreased episodes of pain in right UE during daytime activities with or without newly learned activity modifications.  5) Pt to report decreased episodes of abnormal sensation in right UE upon waking or during daytime positions.     LTG's (6-8 weeks):  1)  Increase right /lift tolerance (<3/10 pain) by 50% for advancing participation in ADLs and IADL without further tendon irritation.  2)  Decrease complaints of pain to 2 out of 10 at worst to  indicate follow through with effective home care and compliance with protecting healing tissues during advancing I/ADL and leisure activities.  3)  Pt to be (I) with Phase II of HEP and demonstrate understanding of appropriately grading home care with or without orthotics/positioning assist.  4)   Patient to score at least 20 points less on FOTO to demonstrate improved perception of functional UE use for return to near to prior level of function for ADLs and household management with or without newly learned activity modifications.     Plan   Continued PREs with respect to pain control.  Determine what changes she has made to limit risk of re-injury at home.    RUBEN LOZANO, OT

## 2022-03-03 ENCOUNTER — CLINICAL SUPPORT (OUTPATIENT)
Dept: REHABILITATION | Facility: HOSPITAL | Age: 67
End: 2022-03-03
Payer: MEDICARE

## 2022-03-03 DIAGNOSIS — M25.521 ARTHRALGIA OF RIGHT ELBOW: ICD-10-CM

## 2022-03-03 DIAGNOSIS — R53.1 WEAKNESS: Primary | ICD-10-CM

## 2022-03-03 PROCEDURE — 97110 THERAPEUTIC EXERCISES: CPT | Mod: PN

## 2022-03-03 PROCEDURE — 97140 MANUAL THERAPY 1/> REGIONS: CPT | Mod: PN

## 2022-03-03 NOTE — PROGRESS NOTES
Occupational Therapy Daily Treatment Note     Name: Priscilla DWYER [facility]  Clinic Number: [id number]    Therapy Diagnosis:   Encounter Diagnoses   Name Primary?    Weakness Yes    Arthralgia of right elbow      Physician: Alicia    Visit Date: 3/3/2022  Medical Diagnosis: M77.11 (ICD-10-CM) - Lateral epicondylitis of right elbow  Physician Orders: eval and treat    2-3 x per week x 6 weeks   Consider dry needling      Evaluation Date: 1/26/2022  Insurance Authorization Period Expiration: through 1/18/23  Plan of Care from 1/26/2022 to 3/11/22   Surgery Date and Procedure: N/A  Date of Return to MD: needs scheduling    Visit # / Visits authorized: 8 / pending  Time In:1000  Time Out: 1045  Total Billable Time: 45 minutes  Charges: 1 manual, 2 therex  Todays Amount: 3    Precautions:  Standard,  required    Subjective     Pt reports: Much improved pain. Only hurts when she lifts something heavy.  she was compliant with home exercise program given last session.   Response to previous treatment:better pain since last session.  Functional change: able to lift more.    Pain: 2/10  Location: right elbow      Objective     Priscilla received the following manual therapy techniques for 15 minutes:   Edema mgmt w/ lymphatic drainage technique;  Deep soft tissue massage, cross friction massage to right upper extremity, using hand techniquesto increase blood flow/circulation, improve soft tissue extensability and decrease pain.  Taping of right lateral elbow for pain control.     Priscilla received therapeutic exercises for 30 minutes including:  ASTYM stretches 1-3 3x66s   Nirschl's  2/10   Therband ER/Horizontal abduction Defer to home program   Flexbar eccentric strengthening Green  15x2   Scapular pinches 15x with 5 sec hold   Radial nerve glides 10x 2   Snow angels - defer 2 [id number]   Eccentric wrist strengthening - defer 2#  2/10   Elbow strengthening 3#  2/10      Home Exercises and Education Provided     Education  provided:   - As above.  - Progress towards goals     Written Home Exercises Provided: yes.  Exercises were reviewed and Priscilla was able to demonstrate them prior to the end of the session.  Priscilla demonstrated good  understanding of the HEP provided.   .   See EMR under Patient Instructions for exercises provided 2/1/2022.        Assessment   Priscilla's pain is always better after k-taping, so she has agreed to consider who she could bring in and train from home or her [place] friends for training.  Priscilla is progressing well towards her goals and there are no updates to goals at this time. Pt prognosis is Good.  Pt will continue to benefit from skilled outpatient occupational therapy to address the deficits listed in the problem list on initial evaluation provide pt/family education and to maximize pt's level of independence in the home and community environment.     Anticipated barriers to occupational therapy: language    Pt's spiritual, cultural and educational needs considered and pt agreeable to plan of care and goals.    Goals:  STG's (4-6 weeks)  1)   Patient to be IND with Phase I of HEP and modalities for pain/edema managment.  2)   Pt to tolerate (<3/10 pain) advancing PREs with self-graded intensity and effective symptom monitoring.   3)  Patient to be IND with Orthotic use, wear and care precautions as needed for improving daytime and nighttime UE positioning as needed to protect healing tissues.  4)   Pt to report decreased episodes of pain in right UE during daytime activities with or without newly learned activity modifications.  5) Pt to report decreased episodes of abnormal sensation in right UE upon waking or during daytime positions.     LTG's (6-8 weeks):  1)  Increase right /lift tolerance (<3/10 pain) by 50% for advancing participation in ADLs and IADL without further tendon irritation.  2)  Decrease complaints of pain to 2 out of 10 at worst to indicate follow through with effective home  care and compliance with protecting healing tissues during advancing I/ADL and leisure activities.  3)  Pt to be (I) with Phase II of HEP and demonstrate understanding of appropriately grading home care with or without orthotics/positioning assist.  4)   Patient to score at least 20 points less on FOTO to demonstrate improved perception of functional UE use for return to near to prior level of function for ADLs and household management with or without newly learned activity modifications.     Plan   Continued PREs with respect to pain control.  Determine what changes she has made to limit risk of re-injury at home.    RUBEN LOZANO, OT

## 2022-03-08 ENCOUNTER — TELEPHONE (OUTPATIENT)
Dept: REHABILITATION | Facility: HOSPITAL | Age: 67
End: 2022-03-08
Payer: MEDICARE

## 2022-03-08 NOTE — TELEPHONE ENCOUNTER
Phoned pt about today's missed OT appt. She reported she did not have the number to call and cancel. Clinic number was provided and pt confirmed Thursday 3/10/2022 appt at 10 am

## 2022-03-10 ENCOUNTER — CLINICAL SUPPORT (OUTPATIENT)
Dept: REHABILITATION | Facility: HOSPITAL | Age: 67
End: 2022-03-10
Payer: MEDICARE

## 2022-03-10 DIAGNOSIS — R52 PAIN AGGRAVATED BY ACTIVITIES OF DAILY LIVING: ICD-10-CM

## 2022-03-10 DIAGNOSIS — M25.521 ARTHRALGIA OF RIGHT ELBOW: ICD-10-CM

## 2022-03-10 DIAGNOSIS — R53.1 WEAKNESS: Primary | ICD-10-CM

## 2022-03-10 PROCEDURE — 97110 THERAPEUTIC EXERCISES: CPT | Mod: PN

## 2022-03-10 PROCEDURE — 97140 MANUAL THERAPY 1/> REGIONS: CPT | Mod: PN

## 2022-03-10 PROCEDURE — 97535 SELF CARE MNGMENT TRAINING: CPT | Mod: PN

## 2022-03-10 NOTE — PROGRESS NOTES
Occupational Therapy Daily Treatment Note     Name: Priscilla DWYER [facility] Number: [id number]    Therapy Diagnosis:   Encounter Diagnoses   Name Primary?    Weakness Yes    Arthralgia of right elbow      Physician: Alicia    Visit Date: 3/10/2022  Medical Diagnosis: M77.11 (ICD-10-CM) - Lateral epicondylitis of right elbow  Physician Orders: eval and treat    2-3 x per week x 6 weeks   Consider dry needling      Evaluation Date: 1/26/2022  Insurance Authorization Period Expiration: through 1/18/23  Plan of Care from 1/26/2022 to 3/11/22   Surgery Date and Procedure: N/A  Date of Return to MD: needs scheduling    Visit # / Visits authorized: 9/ Medicare 2022  FOTO on 3/10/22: 32.4%    Time In: 10:30 am  Time Out: 11:35 am  Total Billable Time: 40 minutes    Precautions:  Standard,  required    Subjective     Pt reports: 3/10: Pt reports now having decreased episodes of pain with lifting. Pt does not wear a wrist brace. She is continuing to have pain when lifting her purse and groceries. Pt received full and in-depth translation of HEP and home care. She ordered a wrist brace during her session today.   she was compliant with home exercise program given last session.   Response to previous treatment: Pt reports no residual pain from last session.   Functional change: able to lift more.    Pain: 2/10  Location: right elbow      Objective     Priscilla received the following manual therapy techniques for 10 minutes:   - IASTM Right lateral elbow for promoting healing  - Elastic tape applied at common extensor tendon with space correction (point of pain) for pain and edema mgmt    Priscilla received therapeutic exercises for 8 minutes including:  ASTYM stretches 1-3 3x66s   Nirschl's  2/10   Therband ER/Horizontal abduction Defer to home program   Flexbar eccentric strengthening - defer Green  15x2   Scapular pinches - defer 15x with 5 sec hold   Radial nerve glides- defr 10x 2   Snow angels - defer 2 [id number]    Eccentric wrist strengthening - defer 2#  2/10   Elbow strengthening - defer 3#  2/10   HEP as set 3/10/22 - Reviewed for rationale, assisted by  with illustrations and return demonstration      Priscilla received Self Care instructions and Orthosis Management for 20 minutes and participated in an ongoing and concurrent discussion of re/evaluation findings and specific home care, including:  - education related to the tissue involvement as evidenced by MD diagnosis, positive response during provative movements and/or special testing.  - the anatomy and pathophysiology of the tissue involvement.  - instruction in activity modifications, joint protection and Energy conservation strategies for goals of protecting healing tissues and/or reduce episodes of provocative activities/postures to include:  - use of alternative strategy, positioning or assistive devices for frequently handled self care items such as for cell phone use; pt noted to use her cell phone throughout this session, engaging wrist and digit extensions.  - defer-use of voice recognition software when available  - Improving awareness of posture while participating in all lifting tasks  - Rationale for wearing wrist brace during activities that have proven provocative    Home Exercises and Education Provided     Education provided:   - See Self Care Discussion above  - Progress towards goals   - Insurance limitaitions    Written Home Exercises Provided: yes.  Exercises were reviewed and Priscilla was able to demonstrate them prior to the end of the session.  Priscilla demonstrated good  understanding of the HEP provided.     See EMR under Patient Instructions for exercises provided 2/1/2022. 3/10/22       Assessment   3/10: Pt did not arrive with a caregiver for teaching tape strategies as considered last session. She appears to have improved understanding of home care specific to protecting healing tissues. Overall pain and episodes of pain are  reduced.    Priscilla is progressing well towards her goals and there are no updates to goals at this time. Pt prognosis is Good.  Pt will continue to benefit from skilled outpatient occupational therapy to address the deficits listed in the problem list on initial evaluation provide pt/family education and to maximize pt's level of independence in the home and community environment.     Anticipated barriers to occupational therapy: language    Pt's spiritual, cultural and educational needs considered and pt agreeable to plan of care and goals.    Goals:  STG's (4-6 weeks)  1)   Patient to be IND with Phase I of HEP and modalities for pain/edema managment.  2)   Pt to tolerate (<3/10 pain) advancing PREs with self-graded intensity and effective symptom monitoring.   3)  Patient to be IND with Orthotic use, wear and care precautions as needed for improving daytime and nighttime UE positioning as needed to protect healing tissues.  4)   Pt to report decreased episodes of pain in right UE during daytime activities with or without newly learned activity modifications.  5) Pt to report decreased episodes of abnormal sensation in right UE upon waking or during daytime positions.     LTG's (6-8 weeks):  1)  Increase right /lift tolerance (<3/10 pain) by 50% for advancing participation in ADLs and IADL without further tendon irritation. Progressing  2)  Decrease complaints of pain to 2 out of 10 at worst to indicate follow through with effective home care and compliance with protecting healing tissues during advancing I/ADL and leisure activities. Progressing  3)  Pt to be (I) with Phase II of HEP and demonstrate understanding of appropriately grading home care with or without orthotics/positioning assist.  4)   Patient to score at least 20 points less on FOTO to demonstrate improved perception of functional UE use for return to near to prior level of function for ADLs and household management with or without newly learned  activity modifications. Progressing    Plan   3/10: Recommending patient return to protective phase of OT. Continue to clarify home care importance for effectively healing irritated tissues. Provide special testing for further assessment of all tissues involved. Assess results of initiating wrist splint wear.     Pt to be treated by Occupational Therapy 2 times per week for 6 weeks during the certification period from 1/26/2022 to 3/11/22 to achieve the established goals.      Treatment to include: Paraffin, Fluidotherapy, Manual therapy/joint mobilizations, Modalities for pain management, US 3 mhz, Therapeutic exercises/activities., Orthotic Fabrication/Fit/Training, Edema Control, Joint Protection and Energy Conservation, as well as any other treatments deemed necessary based on the patient's needs or progress.     JASKARAN Fuentes, SIMT  Occupational Therapist, Certified Hand Therapist

## 2022-03-10 NOTE — PATIENT INSTRUCTIONS
Understanding Lateral Epicondylitis    Tendons are strong bands of tissue that connect muscles to bones. Lateral epicondylitis affects the tendons that connect muscles in the forearm to the lateral epicondyle. This is the bony knob on the outer side of the elbow. The condition occurs if the extensor tendons of the wrist become red and swollen (irritated). This can cause pain in the elbow, forearm, and wrist. Because the condition is sometimes caused by playing tennis, it is also known as tennis elbow.  How to say it  LA-tuhr-marcel ja-[id number]-EYW-duh-LY-tis   What causes lateral epicondylitis?  The condition most often occurs because of overuse. This can be from any activity that repeatedly puts stress on the forearm extensor muscles or tendons and wrist. For instance, playing tennis, lifting weights, cutting meat, painting, and typing can all cause the condition. Wear and tear of the tendons from aging or an injury to the tendons can also cause the condition.  Symptoms of lateral epicondylitis  The most common symptom is pain. You may feel it on the outer side of the elbow and down the back of the forearm. It may be worse when moving or using the elbow, forearm, or wrist. You may also feel pain when gripping or lifting things.  Treatment for lateral epicondylitis  Treatments may include:  Resting the elbow, forearm, and wrist. Youll need to avoid movements that can make your symptoms worse. You also may need to avoid certain sports and types of work for a time. This helps relieve symptoms and prevent further damage to the tendons.  Changing the action that caused the problem. For instance, if the tendons were damaged from playing tennis, it may help to change your playing technique or use different equipment. This helps prevent further damage to the tendons.  Using cold packs. Putting an ice pack on the injured area can help reduce pain and swelling.  Taking pain medicines. Taking prescription or over-the-counter pain  medicines may help reduce pain and swelling.    Wearing a brace. This helps reduce strain on the muscles and tendons in the forearm, which may relieve symptoms. It is very important to wear the brace properly.  Doing exercises and physical therapy. These help improve strength and range of motion in the elbow, forearm, and wrist.  Getting shots of medicine into the injured area. These may help relieve symptoms for a time.  Having surgery. This may be an option if other treatments fail to relieve symptoms. In many cases, the surgeon removes the damaged tissue.    Possible complications of lateral epicondylitis  If the tendons involved dont heal properly, symptoms may return or get worse. To help prevent this, follow your treatment plan as directed.    Your treatment will depend on how inflamed your tendon is. The goal is to relieve your symptoms and help you regain full use of your elbow.    WRIST brace option can be worn to allow resting of the tendons for 4-6 weeks.      The Wrist Brace can be worn throughout the day, at rest as well as during activities. It is effective when it is worn while participating in daily activities that have proven to be painful or provocative to your condition.      REMOVE THE BRACE AT LEAST 4 TIMES A DAY TO PERFORM GENTLE WRIST AROM FOR PREVENTING STIFFNESS.     PERFORM THE EXERCISES INSTRUCTED BY YOUR THERAPIST WHILE THE WRIST BRACE IS REMOVED.    IT IS OK TO SLEEP IN THE BRACE, BUT NOT NECESSARY.       PHASE I OF YOUR THERAPY IS DIRECTED AT HEALING THE INFLAMED AND/OR IRRITATED TISSUES.      Protecting Healing Tissues:   Use larger joints and muscles when possible (resting objects in crook of elbow)  Stop activities before the point of discomfort  Avoid activities that put strain on painful tissues and/or joints  Avoid a tight or prolonged grasp on objects  Avoid any lifting or gripping with elbow in extension  If you must lift, lift with elbows bent at side, object should be held  close to your body, and hands turned palm up.  Balance Rest and Activity:  Take frequent, short breaks to stretch  Avoid staying in one position for a long time  Alternate heavy and light activities  Eliminate unnecessary activities  Reduce the effort:  Avoid excessive loads. Don't be afraid to ask for help. Use carts and tabletops to make tasks easier.  Keep items nearby (such as keyboard) and store frequently used items in loweest shelves/cabinets  Helpful Tips: slide objects; use your palms instead of fingers, keeps heavy items close to your body, use larger handles, pens and pencils, look for lightweight options, use wheels or carts to roll objects

## 2022-03-29 ENCOUNTER — TELEPHONE (OUTPATIENT)
Dept: FAMILY MEDICINE | Facility: CLINIC | Age: 67
End: 2022-03-29
Payer: MEDICARE

## 2022-03-29 NOTE — TELEPHONE ENCOUNTER
----- Message from Ysabel Benitez sent at 3/29/2022  9:21 AM CDT -----  Type:  [facility] Requesting call back    Who Called:[place] on behalf of Priscilla Lim  Would the patient rather a call back or a response via [employer]? Call back  Best Call Back Number:[phone]  Additional Information: Facility Requesting call back regarding possible appointment on tomorrow.

## 2022-09-28 DIAGNOSIS — I10 HTN (HYPERTENSION): ICD-10-CM

## 2022-10-03 ENCOUNTER — PATIENT MESSAGE (OUTPATIENT)
Dept: ADMINISTRATIVE | Facility: HOSPITAL | Age: 67
End: 2022-10-03
Payer: MEDICARE

## 2022-11-07 ENCOUNTER — TELEPHONE (OUTPATIENT)
Dept: FAMILY MEDICINE | Facility: CLINIC | Age: 67
End: 2022-11-07
Payer: MEDICARE

## 2022-11-28 ENCOUNTER — TELEPHONE (OUTPATIENT)
Dept: FAMILY MEDICINE | Facility: CLINIC | Age: 67
End: 2022-11-28
Payer: MEDICARE

## 2023-01-27 ENCOUNTER — PATIENT OUTREACH (OUTPATIENT)
Dept: ADMINISTRATIVE | Facility: HOSPITAL | Age: 68
End: 2023-01-27
Payer: MEDICARE

## 2023-01-27 ENCOUNTER — PATIENT MESSAGE (OUTPATIENT)
Dept: ADMINISTRATIVE | Facility: HOSPITAL | Age: 68
End: 2023-01-27
Payer: MEDICARE

## 2023-03-30 ENCOUNTER — PATIENT OUTREACH (OUTPATIENT)
Dept: ADMINISTRATIVE | Facility: HOSPITAL | Age: 68
End: 2023-03-30
Payer: MEDICARE

## 2023-03-30 NOTE — PROGRESS NOTES
Non-compliant GAP report chart review - Chart review completed for the following HM test if overdue  (Mammogram, Colonoscopy, Cervical Cancer Screening,  Diabetic lab testing, and/or Dilated EYE EXAM)     Care Everywhere and Media reports - updates requested and reviewed.      Health Maintenance Due   Topic Date Due    TETANUS VACCINE  Never done    Shingles Vaccine (1 of 2) Never done    Colorectal Cancer Screening  11/04/2009    Pneumococcal Vaccines (Age 65+) (1 - PCV) Never done    Mammogram  02/25/2022    COVID-19 Vaccine (5 - Booster for Pfizer series) 10/06/2022

## 2023-04-11 ENCOUNTER — PATIENT MESSAGE (OUTPATIENT)
Dept: ADMINISTRATIVE | Facility: HOSPITAL | Age: 68
End: 2023-04-11
Payer: MEDICARE

## 2023-05-04 ENCOUNTER — NUTRITION (OUTPATIENT)
Dept: NUTRITION | Facility: CLINIC | Age: 68
End: 2023-05-04
Payer: MEDICARE

## 2023-05-04 VITALS — HEIGHT: 63 IN | BODY MASS INDEX: 33.68 KG/M2 | WEIGHT: 190.06 LBS

## 2023-05-04 DIAGNOSIS — E78.5 DYSLIPIDEMIA: Primary | ICD-10-CM

## 2023-05-04 DIAGNOSIS — E66.9 OBESITY (BMI 30-39.9): ICD-10-CM

## 2023-05-04 DIAGNOSIS — Z71.3 DIETARY COUNSELING: ICD-10-CM

## 2023-05-04 PROCEDURE — 97802 MEDICAL NUTRITION INDIV IN: CPT | Mod: S$GLB,,, | Performed by: DIETITIAN, REGISTERED

## 2023-05-04 PROCEDURE — 97802 PR MED NUTR THER, 1ST, INDIV, EA 15 MIN: ICD-10-PCS | Mod: S$GLB,,, | Performed by: DIETITIAN, REGISTERED

## 2023-05-04 PROCEDURE — 99999 PR PBB SHADOW E&M-EST. PATIENT-LVL III: ICD-10-PCS | Mod: PBBFAC,,,

## 2023-05-04 PROCEDURE — 99999 PR PBB SHADOW E&M-EST. PATIENT-LVL III: CPT | Mod: PBBFAC,,,

## 2023-05-04 NOTE — PROGRESS NOTES
"Referring Physician:Artie Anand MD      [place] Head #[id number] participated in visit for first few minutes; [facility] arrived and completed visit in Bulgarian in person    Reason for visit:  Chief Complaint   Patient presents with    Hyperlipidemia    Nutrition Counseling    Obesity      Initial Visit    :1955     Allergies Reviewed  Meds Reviewed    Anthropometrics  Weight:86.2 kg (190 lb 0.6 oz)  Height:5' 3" (1.6 m)  BMI:Body mass index is 33.66 kg/m².   IBW: 52.4 kg  +/-10%    Meds:  Outpatient Medications Prior to Visit   Medication Sig Dispense Refill    Bifidobacterium infantis (ALIGN) 4 mg Cap Take 1 capsule (4 mg total) by mouth once daily. 30 capsule 3    calcium carbonate-vit D3-min 600 mg calcium- 400 unit Tab Take 1 tablet by mouth 2 (two) times a day. 60 tablet 11    clotrimazole (LOTRIMIN) 1 % cream       furosemide (LASIX) 20 MG tablet Take 1 tablet (20 mg total) by mouth once daily. 30 tablet 11    hydroCHLOROthiazide (HYDRODIURIL) 25 MG tablet Take 2 tablets (50 mg total) by mouth once daily. 60 tablet 6    methIMAzole (TAPAZOLE) 5 MG Tab TAKE ONE AND ONE-HALF (1 & 1/2) TABLETS BY MOUTH EVERY  tablet 3    MULTIVIT-IRON-MIN-FOLIC ACID 3,500-18-0.4 UNIT-MG-MG ORAL CHEW Take by mouth.      naproxen (NAPROSYN) 500 MG tablet Take 1 tablet (500 mg total) by mouth 2 (two) times daily with meals. 30 tablet 1    pantoprazole (PROTONIX) 40 MG tablet Take 1 tablet (40 mg total) by mouth once daily. 30 tablet 11     No facility-administered medications prior to visit.       Food/Drug Interactions Noted:  n/a    Vitamins/Supplements/Herbs:  MVI    Labs: n/a     Nutrition Prescription: 1572 Kcals/day( 30 kcal/kg IBW),  52 g protein( 1.0 g/kg IBW)     Support System:  pt lives alone; does her own grocery shopping and cooking    Diet Hx:  pt would like guidance to lose weight.  Voiced concerns with her thyroid, cholesterol.  Snacks:  cheese & crackers; mandarins; " "cookies (Oreos).  Beverages:  water, fruit juice, Coke.  Fruit shake:  berries, banana, either whole milk/almond milk/coconut milk.     Breakfast:  egg with toast bread.  Or cereal.  Or fruit shake.  Or sweet bread with coffee  Lunch/Dinner:  usually only eats two meals/day.  Fish/chicken/beef with rice and salad-ranch dressing and vegetable.  Water/fruit juice/small Coke.  Or beans/cheese.  Or salad with boiled egg.    Current activity level and/or physical limitations:  has gym membership; states she goes 1-3 times/week.    Motivation to make changes/anticipated barriers and/or expected adherence:  no barriers to adherence identified    Nutrition-Focus Physical Findings:   pt appears well nourished    Assessment: pt attentive and asked relevant questions about foods/beverages recommended and to avoid; reading food labels; sample meal plan and menus; portion control; grocery shopping and cooking tips; healthy snacks; role of exercise in weight management.  All of her questions were answered (vis ) and she verbalized understanding of information.    Nutrition Diagnosis: obesity RT excess energy intake and physical inactivity    Recommendations:  1600 calorie, low fat, high fiber diet. Avoid sugary beverages. Exercise goal:  30 minutes per day, 3-5 days per week.  Handouts in Citizen of [place] provided & reviewed:  Mi planificador de plato; Terapia nutritional cardiaca-TLC; Consejos para leer etiquetas saludables para el adarsh; Ejemplos de menus de 1,6000 calorias; Recuento de carbohidratos; Porciones de carbohidratos; Porciones sin carbohidratos; Opciones de refrigerios; Opciones de meriendas; Actividad Fisica; Recursos para el control de la diabetes "Nuestros 5 Favoritos"    Strategies Implemented:  portion control; increase physical activity    Consultation Time:45 minutes.  Communicated with referring healthcare provider:  Consult note available in pt's Epic chart per MD discretion  Follow Up:  Pt provided " with dietitian contact number and advised to call with questions or make future appointment if further intervention needed.

## 2023-05-04 NOTE — PATIENT INSTRUCTIONS
1600 calorie, low fat, high fiber diet.  Avoid sugary beverages.  Exercise goal:  30 minutes per day, 3-5 days per week.

## 2023-05-26 DIAGNOSIS — Z12.31 OTHER SCREENING MAMMOGRAM: Primary | ICD-10-CM

## 2023-06-02 ENCOUNTER — HOSPITAL ENCOUNTER (OUTPATIENT)
Dept: RADIOLOGY | Facility: HOSPITAL | Age: 68
Discharge: HOME | End: 2023-06-02
Payer: MEDICARE

## 2023-06-02 DIAGNOSIS — Z12.31 OTHER SCREENING MAMMOGRAM: ICD-10-CM

## 2023-06-02 PROCEDURE — 77063 MAMMO DIGITAL SCREENING BILAT WITH TOMO: ICD-10-PCS | Mod: 26,,, | Performed by: RADIOLOGY

## 2023-06-02 PROCEDURE — 77067 SCR MAMMO BI INCL CAD: CPT | Mod: TC

## 2023-06-02 PROCEDURE — 77067 SCR MAMMO BI INCL CAD: CPT | Mod: 26,,, | Performed by: RADIOLOGY

## 2023-06-02 PROCEDURE — 77063 BREAST TOMOSYNTHESIS BI: CPT | Mod: 26,,, | Performed by: RADIOLOGY

## 2023-06-02 PROCEDURE — 77067 MAMMO DIGITAL SCREENING BILAT WITH TOMO: ICD-10-PCS | Mod: 26,,, | Performed by: RADIOLOGY

## 2023-06-20 ENCOUNTER — PATIENT OUTREACH (OUTPATIENT)
Dept: ADMINISTRATIVE | Facility: HOSPITAL | Age: 68
End: 2023-06-20
Payer: MEDICARE

## 2023-06-20 ENCOUNTER — PATIENT MESSAGE (OUTPATIENT)
Dept: ADMINISTRATIVE | Facility: HOSPITAL | Age: 68
End: 2023-06-20
Payer: MEDICARE

## 2023-06-21 ENCOUNTER — PATIENT MESSAGE (OUTPATIENT)
Dept: ADMINISTRATIVE | Facility: HOSPITAL | Age: 68
End: 2023-06-21
Payer: MEDICARE

## 2023-10-12 ENCOUNTER — PATIENT MESSAGE (OUTPATIENT)
Dept: RESPIRATORY THERAPY | Facility: HOSPITAL | Age: 68
End: 2023-10-12
Payer: MEDICARE

## 2024-04-25 DIAGNOSIS — E05.90 HYPERTHYROIDISM: Primary | ICD-10-CM

## 2024-04-30 ENCOUNTER — HOSPITAL ENCOUNTER (OUTPATIENT)
Dept: RADIOLOGY | Facility: HOSPITAL | Age: 69
Discharge: HOME | End: 2024-04-30
Payer: MEDICARE

## 2024-04-30 DIAGNOSIS — E05.90 HYPERTHYROIDISM: ICD-10-CM

## 2024-05-03 ENCOUNTER — HOSPITAL ENCOUNTER (OUTPATIENT)
Dept: RADIOLOGY | Facility: HOSPITAL | Age: 69
Discharge: HOME | End: 2024-05-03
Attending: INTERNAL MEDICINE
Payer: MEDICARE

## 2024-05-03 DIAGNOSIS — E05.90 HYPERTHYROIDISM: ICD-10-CM

## 2024-05-03 PROCEDURE — 76536 US EXAM OF HEAD AND NECK: CPT | Mod: TC

## 2024-05-03 PROCEDURE — 76536 US EXAM OF HEAD AND NECK: CPT | Mod: 26,,, | Performed by: RADIOLOGY

## 2024-05-13 ENCOUNTER — HOSPITAL ENCOUNTER (OUTPATIENT)
Dept: RADIOLOGY | Facility: HOSPITAL | Age: 69
Discharge: HOME | End: 2024-05-13
Payer: MEDICARE

## 2024-05-13 PROCEDURE — 78014 THYROID IMAGING W/BLOOD FLOW: CPT | Mod: TC

## 2024-05-13 PROCEDURE — A9516 IODINE I-123 SOD IODIDE MIC: HCPCS

## 2024-05-13 PROCEDURE — 78014 THYROID IMAGING W/BLOOD FLOW: CPT | Mod: 26,,, | Performed by: RADIOLOGY

## 2024-05-13 RX ORDER — SODIUM IODIDE I 123 200 UCI/1
300 CAPSULE, GELATIN COATED ORAL ONCE
Status: COMPLETED | OUTPATIENT
Start: 2024-05-13 | End: 2024-05-13

## 2024-05-13 RX ADMIN — SODIUM IODIDE I 123 300 MICROCI: 200 CAPSULE, GELATIN COATED ORAL at 09:05

## 2024-05-14 ENCOUNTER — HOSPITAL ENCOUNTER (OUTPATIENT)
Dept: RADIOLOGY | Facility: HOSPITAL | Age: 69
Discharge: HOME | End: 2024-05-14
Payer: MEDICARE

## 2024-07-05 ENCOUNTER — OFFICE VISIT (OUTPATIENT)
Dept: OPHTHALMOLOGY | Facility: CLINIC | Age: 69
End: 2024-07-05
Payer: MEDICARE

## 2024-07-05 ENCOUNTER — CLINICAL SUPPORT (OUTPATIENT)
Dept: OPHTHALMOLOGY | Facility: CLINIC | Age: 69
End: 2024-07-05
Payer: MEDICARE

## 2024-07-05 DIAGNOSIS — H52.7 REFRACTIVE ERROR: ICD-10-CM

## 2024-07-05 DIAGNOSIS — H40.012 OAG (OPEN ANGLE GLAUCOMA) SUSPECT, LOW RISK, LEFT: ICD-10-CM

## 2024-07-05 DIAGNOSIS — H25.13 AGE-RELATED NUCLEAR CATARACT OF BOTH EYES: ICD-10-CM

## 2024-07-05 DIAGNOSIS — E05.00 GRAVES' ORBITOPATHY: ICD-10-CM

## 2024-07-05 DIAGNOSIS — H04.123 DRY EYE SYNDROME OF BOTH EYES: ICD-10-CM

## 2024-07-05 DIAGNOSIS — H40.011 OAG (OPEN ANGLE GLAUCOMA) SUSPECT, LOW RISK, RIGHT: Primary | ICD-10-CM

## 2024-07-05 PROCEDURE — 99999 PR PBB SHADOW E&M-EST. PATIENT-LVL I: CPT | Mod: PBBFAC,,, | Performed by: STUDENT IN AN ORGANIZED HEALTH CARE EDUCATION/TRAINING PROGRAM

## 2024-07-05 NOTE — PROGRESS NOTES
VISUAL FIELD TEST 24-2 SS-OU-DONE/AB  OD-REL-FIX-COOP-GOOD/AB  OS-REL-GOOD-FIX-POOR-COOP-GOOD/AB    PT HAS NO KNOWN ALLERGIES TO LATEX OR ADHESIVES./AB      MRX: OD +2.00 +1.00 X 180            OS +2.00 +0.75 X 8

## 2024-07-05 NOTE — PROGRESS NOTES
Subjective:  HPI    Chief complaint: New patient glaucoma evaluation by PCP Dr. Kristine Roach.   Patient information from [facility] visit on 04/09/2024 scanned into media.   Patient has also seen Dr. Horowitz in clinic (2020). Pt states that she was   told earlier this year of possible glaucoma by doctor (pt does not   remember doctor's name). Pt states that she has normal annual eye exams.   Pt denies any current eye pain but states that she has 5/10 headaches   (last episode occurred 1 week ago).    Past medical history? (+)Thyroid, HTN  Past ocular history?  1. Glaucoma  2. Graves' orbitopathy  3. Insufficiency of tear film of both eyes       Glaucoma history:  Diagnosed with glaucoma when? (+)glaucoma suspect per in 2024 by   Hx eye surgery? None  Hx eye lasers? None  History of low blood pressure? None  History of migraines? None  History of blunt trauma to eye? None  History of steroid use? None  Family history of glaucoma? (+)brother  What is the highest your eye pressure has been? (+)pt told of high eye   pressure OS    Eye drops?  1. Olopatadine BID OU  2. Humylub BID OU    Last edited by Trinity Garcia on 7/5/2024  1:31 PM.        Exam:  See encounter report for full exam    Assessment:  1. OAG (open angle glaucoma) suspect, low risk, right  2. OAG (open angle glaucoma) suspect, low risk, left  - Synopsis: Referred for ?hx elevated IOP OS, unsure who and how high.  - Surg hx: none   - Laser hx: none  - Glaucoma FHx: brother  -  / 569  - Gonio:  ([place] 7/2024)  - Tmax: 21/21  - Target IOP: <22  - Med adverse effects: n/a    Baseline HVF 7/2015 -- VFI 98/97  Baseline RNFL 7/2024  Baseline photos pending    07/05/2024  IOP adequate off drops  HVF: reliable, non-specific, VFI 97 OD  poorly reliable, ess full, VFI 99 OS -- stable c/w prior  OCT RNFL: nasally shifted sup peak, full, avg 96 OD  possibly early blunted ST peak, avg 96 OS -- baseline    3. Age-related nuclear cataract of both eyes  -  Mild, monitor    4. Dry eye syndrome of both eyes  - Using ATs and allergy drops from [place] - ok to continue    5. Refractive error  - Rec optometry for MRX - desires outside [facility]    6. Graves' orbitopathy  - Monitor per PCP     Plan:  G/S by C/D. +Fhx. IOP adequate with thick CCT. HVF full, OCT full.  - Observe off drops    Today's visit is associated with current and anticipated ongoing medical care related to this patient's single serious/complex condition (glaucoma suspect). Follow up is to be continued indefinitely to monitor the condition.    Return 6 months -- OCT, VA, IOP, Dilate    Rachel Brannon MD  [facility], Glaucoma

## 2024-07-17 ENCOUNTER — OFFICE VISIT (OUTPATIENT)
Dept: OPTOMETRY | Facility: CLINIC | Age: 69
End: 2024-07-17
Payer: MEDICARE

## 2024-07-17 DIAGNOSIS — H53.8 BLURRED VISION, BILATERAL: Primary | ICD-10-CM

## 2024-07-17 DIAGNOSIS — H52.203 HYPEROPIA OF BOTH EYES WITH ASTIGMATISM AND PRESBYOPIA: ICD-10-CM

## 2024-07-17 DIAGNOSIS — H52.4 HYPEROPIA OF BOTH EYES WITH ASTIGMATISM AND PRESBYOPIA: ICD-10-CM

## 2024-07-17 DIAGNOSIS — H52.03 HYPEROPIA OF BOTH EYES WITH ASTIGMATISM AND PRESBYOPIA: ICD-10-CM

## 2024-07-17 PROCEDURE — 1159F MED LIST DOCD IN RCRD: CPT | Mod: CPTII,S$GLB,, | Performed by: OPTOMETRIST

## 2024-07-17 PROCEDURE — 1126F AMNT PAIN NOTED NONE PRSNT: CPT | Mod: CPTII,S$GLB,, | Performed by: OPTOMETRIST

## 2024-07-17 PROCEDURE — 99212 OFFICE O/P EST SF 10 MIN: CPT | Mod: S$GLB,,, | Performed by: OPTOMETRIST

## 2024-07-17 PROCEDURE — 99999 PR PBB SHADOW E&M-EST. PATIENT-LVL II: CPT | Mod: PBBFAC,,, | Performed by: OPTOMETRIST

## 2024-07-17 PROCEDURE — 92015 DETERMINE REFRACTIVE STATE: CPT | Mod: S$GLB,,, | Performed by: OPTOMETRIST

## 2024-07-17 RX ORDER — LEVOCETIRIZINE DIHYDROCHLORIDE 5 MG/1
5 TABLET, FILM COATED ORAL DAILY PRN
COMMUNITY
Start: 2024-06-28

## 2024-07-18 NOTE — PROGRESS NOTES
HPI    Referred by Dr.Sara Salud MOLINA    Here for MRX per Salud. Vision seem blurry w/correction mainly near   vision. Update rx.    Eye drops:Olopatadina bid OU(from [place])                   Humylub bid OU                   Soothe prn OU    Last edited by Giselle Link MA on 7/17/2024  1:36 PM.            Assessment /Plan     For exam results, see Encounter Report.    Blurred vision, bilateral    Hyperopia of both eyes with astigmatism and presbyopia        MONITOR. ED PT ON ALL EXAM FINDINGS  RX FINAL SPECS   CONTINUE WITH DR. RENE FOR OCULAR CARE AND MGMT  RTC 1 YR//PRN FOR REE/DFE

## 2024-07-22 ENCOUNTER — TELEPHONE (OUTPATIENT)
Dept: PRIMARY CARE CLINIC | Facility: CLINIC | Age: 69
End: 2024-07-22
Payer: MEDICARE

## 2024-08-26 ENCOUNTER — TELEPHONE (OUTPATIENT)
Dept: VASCULAR SURGERY | Facility: CLINIC | Age: 69
End: 2024-08-26
Payer: MEDICARE

## 2024-08-26 NOTE — TELEPHONE ENCOUNTER
Attempted to contact the pt in reference to appt scheduled incorrectly on 9/11/24 but no answer.Left a voice message with a call back number [phone] informing her that the appt will be cancelled and to contact the clinic for further discussion.Attempted to contact the pt on daughter's phone but no answer.Left the same voice message as above.

## 2025-01-08 DIAGNOSIS — I87.2 VENOUS INSUFFICIENCY: Primary | ICD-10-CM

## 2025-01-28 ENCOUNTER — OFFICE VISIT (OUTPATIENT)
Dept: CARDIOLOGY | Facility: CLINIC | Age: 70
End: 2025-01-28
Payer: MEDICARE

## 2025-01-28 VITALS
HEIGHT: 63 IN | OXYGEN SATURATION: 100 % | HEART RATE: 58 BPM | BODY MASS INDEX: 33.81 KG/M2 | WEIGHT: 190.81 LBS | DIASTOLIC BLOOD PRESSURE: 87 MMHG | SYSTOLIC BLOOD PRESSURE: 124 MMHG

## 2025-01-28 DIAGNOSIS — I87.2 VENOUS INSUFFICIENCY: ICD-10-CM

## 2025-01-28 DIAGNOSIS — I10 PRIMARY HYPERTENSION: ICD-10-CM

## 2025-01-28 DIAGNOSIS — E05.90 PRIMARY HYPERTHYROIDISM: Primary | ICD-10-CM

## 2025-01-28 PROCEDURE — 3288F FALL RISK ASSESSMENT DOCD: CPT | Mod: CPTII,S$GLB,, | Performed by: STUDENT IN AN ORGANIZED HEALTH CARE EDUCATION/TRAINING PROGRAM

## 2025-01-28 PROCEDURE — 3008F BODY MASS INDEX DOCD: CPT | Mod: CPTII,S$GLB,, | Performed by: STUDENT IN AN ORGANIZED HEALTH CARE EDUCATION/TRAINING PROGRAM

## 2025-01-28 PROCEDURE — 3074F SYST BP LT 130 MM HG: CPT | Mod: CPTII,S$GLB,, | Performed by: STUDENT IN AN ORGANIZED HEALTH CARE EDUCATION/TRAINING PROGRAM

## 2025-01-28 PROCEDURE — 1125F AMNT PAIN NOTED PAIN PRSNT: CPT | Mod: CPTII,S$GLB,, | Performed by: STUDENT IN AN ORGANIZED HEALTH CARE EDUCATION/TRAINING PROGRAM

## 2025-01-28 PROCEDURE — 99999 PR PBB SHADOW E&M-EST. PATIENT-LVL III: CPT | Mod: PBBFAC,,, | Performed by: STUDENT IN AN ORGANIZED HEALTH CARE EDUCATION/TRAINING PROGRAM

## 2025-01-28 PROCEDURE — 3079F DIAST BP 80-89 MM HG: CPT | Mod: CPTII,S$GLB,, | Performed by: STUDENT IN AN ORGANIZED HEALTH CARE EDUCATION/TRAINING PROGRAM

## 2025-01-28 PROCEDURE — 99205 OFFICE O/P NEW HI 60 MIN: CPT | Mod: S$GLB,,, | Performed by: STUDENT IN AN ORGANIZED HEALTH CARE EDUCATION/TRAINING PROGRAM

## 2025-01-28 PROCEDURE — 1101F PT FALLS ASSESS-DOCD LE1/YR: CPT | Mod: CPTII,S$GLB,, | Performed by: STUDENT IN AN ORGANIZED HEALTH CARE EDUCATION/TRAINING PROGRAM

## 2025-01-28 NOTE — PROGRESS NOTES
Cardiology Clinic Visit    History of Present Illness:       Priscilla Lim is a pleasant 70 y.o. female with PMHx of graves disease, HTN, HLD here for lower extremities concern for venous insufficiency. However, she has significant varicose and spider veins with changes suggestive of lipedema. She is very concerned about her skin changes and is affecting her daily activities. L>R pain. No ulcer, cramping.       PCP gencare: Artie Anand     (9/2021)    US of vein insufficiency  10/29/2021  mpression:     1. There is no evidence of hemodynamically significant venous reflux in either right or left greater or lesser saphenous vein.  2. There is no evidence of bilateral lower extremity deep venous thrombosis.      History obtained by patient interview and supplemented by nursing documentation and chart review.   PMHx:  has a past medical history of Grave's disease, Hypertension, and Mixed hyperlipidemia (8/23/2013).   SurgHx:  has a past surgical history that includes Esophagogastroduodenoscopy; Colonoscopy; Esophagogastroduodenoscopy (N/A, 10/9/2020); and Laparoscopic cholecystectomy (N/A, 10/12/2020).   FamHx: family history includes Benign prostatic hyperplasia in her father; Diabetes in her brother, sister, sister, sister, and sister; Heart disease in her mother; Hypertension in her brother, brother, brother, brother, brother, brother, sister, sister, sister, sister, and sister; No Known Problems in her daughter, son, and son; Stroke in her mother.   SocialHx:  reports that she quit smoking about 14 years ago. She started smoking about 16 years ago. She has a 0.2 pack-year smoking history. She has quit using smokeless tobacco. She reports current alcohol use. She reports that she does not use drugs.  Home Meds:  Current Outpatient Medications   Medication Instructions    ALIGN 4 mg, Oral, Daily    calcium carbonate-vit D3-min 600 mg calcium- 400 unit Tab 1 tablet, Oral, 2 times daily    clotrimazole  "(LOTRIMIN) 1 % cream No dose, route, or frequency recorded.    furosemide (LASIX) 20 mg, Oral, Daily    hydroCHLOROthiazide (HYDRODIURIL) 50 mg, Oral, Daily    levocetirizine (XYZAL) 5 mg, Oral, Daily PRN    methIMAzole (TAPAZOLE) 5 MG Tab TAKE ONE AND ONE-HALF (1 & 1/2) TABLETS BY MOUTH EVERY DAY     MULTIVIT-IRON-MIN-FOLIC ACID 3,500-18-0.4 UNIT-MG-MG ORAL CHEW Oral    naproxen (NAPROSYN) 500 mg, Oral, 2 times daily with meals    pantoprazole (PROTONIX) 40 mg, Oral, Daily       Review of Systems: Comprehensive ROS was performed and is negative unless otherwise noted in HPI.   Objective   Objective/Exam:   LMP  (LMP Unknown)    Wt Readings from Last 4 Encounters:   05/04/23 86.2 kg (190 lb 0.6 oz)   01/18/22 86.1 kg (189 lb 14.8 oz)   01/18/22 86.4 kg (190 lb 7.6 oz)   01/05/22 85.5 kg (188 lb 7.9 oz)      Constitutional: NAD, Atraumatic, Conversant   HEENT: MMM, Sclera anicteric, No JVD   Cardiovascular: RRR, no murmurs noted, no chest tenderness to palpation, 2+ radial pulses b/l  Pulmonary: normal respiratory effort, CTAB, no crackles, wheezes  Abdominal: S/NT/ND  Musculoskeletal: No lower extremity edema noted b/l  Neurological: No gross neurological deficits  Skin: W/D/I  Psych: Appropriate affect, normal mood  Labs/Imaging/Procedures   Personally reviewed  Lab Results   Component Value Date     09/24/2021    K 3.5 09/24/2021     09/24/2021    CO2 30 (H) 09/24/2021    BUN 9 09/24/2021    CREATININE 0.7 09/24/2021    ANIONGAP 7 (L) 09/24/2021     Lab Results   Component Value Date    HGBA1C 5.6 09/24/2021     No results found for: "BNP", "BNPTRIAGEBLO"   Lab Results   Component Value Date    WBC 4.89 05/06/2021    HGB 13.7 05/06/2021    HCT 40.7 05/06/2021    HCT 43 10/09/2020     05/06/2021    GRAN 2.4 05/06/2021    GRAN 49.3 05/06/2021     Lab Results   Component Value Date    CHOL 219 (H) 09/24/2021    HDL 49 09/24/2021    LDLCALC 142.4 09/24/2021    LDLCALC 145.0 05/06/2021    LDLCALC " "142.8 02/10/2021    TRIG 138 09/24/2021     Lab Results   Component Value Date    TSH 1.327 09/24/2021     No results found for: "APOLIPOPROTE"  No results found for: "LIPOA"     TTE:  No results found for this or any previous visit.    Stress Testing:   No results found for this or any previous visit.     Coronary Angiogram:  No results found for this or any previous visit.      -Reviewing Medical records & lab results  -Independently reviewing and interpreting (if not documented by myself) EKGs, echocardiograms, catherizations   -Obtaining a history, performing a relevant exam, counseling/educating the patient/family  -Documenting clinical information in the EMR including ordering of tests  -Care coordination and communicating with other health care providers       Problem List:     1. Venous insufficiency      Assessment/Plan:   Priscilla Lim is a pleasant 70 y.o. female with PMHx of graves disease, HTN, HLD here for lower extremities concern for venous insufficiency. It appears a mix of varicose veins and lipedema.     Plan:  US of BLE- venous insufficiency study  Echo  Compression stockings  If the studies are negatives will like refer her to manual lymphatic drainage ir intermittent pneumatic compression .  Low impact exercise        Preventative Care:  Lipids:  PVD(V/A)      Patient expressed verbal understanding and agreed with the plan     Return sooner for concerns or questions. If symptoms persist go to the ED.  I have reviewed all pertinent data including patient's medical history in detail and updated the computerized patient record.       Total time spent counseling greater than fifty percent of total visit time.  Counseling included discussion regarding imaging findings, diagnosis, possibilities, treatment options, risks and benefits.      Thank you for the opportunity to care for this patient. Please don't hesitate to reach out with any questions/concerns         Bharat Mcwilliams MD  Cardiovascular " Disease; Interventional Cardiology  [facility]

## 2025-02-06 ENCOUNTER — HOSPITAL ENCOUNTER (OUTPATIENT)
Dept: CARDIOLOGY | Facility: HOSPITAL | Age: 70
Discharge: HOME | End: 2025-02-06
Attending: STUDENT IN AN ORGANIZED HEALTH CARE EDUCATION/TRAINING PROGRAM
Payer: MEDICARE

## 2025-02-06 ENCOUNTER — TELEPHONE (OUTPATIENT)
Dept: OPHTHALMOLOGY | Facility: CLINIC | Age: 70
End: 2025-02-06
Payer: MEDICARE

## 2025-02-06 VITALS — BODY MASS INDEX: 33.66 KG/M2 | HEIGHT: 63 IN | WEIGHT: 190 LBS

## 2025-02-06 DIAGNOSIS — I87.2 VENOUS INSUFFICIENCY: ICD-10-CM

## 2025-02-06 LAB
APICAL FOUR CHAMBER EJECTION FRACTION: 56 %
APICAL TWO CHAMBER EJECTION FRACTION: 56 %
ASCENDING AORTA: 4.2 CM
AV INDEX (PROSTH): 0.71
AV MEAN GRADIENT: 4 MMHG
AV PEAK GRADIENT: 9 MMHG
AV REGURGITATION PRESSURE HALF TIME: 447 MS
AV VALVE AREA BY VELOCITY RATIO: 2.3 CM²
AV VALVE AREA: 2.4 CM²
AV VELOCITY RATIO: 0.67
BSA FOR ECHO PROCEDURE: 1.96 M2
CV ECHO LV RWT: 0.52 CM
DOP CALC AO PEAK VEL: 1.5 M/S
DOP CALC AO VTI: 35.7 CM
DOP CALC LVOT AREA: 3.5 CM2
DOP CALC LVOT DIAMETER: 2.1 CM
DOP CALC LVOT PEAK VEL: 1 M/S
DOP CALC LVOT STROKE VOLUME: 87.2 CM3
DOP CALC MV VTI: 26.6 CM
DOP CALCLVOT PEAK VEL VTI: 25.2 CM
E WAVE DECELERATION TIME: 210 MSEC
E/A RATIO: 0.76
E/E' RATIO: 10 M/S
ECHO LV POSTERIOR WALL: 1.1 CM (ref 0.6–1.1)
FRACTIONAL SHORTENING: 28.6 % (ref 28–44)
INTERVENTRICULAR SEPTUM: 1.2 CM (ref 0.6–1.1)
IVC DIAMETER: 1.53 CM
LEFT ATRIUM AREA SYSTOLIC (APICAL 2 CHAMBER): 19.45 CM2
LEFT ATRIUM AREA SYSTOLIC (APICAL 4 CHAMBER): 19.61 CM2
LEFT ATRIUM VOLUME INDEX MOD: 29 ML/M2
LEFT ATRIUM VOLUME MOD: 54 ML
LEFT INTERNAL DIMENSION IN SYSTOLE: 3 CM (ref 2.1–4)
LEFT VENTRICLE DIASTOLIC VOLUME INDEX: 40.69 ML/M2
LEFT VENTRICLE DIASTOLIC VOLUME: 76.91 ML
LEFT VENTRICLE END DIASTOLIC VOLUME APICAL 2 CHAMBER: 69.32 ML
LEFT VENTRICLE END DIASTOLIC VOLUME APICAL 4 CHAMBER: 83.5 ML
LEFT VENTRICLE END SYSTOLIC VOLUME APICAL 2 CHAMBER: 51.38 ML
LEFT VENTRICLE END SYSTOLIC VOLUME APICAL 4 CHAMBER: 54.77 ML
LEFT VENTRICLE MASS INDEX: 88.6 G/M2
LEFT VENTRICLE SYSTOLIC VOLUME INDEX: 18.8 ML/M2
LEFT VENTRICLE SYSTOLIC VOLUME: 35.58 ML
LEFT VENTRICULAR INTERNAL DIMENSION IN DIASTOLE: 4.2 CM (ref 3.5–6)
LEFT VENTRICULAR MASS: 167.4 G
LV LATERAL E/E' RATIO: 8.9 M/S
LV SEPTAL E/E' RATIO: 10.3 M/S
LVED V (TEICH): 76.91 ML
LVES V (TEICH): 35.58 ML
LVOT MG: 2.19 MMHG
LVOT MV: 0.68 CM/S
MV MEAN GRADIENT: 1 MMHG
MV PEAK A VEL: 0.82 M/S
MV PEAK E VEL: 0.62 M/S
MV PEAK GRADIENT: 3 MMHG
MV VALVE AREA BY CONTINUITY EQUATION: 3.28 CM2
OHS CV RV/LV RATIO: 0.93 CM
OHS LV EJECTION FRACTION SIMPSONS BIPLANE MOD: 55 %
PISA AR MAX VEL: 5.15 M/S
PISA TR MAX VEL: 2.2 M/S
PV PEAK GRADIENT: 3 MMHG
PV PEAK VELOCITY: 0.83 M/S
RA PRESSURE ESTIMATED: 3 MMHG
RA VOL SYS: 33.94 ML
RIGHT ATRIAL AREA: 15 CM2
RIGHT ATRIUM VOLUME AREA LENGTH APICAL 4 CHAMBER: 32.94 ML
RIGHT VENTRICLE DIASTOLIC BASEL DIMENSION: 3.9 CM
RV TB RVSP: 5 MMHG
RV TISSUE DOPPLER FREE WALL SYSTOLIC VELOCITY 1 (APICAL 4 CHAMBER VIEW): 14.87 CM/S
SINUS: 3.9 CM
STJ: 3.6 CM
TDI LATERAL: 0.07 M/S
TDI SEPTAL: 0.06 M/S
TDI: 0.07 M/S
TR MAX PG: 19 MMHG
TRICUSPID ANNULAR PLANE SYSTOLIC EXCURSION: 2.55 CM
TV REST PULMONARY ARTERY PRESSURE: 22 MMHG
Z-SCORE OF LEFT VENTRICULAR DIMENSION IN END DIASTOLE: -2.31
Z-SCORE OF LEFT VENTRICULAR DIMENSION IN END SYSTOLE: -0.66

## 2025-02-06 PROCEDURE — 93970 EXTREMITY STUDY: CPT | Mod: 26,,, | Performed by: INTERNAL MEDICINE

## 2025-02-06 PROCEDURE — 93970 EXTREMITY STUDY: CPT | Mod: TC

## 2025-02-06 PROCEDURE — 93306 TTE W/DOPPLER COMPLETE: CPT | Mod: 26,,, | Performed by: INTERNAL MEDICINE

## 2025-02-06 PROCEDURE — 93306 TTE W/DOPPLER COMPLETE: CPT

## 2025-02-06 NOTE — TELEPHONE ENCOUNTER
----- Message from Niraj sent at 2/5/2025  4:32 PM CST -----  Contact: Patient  Type:  Patient Call          Who Called:patient daughter-July         Does the patient know what this is regarding?: Requesting a call back about having a appt scheduled for a check up ;pt has a referral from her PCP  ; please advise          Would the patient rather a call back or a response via [facility]?call           Best Call Back Number: [phone]          Additional Information:

## 2025-02-07 LAB
LEFT GREAT SAPHENOUS DISTAL THIGH DIA: 0.31 CM
LEFT GREAT SAPHENOUS JUNCTION DIA: 0.32 CM
LEFT GREAT SAPHENOUS MIDDLE THIGH DIA: 0.28 CM
LEFT GREAT SAPHENOUS PROXIMAL CALF DIA: 0.23 CM
LEFT SMALL SAPHENOUS KNEE DIA: 0.39 CM
RIGHT GREAT SAPHENOUS DISTAL THIGH DIA: 0.28 CM
RIGHT GREAT SAPHENOUS JUNCTION DIA: 0.51 CM
RIGHT GREAT SAPHENOUS MIDDLE THIGH DIA: 0.29 CM
RIGHT GREAT SAPHENOUS PROXIMAL CALF DIA: 0.29 CM
RIGHT GREAT SAPHENOUS PROXIMAL CALF REFLUX: 2461 MS
RIGHT SMALL SAPHENOUS KNEE DIA: 0.33 CM

## 2025-02-20 ENCOUNTER — TELEPHONE (OUTPATIENT)
Dept: OPHTHALMOLOGY | Facility: CLINIC | Age: 70
End: 2025-02-20
Payer: MEDICARE

## 2025-02-20 NOTE — TELEPHONE ENCOUNTER
----- Message from Nidhi sent at 2/20/2025  3:52 PM CST -----  Regarding: Scheduling Request  Contact: [phone]  Scheduling Request   Appt Type:  Ep  Date/Time Preference: Next available Monday or Tuesday  Caller Name: pt  Contact Preference:  [phone]: Schedule 6 month follow up, please call to advise thank you

## 2025-03-05 DIAGNOSIS — I10 HYPERTENSION, UNSPECIFIED TYPE: Primary | ICD-10-CM

## 2025-03-31 DIAGNOSIS — I10 HYPERTENSION, UNSPECIFIED TYPE: Primary | ICD-10-CM

## 2025-03-31 NOTE — PROGRESS NOTES
Cardiology Clinic Visit    History of Present Illness:       Priscilla Lim is a pleasant 70 y.o. female with PMHx of graves disease, HTN, HLD here for follow up. Voiced no CV complaints. Using compression stockings. No ulcer, cramping. Started statin every other day. Denies cp, sob, palpitations, presyncope/dizziness, syncope, orthopnea, PND, bendopnea, decrease ET, NVDC, fever, chills.      PCP gencare: Artie Anand MD     (9/2021)    US of BLE-vein 2/6/25    There is no evidence of a right lower extremity DVT.    There is no evidence of a left lower extremity DVT.    The right greater saphenous vein has reflux.    Right varicosities with significant reflux      US of vein insufficiency  10/29/2021  mpression:     1. There is no evidence of hemodynamically significant venous reflux in either right or left greater or lesser saphenous vein.  2. There is no evidence of bilateral lower extremity deep venous thrombosis.      History obtained by patient interview and supplemented by nursing documentation and chart review.   PMHx:  has a past medical history of Grave's disease, Hypertension, and Mixed hyperlipidemia (8/23/2013).   SurgHx:  has a past surgical history that includes Esophagogastroduodenoscopy; Colonoscopy; Esophagogastroduodenoscopy (N/A, 10/9/2020); and Laparoscopic cholecystectomy (N/A, 10/12/2020).   FamHx: family history includes Benign prostatic hyperplasia in her father; Diabetes in her brother, sister, sister, sister, and sister; Heart disease in her mother; Hypertension in her brother, brother, brother, brother, brother, brother, sister, sister, sister, sister, and sister; No Known Problems in her daughter, son, and son; Stroke in her mother.   SocialHx:  reports that she quit smoking about 14 years ago. She started smoking about 16 years ago. She has a 0.2 pack-year smoking history. She has quit using smokeless tobacco. She reports current alcohol use. She reports that she does not  "use drugs.  Home Meds:  Current Outpatient Medications   Medication Instructions    ALIGN (B.INFANTIS) 4 mg, Oral, Daily    calcium carbonate-vit D3-min 600 mg calcium- 400 unit Tab 1 tablet, Oral, 2 times daily    clotrimazole (LOTRIMIN) 1 % cream No dose, route, or frequency recorded.    furosemide (LASIX) 20 mg, Oral, Daily    hydroCHLOROthiazide (HYDRODIURIL) 50 mg, Oral, Daily    levocetirizine (XYZAL) 5 mg, Oral, Daily PRN    methIMAzole (TAPAZOLE) 5 MG Tab TAKE ONE AND ONE-HALF (1 & 1/2) TABLETS BY MOUTH EVERY DAY     MULTIVIT-IRON-MIN-FOLIC ACID 3,500-18-0.4 UNIT-MG-MG ORAL CHEW Take by mouth.    naproxen (NAPROSYN) 500 mg, Oral, 2 times daily with meals    pantoprazole (PROTONIX) 40 mg, Oral, Daily       Review of Systems: Comprehensive ROS was performed and is negative unless otherwise noted in HPI.   Objective   Objective/Exam:   /80 (BP Location: Left arm)   Pulse 60   Ht 5' 3" (1.6 m)   Wt 85.7 kg (189 lb)   LMP  (LMP Unknown)   BMI 33.48 kg/m²    Wt Readings from Last 4 Encounters:   04/01/25 85.7 kg (189 lb)   02/06/25 86.2 kg (190 lb)   01/28/25 86.5 kg (190 lb 12.9 oz)   05/04/23 86.2 kg (190 lb 0.6 oz)      Constitutional: NAD, Atraumatic, Conversant   HEENT: MMM, Sclera anicteric, No JVD   Cardiovascular: RRR, no murmurs noted, no chest tenderness to palpation, 2+ radial pulses b/l  Pulmonary: normal respiratory effort, CTAB, no crackles, wheezes  Abdominal: S/NT/ND  Musculoskeletal: No lower extremity edema noted b/l  Neurological: No gross neurological deficits  Skin: W/D/I  Psych: Appropriate affect, normal mood  Labs/Imaging/Procedures   Personally reviewed  Lab Results   Component Value Date     09/24/2021    K 3.5 09/24/2021     09/24/2021    CO2 30 (H) 09/24/2021    BUN 9 09/24/2021    CREATININE 0.7 09/24/2021    ANIONGAP 7 (L) 09/24/2021     Lab Results   Component Value Date    HGBA1C 5.6 09/24/2021     No results found for: "BNP", "BNPTRIAGEBLO"   Lab Results " "  Component Value Date    WBC 4.89 05/06/2021    HGB 13.7 05/06/2021    HCT 40.7 05/06/2021    HCT 43 10/09/2020     05/06/2021    GRAN 2.4 05/06/2021    GRAN 49.3 05/06/2021     Lab Results   Component Value Date    CHOL 219 (H) 09/24/2021    HDL 49 09/24/2021    LDLCALC 142.4 09/24/2021    LDLCALC 145.0 05/06/2021    LDLCALC 142.8 02/10/2021    TRIG 138 09/24/2021     Lab Results   Component Value Date    TSH 1.327 09/24/2021     No results found for: "APOLIPOPROTE"  No results found for: "LIPOA"     TTE:  No results found for this or any previous visit.    Stress Testing:   No results found for this or any previous visit.     Coronary Angiogram:  No results found for this or any previous visit.      -Reviewing Medical records & lab results  -Independently reviewing and interpreting (if not documented by myself) EKGs, echocardiograms, catherizations   -Obtaining a history, performing a relevant exam, counseling/educating the patient/family  -Documenting clinical information in the EMR including ordering of tests  -Care coordination and communicating with other health care providers       Problem List:     1. Venous insufficiency    2. Primary hyperthyroidism    3. Primary hypertension    4. Mixed hyperlipidemia    5. Hypertension, unspecified type        Assessment/Plan:   Priscilla Lim is a pleasant 70 y.o. female with PMHx of graves disease, HTN, HLD here for lower extremities concern for venous insufficiency. It appears a mix of varicose veins and lipedema. Imaging studies with reflux but is not c/o venous congestion. When she is back in her country she does lymphatics massages with improvement. Will put a referral for OT/lymphedema. NO CV needed now. Continue BP meds. RTC1y        Preventative Care:  Lipids:  PVD(V/A)      Patient expressed verbal understanding and agreed with the plan     Return sooner for concerns or questions. If symptoms persist go to the ED.  I have reviewed all pertinent data " including patient's medical history in detail and updated the computerized patient record.       Total time spent counseling greater than fifty percent of total visit time.  Counseling included discussion regarding imaging findings, diagnosis, possibilities, treatment options, risks and benefits.      Thank you for the opportunity to care for this patient. Please don't hesitate to reach out with any questions/concerns         Bharat Mcwilliams MD  Cardiovascular Disease; Interventional Cardiology  [facility]

## 2025-04-01 ENCOUNTER — OFFICE VISIT (OUTPATIENT)
Dept: CARDIOLOGY | Facility: CLINIC | Age: 70
End: 2025-04-01
Payer: MEDICARE

## 2025-04-01 VITALS
HEART RATE: 60 BPM | BODY MASS INDEX: 33.49 KG/M2 | DIASTOLIC BLOOD PRESSURE: 80 MMHG | WEIGHT: 189 LBS | HEIGHT: 63 IN | SYSTOLIC BLOOD PRESSURE: 130 MMHG

## 2025-04-01 DIAGNOSIS — I87.2 VENOUS INSUFFICIENCY: Primary | ICD-10-CM

## 2025-04-01 DIAGNOSIS — I10 PRIMARY HYPERTENSION: ICD-10-CM

## 2025-04-01 DIAGNOSIS — I10 HYPERTENSION, UNSPECIFIED TYPE: ICD-10-CM

## 2025-04-01 DIAGNOSIS — E78.2 MIXED HYPERLIPIDEMIA: ICD-10-CM

## 2025-04-01 DIAGNOSIS — E05.90 PRIMARY HYPERTHYROIDISM: ICD-10-CM

## 2025-04-01 PROCEDURE — 3079F DIAST BP 80-89 MM HG: CPT | Mod: CPTII,S$GLB,, | Performed by: STUDENT IN AN ORGANIZED HEALTH CARE EDUCATION/TRAINING PROGRAM

## 2025-04-01 PROCEDURE — 99215 OFFICE O/P EST HI 40 MIN: CPT | Mod: S$GLB,,, | Performed by: STUDENT IN AN ORGANIZED HEALTH CARE EDUCATION/TRAINING PROGRAM

## 2025-04-01 PROCEDURE — 3288F FALL RISK ASSESSMENT DOCD: CPT | Mod: CPTII,S$GLB,, | Performed by: STUDENT IN AN ORGANIZED HEALTH CARE EDUCATION/TRAINING PROGRAM

## 2025-04-01 PROCEDURE — 93000 ELECTROCARDIOGRAM COMPLETE: CPT | Mod: S$GLB,,, | Performed by: INTERNAL MEDICINE

## 2025-04-01 PROCEDURE — 1126F AMNT PAIN NOTED NONE PRSNT: CPT | Mod: CPTII,S$GLB,, | Performed by: STUDENT IN AN ORGANIZED HEALTH CARE EDUCATION/TRAINING PROGRAM

## 2025-04-01 PROCEDURE — 99999 PR PBB SHADOW E&M-EST. PATIENT-LVL III: CPT | Mod: PBBFAC,,, | Performed by: STUDENT IN AN ORGANIZED HEALTH CARE EDUCATION/TRAINING PROGRAM

## 2025-04-01 PROCEDURE — 1101F PT FALLS ASSESS-DOCD LE1/YR: CPT | Mod: CPTII,S$GLB,, | Performed by: STUDENT IN AN ORGANIZED HEALTH CARE EDUCATION/TRAINING PROGRAM

## 2025-04-01 PROCEDURE — 3008F BODY MASS INDEX DOCD: CPT | Mod: CPTII,S$GLB,, | Performed by: STUDENT IN AN ORGANIZED HEALTH CARE EDUCATION/TRAINING PROGRAM

## 2025-04-01 PROCEDURE — 1159F MED LIST DOCD IN RCRD: CPT | Mod: CPTII,S$GLB,, | Performed by: STUDENT IN AN ORGANIZED HEALTH CARE EDUCATION/TRAINING PROGRAM

## 2025-04-01 PROCEDURE — 3075F SYST BP GE 130 - 139MM HG: CPT | Mod: CPTII,S$GLB,, | Performed by: STUDENT IN AN ORGANIZED HEALTH CARE EDUCATION/TRAINING PROGRAM

## 2025-04-02 LAB
OHS QRS DURATION: 78 MS
OHS QTC CALCULATION: 443 MS

## 2025-04-15 ENCOUNTER — OFFICE VISIT (OUTPATIENT)
Dept: OPHTHALMOLOGY | Facility: CLINIC | Age: 70
End: 2025-04-15
Payer: MEDICARE

## 2025-04-15 DIAGNOSIS — H52.7 REFRACTIVE ERROR: ICD-10-CM

## 2025-04-15 DIAGNOSIS — H40.011 OAG (OPEN ANGLE GLAUCOMA) SUSPECT, LOW RISK, RIGHT: Primary | ICD-10-CM

## 2025-04-15 DIAGNOSIS — H40.012 OAG (OPEN ANGLE GLAUCOMA) SUSPECT, LOW RISK, LEFT: ICD-10-CM

## 2025-04-15 DIAGNOSIS — H25.13 AGE-RELATED NUCLEAR CATARACT OF BOTH EYES: ICD-10-CM

## 2025-04-15 DIAGNOSIS — H04.123 DRY EYE SYNDROME OF BOTH EYES: ICD-10-CM

## 2025-04-15 PROCEDURE — 99999 PR PBB SHADOW E&M-EST. PATIENT-LVL II: CPT | Mod: PBBFAC,,, | Performed by: STUDENT IN AN ORGANIZED HEALTH CARE EDUCATION/TRAINING PROGRAM

## 2025-04-15 NOTE — PROGRESS NOTES
Subjective:  HPI    DLS: 7/5/24 w/ Dr. Brannon    70 y.o. female presents for IOP Check w/ OCT and DFE. Patient denies   changes to VA, eye pain, headaches, flashes, and floaters.    Meds:  Unsure what gtts she uses. Unsure of what color they are but states they   are prescribed.  Last edited by Simona Martinez on 4/15/2025  3:05 PM.        Exam:  See encounter report for full exam    Assessment:  1. OAG (open angle glaucoma) suspect, low risk, right  2. OAG (open angle glaucoma) suspect, low risk, left  - Synopsis: Referred for ?hx elevated IOP OS, unsure who and how high.  - Surg hx: none   - Laser hx: none  - Glaucoma FHx: brother  -  / 569  - Gonio:  ([facility] 7/2024)  - Tmax: 21/21  - Target IOP: <22  - Med adverse effects: n/a    Baseline HVF 7/2015 -- VFI 98/97  Baseline RNFL 7/2024  Baseline photos pending    Last:  HVF: reliable, non-specific, VFI 97 OD  poorly reliable, ess full, VFI 99 OS -- stable c/w prior  OCT RNFL: nasally shifted sup peak, full, avg 96 OD  possibly early blunted ST peak, avg 96 OS -- baseline    04/15/2025  IOP adequate off drops  OCT RNFL: nasally shifted sup peak, full, avg 94 OD  possibly early blunted ST peak, avg 96 OS -- stable    3. Age-related nuclear cataract of both eyes  - Mild, monitor    4. Dry eye syndrome of both eyes  - Using ATs and allergy drops from [place] - ok to continue    5. Refractive error  - Rec optometry for MRX - desires outside [facility]    6. Graves' orbitopathy  - Monitor per PCP     Plan:  G/S by C/D. +Fhx. IOP adequate with thick CCT. HVF full, OCT full.  Today: OCT stable. CPM.  - Observe off drops  - [facility] MRX updates  - ATs QID OU  - Allergy drops BID OU    Today's visit is associated with current and anticipated ongoing medical care related to this patient's single serious/complex condition (glaucoma suspect). Follow up is to be continued indefinitely to monitor the condition.    Return annual -- OCT, VA, IOP, Dilate    Rachel Tarun  MD [facility]  [facility], Glaucoma

## 2025-04-24 ENCOUNTER — CLINICAL SUPPORT (OUTPATIENT)
Dept: REHABILITATION | Facility: HOSPITAL | Age: 70
End: 2025-04-24
Payer: MEDICARE

## 2025-04-24 DIAGNOSIS — I87.2 VENOUS INSUFFICIENCY: ICD-10-CM

## 2025-04-24 DIAGNOSIS — R60.9 LIPEDEMA: Primary | ICD-10-CM

## 2025-04-24 DIAGNOSIS — I89.0 LYMPHEDEMA: ICD-10-CM

## 2025-04-24 PROCEDURE — 97535 SELF CARE MNGMENT TRAINING: CPT

## 2025-04-24 PROCEDURE — 97165 OT EVAL LOW COMPLEX 30 MIN: CPT

## 2025-04-24 NOTE — PROGRESS NOTES
Outpatient Rehab    Occupational Therapy Evaluation    Patient Name: Priscilla Lim  MRN: [id number]  YOB: 1955  Encounter Date: 4/24/2025    Therapy Diagnosis:   Encounter Diagnoses   Name Primary?    Venous insufficiency     Lipedema Yes    Lymphedema      Physician: Bharat Frias MD    Physician Orders: Eval and Treat  Medical Diagnosis: Venous insufficiency    Visit # / Visits Authorized: 1 / 1  Insurance Authorization Period: 4/1/2025 to 4/1/2026  Date of Evaluation: 4/24/2025  Plan of Care Certification: 4/24/2025 to 7/16/2025     Time In: 1530   Time Out: 1630  Total Time: 60   Total Billable Time:  60    Intake Outcome Measure for FOTO Survey    Therapist reviewed FOTO scores for Priscilla Lim on 4/24/2025.   FOTO report - see Media section or FOTO account episode details.          Subjective   History of Present Illness  Priscilla is a 70 y.o. female who reports to occupational therapy with a chief concern of Lipedema of BLE.     The patient reports a medical diagnosis of Venous insufficiency (I87.2).            History of Present Condition/Illness: She was diagnosed with lipedema and CVI by her doctor in [place]. She denies bothersome swelling issues. She occasionally experiences swelling in ankles but it does not extend to the rest of her legs. Denies hx of cellulitis, DVT, injury, trauma, cancer, or other identifiable causes of lymphedema. Patient currently wears compression pantyhose or knee 20-30 mm hg garments. Alleviating factors include elevation and compression, worsening factors include dependency. Associated symptoms include limb heaviness and pain. Patient previously participated in lymphedema therapy at an outside clinic in [place]. Discharged in January 2025.     Additional Lymphedema History  The patient's medical history relevant to lymphedema includes Abdominal surgery.    Abdominoplasty     Activities of Daily Living  Previously independent with activities of daily  living? Yes     Currently independent with activities of daily living? Yes          Previously independent with instrumental activities of daily living? Yes     Currently independent with instrumental activities of daily living? Yes              Pain     Patient reports a current pain level of 7/10. Pain at best is reported as 3/10. Pain at worst is reported as 9/10.   Location: posterior calves, vaginal area  Pain-Relieving Factors: Elevation, Rest  Pain-Aggravating Factors: Standing         Living Arrangements  Living Situation  Housing: Home independently  Living Arrangements: Alone        Employment  Employment Status: Employed part-time   Caregiver,  3 days per week      Past Medical History/Physical Systems Review:   Priscilla Lim  has a past medical history of Grave's disease, Hypertension, and Mixed hyperlipidemia.    Priscilla Lim  has a past surgical history that includes Esophagogastroduodenoscopy; Colonoscopy; Esophagogastroduodenoscopy (N/A, 10/9/2020); and Laparoscopic cholecystectomy (N/A, 10/12/2020).    Priscilla has a current medication list which includes the following prescription(s): align (b.infantis), calcium carbonate-vit d3-min, clotrimazole, furosemide, hydrochlorothiazide, levocetirizine, methimazole, multivit-iron-min-folic acid, naproxen, and pantoprazole.    Review of patient's allergies indicates:  No Known Allergies     Objective   Lower Extremity Skin Observations  Right Lower Extremity Skin Observations  Right Lower Extremity Skin Appearance: Other (Comment)  Right Lower Extremity Skin Appearance Comment: lipedema tissue  Right Lower Extremity Edema Non-pitting or Pitting: None  Right Lower Extremity Vascular Changes: Varicosities  Right Lower Extremity Stemmer's Sign: No    Left Lower Extremity Skin Observations  Left Lower Extremity Skin Appearance: Other (Comment)  Left Lower Extremity Skin Appearance Comment: lipedema tissue  Left Lower Extremity Edema Non-pitting or Pitting:  None  Left Lower Extremity Vascular Changes: Varicosities  Left Lower Extremity Stemmer's Sign: No           Lower extremity Girth Measurements (in centimeters): taken in supine  Roots Left lower extremity  Date: 4/24/25 Right lower extremity   Date: 4/24/25   Metatarsal Head 23.0 cm 23.5 cm   Medial Malleolus 28.0 cm 26.0 cm   (+) 5cm 24.5 cm 25.0 cm   (+) 10cm 30.0 cm 31.5 cm   (+) 20cm 44.0 cm 46.0 cm   (+) 30cm 47.0 cm 43.0 cm       Picture of BLE, taken in supine via Ruby Ribbonku on therapist's cell phone with verbal consent from patient:         Treatment:  Self Care and ADLs  ADL 1: see education section.    Time Entry(in minutes):  OT Evaluation (Low) Time Entry: 45  Self Care/Home Management (ADLs) Time Entry: 15    Assessment & Plan   Assessment  Priscilla presents with a condition of Low complexity.                                Evaluation/Treatment Response: Patient responded to treatment well  Prognosis: Good  Assessment Details: Pt presents with lipedema of RLE and LLE, swelling absent upon exam though given subjective reports of ankle swelling suspect likely mild lipolymphedema. Anticipate reduction and maintenance of swelling as well as prevention of further lipedema related tissue growth with 20-30 mm hg knee highs, Bioflect leggings, and Bioflect jeniffer garments, exercise, and elevation at rest. Option for home pneumatic pump as well. Pt to RTC in 4 weeks after she returns from a trip for reassessment after purchasing and wearing Bioflect leggings and home management training.     Plan  From an occupational therapy perspective, the patient would benefit from: Skilled Rehab Services    Planned therapy interventions include: Therapeutic exercise, Therapeutic activities, Manual therapy, ADLs/IADLs, and Lymphatic compression wrapping.            Visit Frequency: 1 times In Total             Discussion participants: Agreed Upon Plan of Care             Patient's spiritual, cultural, and educational needs  considered and patient agreeable to plan of care and goals.     Education  Education was done with Patient. The patient's learning style includes Demonstration and Listening. The patient Demonstrates understanding and Verbalizes understanding.          Patient was educated in  Compression needs: 20-30 mm hg knee highs and Bioflect leggings Home management plan: Wear schedule: Jose Antonio first thing in the morning, remove at the end of the day as close to bedtime as possible. Do not sleep in garments. If using a home pneumatic pump, remove garments when using pump. If it is not yet bedtime, resume wearing garments until bed. Donning/doffing techniques Wash and management of products Cost/coverage of compression garments: Medicare now pays for compression. Private insurance coverage is on a case-by-case basis. In order to determine coverage an Rx with a diagnosis of lymphedema is sent to a participating DME for a benefits check.  Medicare Coverage: Daytime garments - 3 sets (one garment for each affected body part) every six months,  standard or custom fit, or a combination of both. Nighttime garments - 2 sets (one garment for each affected body part) every two years, standard or custom fit, or a combination of both. Bandaging supplies - no set limit in the rule. Accessories - no set limit, will be determined on a case-by-case basis depending on the needs of the patient. Coverage is under Medicare part B. After deductible is met, a 20% copayment is required for all items. Secondary insurance often covers copayment's.  Bioflect leggings or jeniffer for thigh swelling to support size and shape of LE's Commitment to attendance and securing compression needs are critical to lymphedema management Pneumatic pump benefits, set-up, use, referral process, coverage through insurance. Coverage is based on insurance plan as well as if deductible has been met. Pneumatic pump is an added tool for managing lymphedema at home and is meant to  be used in addition to wearing compression garments daily but does not substitute compression garments.  Monitor for signs/symptoms of infection and seek medical attention immediately if symptoms occur. Do not need to return to therapy for new compression garments. PCP/referring provider can re-order compression garment Rx. Rx must include i89.0 lymphedema dx code. DME requires a face-to-face provider appointment within 6 months with i89.0 lymphedema dx code and medical justification statement for compression garments.         Goals:   Active       Long Term Goals       Patient and/or caregiver to ana/doff compression garment independently and with daily compliance to assist in lymphedema management, skin elasticity, and tissue density        Start:  04/24/25    Expected End:  06/05/25            Patient and/or caregiver will be independent in use of pneumatic compression pump or self manual lymphatic drainage techniques to areas within reach to enhance lymphatic drainage and skin condition.         Start:  04/24/25    Expected End:  06/05/25            Patient to be independent and compliant with home exercise program to allow for increased function in affected limb.        Start:  04/24/25    Expected End:  06/05/25               Short Term Goals       Patient will demonstrate 100% knowledge of lymphedema precautions and signs of infection to allow for reduced lymphedema risk, infection risk, and/or exacerbation of condition.         Start:  04/24/25    Expected End:  05/15/25            Patient and/or caregiver will order/obtain appropriate compression garments to maintain lymphatic and venous support.        Start:  04/24/25    Expected End:  05/15/25            Patient will tolerate daily activities wearing compression garments to allow for lymphatic drainage support, skin elasticity, and reduction in shape and size of limb.         Start:  04/24/25    Expected End:  05/15/25                Krista Oliveros  OT

## 2025-05-28 ENCOUNTER — CLINICAL SUPPORT (OUTPATIENT)
Dept: REHABILITATION | Facility: HOSPITAL | Age: 70
End: 2025-05-28
Payer: MEDICARE

## 2025-05-28 DIAGNOSIS — R60.9 LIPEDEMA: Primary | ICD-10-CM

## 2025-05-28 DIAGNOSIS — I89.0 LYMPHEDEMA: Primary | ICD-10-CM

## 2025-05-28 DIAGNOSIS — I89.0 LYMPHEDEMA: ICD-10-CM

## 2025-05-28 PROCEDURE — 97016 VASOPNEUMATIC DEVICE THERAPY: CPT

## 2025-05-28 PROCEDURE — 97535 SELF CARE MNGMENT TRAINING: CPT

## 2025-05-28 NOTE — PROGRESS NOTES
Outpatient Rehab    Occupational Therapy Discharge    Patient Name: Priscilla Lim  MRN: [id number]  YOB: 1955  Encounter Date: 5/28/2025    Therapy Diagnosis:   Encounter Diagnoses   Name Primary?    Lipedema Yes    Lymphedema      Physician: Bharat Frias MD    Physician Orders: Eval and Treat  Medical Diagnosis: Venous insufficiency (chronic) (peripheral)    Visit # / Visits Authorized: 1 / 20  Insurance Authorization Period: 4/17/2025 to 12/31/2025  Date of Evaluation: 4/1/2025  Plan of Care Certification: 4/24/2025 to 7/17/2025      Time In: 1617   Time Out: 1727  Total Time (in minutes): 70   Total Billable Time (in minutes):             Subjective   She went out of town so she has not ordered the Bioflect leggings yet. She has been wearing the compression stockings 3-4 days per week. Her swelling and pain are controlled when wearing the stockings but progress when out of the stockings. The pump feels great on her legs and she would like to have one for home pending insurance coverage..  Pain reported as 10/10. LLE    Objective          Lower extremity Girth Measurements (in centimeters): taken in supine  Blairs Left lower extremity  Date: 4/24/25 Right lower extremity   Date: 4/24/25 LLE  5/28/25 RLE  5/28/25   Metatarsal Head 23.0 cm 23.5 cm 22.5 cm 23.0 cm   Medial Malleolus 28.0 cm 26.0 cm 27.0 cm 27.5 cm   (+) 5cm 24.5 cm 25.0 cm 25.0 cm 25.0 cm   (+) 10cm 30.0 cm 31.5 cm 29.5 cm 32.0 cm   (+) 20cm 44.0 cm 46.0 cm 45.0 cm 43.5 cm   (+) 30cm 47.0 cm 43.0 cm 47.5 cm 45.0 cm       Treatment:  Manual Therapy  MT 1: Deferred bandaging  Self Care and ADLs  ADL 1: See education section.  Other Activities  Activity 1: SEQUENTIAL COMPRESSION PUMP: full leg sleeve applied to RLE and LLE  Airos 6 with default setting with distal pressures starting at 45mmHg entire extremity simultaneous to education.    Time Entry(in minutes):  Vasopneumatic Devices Time Entry: 45  Self Care/Home Management  (ADLs) Time Entry: 25    Assessment & Plan   Assessment: Swelling is adequately contained with daily 20-30 mm hg knee highs, home pump, and HEP, therefore no further therapeutic needs are identified at this time. Patient is ready for discharge to home management. All questions and concerns addressed. Patient advised on compression garment needs as well as ordering process through insurance vs private pay. Patient is to seek new referral if lymphedema status worsens or changes in the future. Patient would benefit from a home pump: Conservative treatments have been trialed for 4 weeks including regular elevation, exercise as tolerated, and the use of compression stockings. Despite these conservative treatments, the patient continues to present with chronic lymphedema and hyperpigmentation. Long-term, daily use of an in-home compression pump therapy is needed to maintain proper management of this chronic condition. Patient's prognosis is good with daily use of the compression pump.  Evaluation/Treatment Tolerance: Patient tolerated treatment well    The patient's spiritual, cultural, and educational needs were considered, and the patient is agreeable to the plan of care and goals.     Education  Education was done with Patient. The patient's learning style includes Demonstration and Listening. The patient Demonstrates understanding and Verbalizes understanding.          Patient was educated in  Compression needs: 20-30 mm hg knee highs and Bioflect jeniffer Home management plan: Wear schedule: Jose Antonio first thing in the morning, remove at the end of the day as close to bedtime as possible. Do not sleep in garments. If using a home pneumatic pump, remove garments when using pump. If it is not yet bedtime, resume wearing garments until bed. Donning/doffing techniques Wash and management of products Cost/coverage of compression garments: Medicare now pays for compression. Private insurance coverage is on a case-by-case basis.  In order to determine coverage an Rx with a diagnosis of lymphedema is sent to a participating DME for a benefits check.  Medicare Coverage: Daytime garments - 3 sets (one garment for each affected body part) every six months,  standard or custom fit, or a combination of both. Nighttime garments - 2 sets (one garment for each affected body part) every two years, standard or custom fit, or a combination of both. Bandaging supplies - no set limit in the rule. Accessories - no set limit, will be determined on a case-by-case basis depending on the needs of the patient. Coverage is under Medicare part B. After deductible is met, a 20% copayment is required for all items. Secondary insurance often covers copayment's.  Bioflect leggings or jeniffer for thigh swelling to support size and shape of LE's Commitment to attendance and securing compression needs are critical to lymphedema management Pneumatic pump benefits, set-up, use, referral process, coverage through insurance. Coverage is based on insurance plan as well as if deductible has been met. Pneumatic pump is an added tool for managing lymphedema at home and is meant to be used in addition to wearing compression garments daily but does not substitute compression garments.  Seek new referral if edema status worsens or changes in the future despite compliance with home management techniques.  Monitor for signs/symptoms of infection and seek medical attention immediately if symptoms occur. Do not need to return to therapy for new compression garments. PCP/referring provider can re-order compression garment Rx. Rx must include i89.0 lymphedema dx code. DME requires a face-to-face provider appointment within 6 months with i89.0 lymphedema dx code and medical justification statement for compression garments.         Plan: Discharge to home management. See education section.    Goals:   Active       Short Term Goals       Patient will demonstrate 100% knowledge of lymphedema  precautions and signs of infection to allow for reduced lymphedema risk, infection risk, and/or exacerbation of condition.   (Met)       Start:  04/24/25    Expected End:  05/15/25    Resolved:  05/28/25         Patient and/or caregiver will order/obtain appropriate compression garments to maintain lymphatic and venous support.  (Progressing)       Start:  04/24/25    Expected End:  05/15/25            Patient will tolerate daily activities wearing compression garments to allow for lymphatic drainage support, skin elasticity, and reduction in shape and size of limb.   (Met)       Start:  04/24/25    Expected End:  05/15/25    Resolved:  05/28/25           Resolved       Long Term Goals       Patient and/or caregiver to ana/doff compression garment independently and with daily compliance to assist in lymphedema management, skin elasticity, and tissue density  (Met)       Start:  04/24/25    Expected End:  06/05/25    Resolved:  05/28/25         Patient and/or caregiver will be independent in use of pneumatic compression pump or self manual lymphatic drainage techniques to areas within reach to enhance lymphatic drainage and skin condition.   (Met)       Start:  04/24/25    Expected End:  06/05/25    Resolved:  05/28/25         Patient to be independent and compliant with home exercise program to allow for increased function in affected limb.  (Met)       Start:  04/24/25    Expected End:  06/05/25    Resolved:  05/28/25             Krista Oliveros OT

## 2025-06-05 ENCOUNTER — TELEPHONE (OUTPATIENT)
Dept: CARDIOLOGY | Facility: CLINIC | Age: 70
End: 2025-06-05
Payer: MEDICARE

## 2025-06-09 ENCOUNTER — OFFICE VISIT (OUTPATIENT)
Dept: ENDOCRINOLOGY | Facility: CLINIC | Age: 70
End: 2025-06-09
Payer: MEDICARE

## 2025-06-09 ENCOUNTER — LAB VISIT (OUTPATIENT)
Dept: LAB | Facility: HOSPITAL | Age: 70
End: 2025-06-09
Attending: STUDENT IN AN ORGANIZED HEALTH CARE EDUCATION/TRAINING PROGRAM
Payer: MEDICARE

## 2025-06-09 VITALS
OXYGEN SATURATION: 97 % | DIASTOLIC BLOOD PRESSURE: 74 MMHG | HEIGHT: 63 IN | SYSTOLIC BLOOD PRESSURE: 126 MMHG | HEART RATE: 72 BPM | WEIGHT: 192.69 LBS | BODY MASS INDEX: 34.14 KG/M2

## 2025-06-09 DIAGNOSIS — I87.2 VENOUS INSUFFICIENCY: ICD-10-CM

## 2025-06-09 DIAGNOSIS — E04.2 MULTIPLE THYROID NODULES: ICD-10-CM

## 2025-06-09 DIAGNOSIS — E05.00 GRAVES DISEASE: Primary | ICD-10-CM

## 2025-06-09 DIAGNOSIS — E05.00 GRAVES DISEASE: ICD-10-CM

## 2025-06-09 LAB
T3FREE SERPL-MCNC: 100 NG/DL (ref 60–180)
T4 FREE SERPL-MCNC: 1.03 NG/DL (ref 0.71–1.51)
TSH SERPL-ACNC: 0.97 UIU/ML (ref 0.4–4)

## 2025-06-09 PROCEDURE — 1159F MED LIST DOCD IN RCRD: CPT | Mod: CPTII,S$GLB,, | Performed by: STUDENT IN AN ORGANIZED HEALTH CARE EDUCATION/TRAINING PROGRAM

## 2025-06-09 PROCEDURE — 1160F RVW MEDS BY RX/DR IN RCRD: CPT | Mod: CPTII,S$GLB,, | Performed by: STUDENT IN AN ORGANIZED HEALTH CARE EDUCATION/TRAINING PROGRAM

## 2025-06-09 PROCEDURE — 3074F SYST BP LT 130 MM HG: CPT | Mod: CPTII,S$GLB,, | Performed by: STUDENT IN AN ORGANIZED HEALTH CARE EDUCATION/TRAINING PROGRAM

## 2025-06-09 PROCEDURE — 83520 IMMUNOASSAY QUANT NOS NONAB: CPT

## 2025-06-09 PROCEDURE — 99999 PR PBB SHADOW E&M-EST. PATIENT-LVL V: CPT | Mod: PBBFAC,,, | Performed by: STUDENT IN AN ORGANIZED HEALTH CARE EDUCATION/TRAINING PROGRAM

## 2025-06-09 PROCEDURE — 3078F DIAST BP <80 MM HG: CPT | Mod: CPTII,S$GLB,, | Performed by: STUDENT IN AN ORGANIZED HEALTH CARE EDUCATION/TRAINING PROGRAM

## 2025-06-09 PROCEDURE — 84439 ASSAY OF FREE THYROXINE: CPT

## 2025-06-09 PROCEDURE — 84443 ASSAY THYROID STIM HORMONE: CPT

## 2025-06-09 PROCEDURE — 3288F FALL RISK ASSESSMENT DOCD: CPT | Mod: CPTII,S$GLB,, | Performed by: STUDENT IN AN ORGANIZED HEALTH CARE EDUCATION/TRAINING PROGRAM

## 2025-06-09 PROCEDURE — 84480 ASSAY TRIIODOTHYRONINE (T3): CPT

## 2025-06-09 PROCEDURE — G2211 COMPLEX E/M VISIT ADD ON: HCPCS | Mod: S$GLB,,, | Performed by: STUDENT IN AN ORGANIZED HEALTH CARE EDUCATION/TRAINING PROGRAM

## 2025-06-09 PROCEDURE — 1126F AMNT PAIN NOTED NONE PRSNT: CPT | Mod: CPTII,S$GLB,, | Performed by: STUDENT IN AN ORGANIZED HEALTH CARE EDUCATION/TRAINING PROGRAM

## 2025-06-09 PROCEDURE — 36415 COLL VENOUS BLD VENIPUNCTURE: CPT

## 2025-06-09 PROCEDURE — 3008F BODY MASS INDEX DOCD: CPT | Mod: CPTII,S$GLB,, | Performed by: STUDENT IN AN ORGANIZED HEALTH CARE EDUCATION/TRAINING PROGRAM

## 2025-06-09 PROCEDURE — 1101F PT FALLS ASSESS-DOCD LE1/YR: CPT | Mod: CPTII,S$GLB,, | Performed by: STUDENT IN AN ORGANIZED HEALTH CARE EDUCATION/TRAINING PROGRAM

## 2025-06-09 PROCEDURE — 99204 OFFICE O/P NEW MOD 45 MIN: CPT | Mod: S$GLB,,, | Performed by: STUDENT IN AN ORGANIZED HEALTH CARE EDUCATION/TRAINING PROGRAM

## 2025-06-09 NOTE — PROGRESS NOTES
"Endocrinology New Visit   06/09/2025      Subjective:      Chief Complaint:  Consult and Hyperthyroidism      History of Present Illness  Priscilla Lim is a 70 y.o. female with HTN, HLD, venous insufficiency referred by Dr. Bharat Peraza for evaluation of graves disease.     used for this encounter -- Aleksandr ref number ID# [id number], we were disconnected mid-visit, Maury ID# [id number] for second half of visit     Seen by endocrine CRISTIN in 2018 for Graves Disease.  She was on MMI 5 mg daily at that time and dose was increased to 7.5 mg due to symptoms (although she was biochemically euthyroid at that time).  At some point since then dose increased to 10 mg -- she states PCP has been refilling MMI since she was lost to f/u with endocrine.      Regarding hyperthyroidism:  Found on labs beginning 2010  Etiology: Graves Disease   No results found for: "THYROTROPINR", "TSIMMGLBN", "ARTSI"    She had a thyroid nuc med scan in 2010:  IMPRESSION:   1.  THE FINDINGS ARE COMPATIBLE WITH GRAVES DISEASE.  2.  IF IODINE-131 TREATMENT IS CONSIDERED, THE DOSE WOULD BE 16 mCi   ORALLY OF IODINE-131.  3.  THE 24 HOURS IODINE UPTAKE IS INCREASED TO 70%.      Previously on varying doses of MMI.      Current medication:  Methimazole 10 mg daily     Last Rx filled in 12/2024 however she states still taking it -- has bottle with her today -- Rx on bottle is for 10 mg tabs and states 1 tab qd. However, 2 diff color tabs in the bottle - unclear if this is 2 diff 10 mg tabs or a mix of 5 and 10 mg doses.      Weight: stable wt  Energy level: low, fatigued overall  Appetite: normal to slightly lower  Cold/heat intolerance: occ "chills"/cold intolerance  Bowel movements: denies constipation,denies hyperdefecation  Tremor: none  Palpitations: none  Nervousness/mood changes: yes, increased anxiety   +hair loss  Sleeping well she reports    Exposure to iodine/contrast: no      Does have MARTINE -- thus I-131 was avoided as tx " "for Graves in the past.  MARTINE has improved significantly and no current sx, follows with an eye doctor.    Eye symptoms:  Double vision: no  Changes in eye appearance: no  Periorbital edema: no  Eye pain/pressure: no  Eye grittiness/dryness: no  Light sensitivity: no    Denied neck enlargement, hoarseness, dysphagia.    Lab Results   Component Value Date    TSH 1.327 09/24/2021       Thyroid nodule(s)  Found 2010    Thyroid US 5/2024  FINDINGS:  Right thyroid lobe measures 5 x 1.4 x 1.7 cm.  Left thyroid lobe measures 5.3 x 1.3 x 1.7 cm.  Isthmus measures 0.3 cm.     Bilateral thyroid nodules, similar to slightly decreased in size when compared to ultrasound dated 05/19/2021.  Largest nodule as follows:     *Left lobe: 0.7 x 0.4 x 0.6 cm (previously 0.7 x 0.4 x 0.8 cm) solid, hypoechoic, wider than tall nodule with ill-defined borders and an associated macrocalcification (TI-RADS 4).  There are no nodules that meet sonographic criteria for continued follow-up or FNA.  No new nodules.     Thyroid parenchymal vascularity is normal.     No cervical lymphadenopathy.     Impression:     1. Bilateral thyroid nodules which do not meet sonographic criteria for follow-up or FNA and appear similar to slightly decreased in size when compared to ultrasound dated 05/19/2021.  No new nodules.      FNA Hx:   - 6/21/2018: L dominant nodule - benign        Risk Factors for Thyroid Cancer:  Hx of External Beam Radiation: denies  Family Hx of Thyroid Cancer: denies     Denies rapid neck enlargement, difficulty swallowing, SOB, or hoarseness.           ROS:   As above    Objective:     /74 (BP Location: Left arm, Patient Position: Sitting)   Pulse 72   Ht 5' 3" (1.6 m)   Wt 87.4 kg (192 lb 10.9 oz)   LMP  (LMP Unknown)   SpO2 97%   BMI 34.13 kg/m²   BP Readings from Last 3 Encounters:   06/09/25 126/74   04/01/25 130/80   01/28/25 124/87     Wt Readings from Last 1 Encounters:   06/09/25 1401 87.4 kg (192 lb 10.9 oz) "     Body mass index is 34.13 kg/m².      Physical Exam  Vitals reviewed.   Constitutional:       General: not in acute distress.     Appearance: not ill-appearing.   HENT:      Head: Normocephalic.      Mouth/Throat:      Mouth: Mucous membranes are moist.   Neck:      Thyroid: No thyroid mass or thyroid tenderness. No cervical LAD. +slight thyromegaly  Eyes:      Conjunctiva/sclera: Conjunctivae normal.   Cardiovascular:      Rate and Rhythm: Normal rate.   Pulmonary:      Effort: Pulmonary effort is normal.   Neurological:      Mental Status: alert and oriented to person, place, and time.   Psychiatric:         Mood and Affect: Mood normal.         Behavior: Behavior normal.       Lab Review:   Lab Results   Component Value Date    HGBA1C 5.6 09/24/2021     Lab Results   Component Value Date    CHOL 219 (H) 09/24/2021    HDL 49 09/24/2021    LDLCALC 142.4 09/24/2021    TRIG 138 09/24/2021    CHOLHDL 22.4 09/24/2021     Lab Results   Component Value Date     09/24/2021    K 3.5 09/24/2021     09/24/2021    CO2 30 (H) 09/24/2021    GLU 94 09/24/2021    BUN 9 09/24/2021    CREATININE 0.7 09/24/2021    CALCIUM 9.3 09/24/2021    PROT 6.8 09/24/2021    ALBUMIN 3.8 09/24/2021    BILITOT 0.5 09/24/2021    ALKPHOS 58 09/24/2021    AST 15 09/24/2021    ALT 18 09/24/2021    ANIONGAP 7 (L) 09/24/2021    ESTGFRAFRICA >60 09/24/2021    EGFRNONAA >60 09/24/2021    TSH 1.327 09/24/2021     Vit D, 25-Hydroxy   Date Value Ref Range Status   05/06/2021 41 30 - 96 ng/mL Final     Comment:     Vitamin D deficiency.........<10 ng/mL                              Vitamin D insufficiency......10-29 ng/mL       Vitamin D sufficiency........> or equal to 30 ng/mL  Vitamin D toxicity............>100 ng/mL           All relevant labs and imaging reviewed.    Assessment and Plan     1. Graves disease  -     TSH; Future; Expected date: 06/09/2025  -     T3; Future; Expected date: 06/09/2025  -     T4, Free; Future; Expected date:  06/09/2025  -     Thyrotropin Receptor Antibody; Future; Expected date: 06/09/2025    2. Venous insufficiency  -     Ambulatory referral/consult to Endocrinology    3. Multiple thyroid nodules  -     Cancel: US Soft Tissue Head Neck; Future; Expected date: 06/09/2025  -     US Soft Tissue Head Neck; Future; Expected date: 06/09/2025        GRAVES' DISEASE (THYROTOXICOSIS WITH DIFFUSE GOITER):  - Graves' disease present, thyroid labs not checked in years.  - Current methimazole dose of 10 mg higher than previous years. She was lost to f/u in the endocrine clinic in 2018.  - Symptoms potentially related to thyroid dysfunction (fatigue, hair loss, chills). Sx mor c/w iatrogenic hypothyroid  rather than hyperthyroidism but need updated labs to be sure.  - Graves dx by uptake and scan (Abs not checked)  - Plan to reassess need for continued methimazole therapy and determine appropriate dosage based on new lab results.  - Explained that Graves' disease is a chronic condition but can go into remission, potentially not requiring medication during those periods.  - Discussed that thyroid removal surgery is an option for Graves' disease, but would require lifelong thyroid hormone replacement.  - Continue methimazole 10 mg daily for now, pending lab results.  - Ordered thyroid function labs today along with TRAb.    THYROID NODULES:  - Multiple thyroid nodules with hx benign FNA of L dominant nodule in 2018.  - No compressive sx.  - Update thyroid US now.  - Briefly discussed indications that would warrant repeat FNA vs continued monitoring.    THYROID EYE DISEASE:  - Previous thyroid eye disease improved significantly.  - continue to follow with eye dr outside of [facility].  - No overt MARTINE evident today.      Labs today - TSH, fT4, T3, TRAb  Thyroid US in ur apt  RTC 4mo      Sarah Sternlieb, MD [facility]    Visit today included increased complexity associated with the care of the problems addressed and managing  the longitudinal care of the patient due to the serious and/or complex managed problems    This note was generated with the assistance of ambient listening technology. Verbal consent was obtained by the patient and accompanying visitor(s) for the recording of patient appointment to facilitate this note. I attest to having reviewed and edited the generated note for accuracy, though some syntax or spelling errors may persist. Please contact the author of this note for any clarification.

## 2025-06-09 NOTE — PATIENT INSTRUCTIONS
You can continue methimazole 10 mg daily for now but we will check labs and I will let you know if we need to change the dose.     Patients with Graves can sometimes go into remission and not need medication after being on meds initially.    We will also repeat your thyroid ultrasound to monitor thyroid nodules for growth or changes.     Follow up with me in 4 months.

## 2025-06-11 LAB — TSH RECEP AB SER-ACNC: <1.1 IU/L (ref 0–1.75)

## 2025-06-13 ENCOUNTER — RESULTS FOLLOW-UP (OUTPATIENT)
Dept: ENDOCRINOLOGY | Facility: CLINIC | Age: 70
End: 2025-06-13

## 2025-06-13 DIAGNOSIS — E05.00 GRAVES DISEASE: Primary | ICD-10-CM

## 2025-06-13 DIAGNOSIS — E05.90 PRIMARY HYPERTHYROIDISM: ICD-10-CM

## 2025-06-13 RX ORDER — METHIMAZOLE 10 MG/1
10 TABLET ORAL DAILY
Qty: 90 TABLET | Refills: 1 | Status: SHIPPED | OUTPATIENT
Start: 2025-06-13

## 2025-06-17 ENCOUNTER — HOSPITAL ENCOUNTER (OUTPATIENT)
Dept: RADIOLOGY | Facility: HOSPITAL | Age: 70
Discharge: HOME | End: 2025-06-17
Attending: STUDENT IN AN ORGANIZED HEALTH CARE EDUCATION/TRAINING PROGRAM
Payer: MEDICARE

## 2025-06-17 DIAGNOSIS — E04.2 MULTIPLE THYROID NODULES: ICD-10-CM

## 2025-06-17 PROCEDURE — 76536 US EXAM OF HEAD AND NECK: CPT | Mod: 26,,, | Performed by: INTERNAL MEDICINE

## 2025-06-17 PROCEDURE — 76536 US EXAM OF HEAD AND NECK: CPT | Mod: TC

## 2025-06-25 ENCOUNTER — PATIENT MESSAGE (OUTPATIENT)
Dept: OPTOMETRY | Facility: CLINIC | Age: 70
End: 2025-06-25
Payer: MEDICARE

## 2025-06-30 DIAGNOSIS — N81.9: Primary | ICD-10-CM

## 2025-08-12 ENCOUNTER — OFFICE VISIT (OUTPATIENT)
Dept: OBSTETRICS AND GYNECOLOGY | Facility: CLINIC | Age: 70
End: 2025-08-12
Payer: MEDICARE

## 2025-08-12 VITALS
DIASTOLIC BLOOD PRESSURE: 84 MMHG | HEIGHT: 63 IN | SYSTOLIC BLOOD PRESSURE: 124 MMHG | WEIGHT: 196.19 LBS | BODY MASS INDEX: 34.76 KG/M2

## 2025-08-12 DIAGNOSIS — N81.9: ICD-10-CM

## 2025-08-12 DIAGNOSIS — Z00.00 ENCOUNTER FOR ANNUAL WELLNESS VISIT: ICD-10-CM

## 2025-08-12 DIAGNOSIS — Z12.31 ENCOUNTER FOR SCREENING MAMMOGRAM FOR MALIGNANT NEOPLASM OF BREAST: Primary | ICD-10-CM

## 2025-08-12 PROCEDURE — 99999 PR PBB SHADOW E&M-EST. PATIENT-LVL III: CPT | Mod: PBBFAC,,, | Performed by: OBSTETRICS & GYNECOLOGY

## 2025-08-12 PROCEDURE — 88175 CYTOPATH C/V AUTO FLUID REDO: CPT | Mod: TC | Performed by: OBSTETRICS & GYNECOLOGY

## 2025-08-12 RX ORDER — ATORVASTATIN CALCIUM 20 MG/1
20 TABLET, FILM COATED ORAL
COMMUNITY
Start: 2025-07-29

## 2025-08-15 LAB
INSULIN SERPL-ACNC: NORMAL U[IU]/ML
LAB AP BETHESDA CATEGORY: NORMAL
LAB AP CLINICAL FINDINGS: NORMAL
LAB AP CONTRACEPTIVES: NORMAL
LAB AP GYN ADDITIONAL FINDINGS: NORMAL
LAB AP OCHS PAP SPECIMEN ADEQUACY: NORMAL
LAB AP OHS PAP INTERPRETATION: NORMAL
LAB AP PAP DISCLAIMER COMMENTS: NORMAL
LAB AP PAP ESTROGEN REPLACEMENT THERAPY: NORMAL
LAB AP PAP PMP: NORMAL
LAB AP PAP PREVIOUS BX: NORMAL
LAB AP PAP PRIOR TREATMENT: NORMAL
LAB AP PERFORMING LOCATION(S): NORMAL

## (undated) DEVICE — TROCAR ENDOPATH XCEL 12X100MM

## (undated) DEVICE — NDL HYPO REG 25G X 1 1/2

## (undated) DEVICE — TRAY MINOR GEN SURG

## (undated) DEVICE — NDL 18GA X1 1/2 REG BEVEL

## (undated) DEVICE — BAG TISS RETRV MONARCH 10MM

## (undated) DEVICE — DRAPE STERI INSTRUMENT 1018

## (undated) DEVICE — KIT ANTIFOG

## (undated) DEVICE — TROCAR ENDOPATH XCEL 5X75MM

## (undated) DEVICE — WARMER DRAPE STERILE LF

## (undated) DEVICE — SOL NS 1000CC

## (undated) DEVICE — SCISSOR 5MMX35CM DIRECT DRIVE

## (undated) DEVICE — SUT MCRYL PLUS 4-0 PS2 27IN

## (undated) DEVICE — IRRIGATOR ENDOSCOPY DISP.

## (undated) DEVICE — CLIP HEMO-LOK ML

## (undated) DEVICE — ELECTRODE REM PLYHSV RETURN 9

## (undated) DEVICE — ADHESIVE DERMABOND ADVANCED

## (undated) DEVICE — TUBING HF INSUFFLATION W/ FLTR

## (undated) DEVICE — DRAPE ABDOMINAL TIBURON 14X11

## (undated) DEVICE — BLADE SURG CARBON STEEL SZ11

## (undated) DEVICE — SEE MEDLINE ITEM 152622

## (undated) DEVICE — TROCAR ENDOPATH XCEL 5MM 7.5CM